# Patient Record
Sex: FEMALE | Race: WHITE | Employment: OTHER | URBAN - NONMETROPOLITAN AREA
[De-identification: names, ages, dates, MRNs, and addresses within clinical notes are randomized per-mention and may not be internally consistent; named-entity substitution may affect disease eponyms.]

---

## 2017-02-21 PROBLEM — Z95.0 S/P CARDIAC PACEMAKER PROCEDURE: Status: ACTIVE | Noted: 2017-02-21

## 2017-11-17 PROBLEM — I11.0 CONGESTIVE HEART FAILURE DUE TO HIGH BLOOD PRESSURE (HCC): Status: ACTIVE | Noted: 2017-11-17

## 2017-12-30 PROBLEM — J18.9 PNEUMONIA DUE TO ORGANISM: Status: ACTIVE | Noted: 2017-12-30

## 2017-12-30 PROBLEM — A41.9 SEPSIS (HCC): Status: ACTIVE | Noted: 2017-12-30

## 2017-12-31 PROBLEM — J96.01 ACUTE RESPIRATORY FAILURE WITH HYPOXIA (HCC): Status: ACTIVE | Noted: 2017-12-31

## 2018-01-30 PROBLEM — I50.33 ACUTE ON CHRONIC DIASTOLIC CHF (CONGESTIVE HEART FAILURE) (HCC): Status: ACTIVE | Noted: 2017-11-17

## 2018-01-30 PROBLEM — R77.8 ELEVATED TROPONIN: Status: ACTIVE | Noted: 2018-01-30

## 2018-01-30 PROBLEM — A41.9 SEPSIS (HCC): Status: RESOLVED | Noted: 2017-12-30 | Resolved: 2018-01-30

## 2018-01-30 PROBLEM — J96.01 ACUTE RESPIRATORY FAILURE WITH HYPOXIA (HCC): Status: RESOLVED | Noted: 2017-12-31 | Resolved: 2018-01-30

## 2018-01-30 PROBLEM — J18.9 PNEUMONIA DUE TO ORGANISM: Status: RESOLVED | Noted: 2017-12-30 | Resolved: 2018-01-30

## 2018-04-11 PROBLEM — R77.8 ELEVATED TROPONIN: Status: RESOLVED | Noted: 2018-01-30 | Resolved: 2018-04-11

## 2019-05-22 ENCOUNTER — OFFICE VISIT (OUTPATIENT)
Dept: PODIATRY | Age: 84
End: 2019-05-22
Payer: MEDICARE

## 2019-05-22 VITALS
TEMPERATURE: 97.4 F | SYSTOLIC BLOOD PRESSURE: 139 MMHG | DIASTOLIC BLOOD PRESSURE: 81 MMHG | BODY MASS INDEX: 25.69 KG/M2 | HEIGHT: 63 IN | WEIGHT: 145 LBS

## 2019-05-22 DIAGNOSIS — B35.1 TINEA UNGUIUM: Primary | ICD-10-CM

## 2019-05-22 DIAGNOSIS — R26.2 DIFFICULTY WALKING: ICD-10-CM

## 2019-05-22 DIAGNOSIS — L84 CORN OR CALLUS: ICD-10-CM

## 2019-05-22 DIAGNOSIS — M79.674 PAIN IN TOE OF RIGHT FOOT: ICD-10-CM

## 2019-05-22 DIAGNOSIS — M79.675 PAIN IN LEFT TOE(S): ICD-10-CM

## 2019-05-22 DIAGNOSIS — L60.0 INGROWN NAIL: ICD-10-CM

## 2019-05-22 DIAGNOSIS — I73.9 PERIPHERAL VASCULAR DISEASE, UNSPECIFIED (HCC): ICD-10-CM

## 2019-05-22 PROCEDURE — 11055 PARING/CUTG B9 HYPRKER LES 1: CPT | Performed by: PODIATRIST

## 2019-05-22 PROCEDURE — 11721 DEBRIDE NAIL 6 OR MORE: CPT | Performed by: PODIATRIST

## 2019-05-22 PROCEDURE — 99203 OFFICE O/P NEW LOW 30 MIN: CPT | Performed by: PODIATRIST

## 2019-05-22 NOTE — PROGRESS NOTES
[facility] PODIATRY  [address] [place] [address]  Dept: [phone]  Dept Fax: [phone]    NEW PATIENT PROGRESS NOTE  Date of patient's visit: 5/22/2019  Patient's Name:  Leilani Sears YOB: 1924            Patient Care Team:  Stewart Cummings MD as PCP - General (Internal Medicine)  Judie Tomas MD as Consulting Physician (Cardiology)        Chief Complaint   Patient presents with    Toe Pain     nail care saw PCP Dr. Misa Lion on 3/28/19    Foot Pain    Numbness    Nail Problem    Ingrown Toenail    Foot Swelling         HPI:   Leilani Sears is a 80 y.o. female who presents to the office today complaining of patient is seen with her  regarding a painful nails callus right foot very painful. Currently denies F/C/N/V. Pt's primary care physician is Stewart Cummings MD      Allergies   Allergen Reactions    Penicillins Hives    Sulfa Antibiotics Hives       Past Medical History:   Diagnosis Date    Acute on chronic congestive heart failure ([facility]     Acute respiratory failure with hypoxia ([facility] [facility] 12/31/2017    Arthritis     Atrial fibrillation (HCC)     Back pain 4-5 years    CAD (coronary artery disease)     Difficulty walking 4-5 years    Hypertension     Hypothyroidism     Osteoarthritis     Pneumonia due to organism 12/30/2017    Spinal stenosis     Urinary tract infection, acute        Prior to Admission medications    Medication Sig Start Date End Date Taking?  Authorizing Provider   furosemide (LASIX) 20 MG tablet Take 2 tablets by mouth 2 times daily 2/1/18  Yes Isrrael Humphreys, DO   spironolactone (ALDACTONE) 25 MG tablet Take 0.5 tablets by mouth daily 1/9/18  Yes Isrrael Humphreys DO   cloNIDine (CATAPRES) 0.1 MG tablet Take 1 tablet by mouth 3 times daily 11/21/17  Yes Isrrael Humphreys, DO   isosorbide mononitrate (IMDUR) 30 MG extended release tablet Take 1 tablet by mouth daily 11/20/17  Yes Dwight Gutierrez and metatarsals   Hammertoes: fourth toe and third toe  Hallux valgus: yes  Hallux limitus: yes  Skin Exam: skin changes and abnormal color; Neurovascular  Dorsalis pedis: absent  Posterior tibial: absent          Ortho Exam    General: AAO x 3 in NAD. Dermatologic Exam:  Skin lesion/ulceration Absent . Skin callus. Musculoskeletal:   The plantar aspect of the right foot there is a 2 x 2 painful callus plantar aspect of the 2nd and 3rd metatarsal    Vascular: DP and PT pulses palpable 0, Bilateral.  CFT <4 seconds, Bilateral.  Hair growth absent to the level of the digits, Bilateral.  Edema present, Bilateral.  Varicosities present, Bilateral. Erythema present, Bilateral    Toenail Description  Sites of Onychomycosis Involvement (Check affected area)  [x] [x] [x] [x] [x] [x] [x] [x] [x] [x]  5 4 3 2 1 1 2 3 4 5                          Right                                        Left    Thickness  [x] [x] [x] [x] [x] [x] [x] [x] [x] [x]  5 4 3 2 1 1 2 3 4 5                         Right                                        Left    Dystrophic Changes   [x] [x] [x] [x] [x] [x] [x] [x] [x] [x]  5 4 3 2 1 1 2 3 4 5                         Right                                        Left    discolored   [x] [x] [x] [x] [x] [x] [x] [x] [x] [x]  5 4 3 2 1 1 2 3 4 5                          Right                                        Left    Incurvation/Ingrowin   [] [x] [x] [x] [x] [x] [x] [x] [x] []  5 4 3 2 1 1 2 3 4 5                         Right                                        Left    Inflammation/Pain   [x] [x] [x] [x] [x] [x] [x] [x] [x] [x]  5 4 3 2 1 1 2 3 4 5                         Right                                        Left    Radiographs:      Asessment: Patient is a 80 y.o. female with:    Diagnosis Orders   1. Tinea unguium     2. Ingrown nail     3. Peripheral vascular disease, unspecified ([address]     4. Pain in toe of right foot     5. Pain in left toe(s)     6.  Difficulty walking

## 2019-06-21 ENCOUNTER — HOSPITAL ENCOUNTER (EMERGENCY)
Age: 84
Discharge: HOME | End: 2019-06-21
Attending: EMERGENCY MEDICINE
Payer: MEDICARE

## 2019-06-21 ENCOUNTER — APPOINTMENT (OUTPATIENT)
Dept: GENERAL RADIOLOGY | Age: 84
End: 2019-06-21
Payer: MEDICARE

## 2019-06-21 ENCOUNTER — APPOINTMENT (OUTPATIENT)
Dept: CT IMAGING | Age: 84
End: 2019-06-21
Payer: MEDICARE

## 2019-06-21 VITALS
WEIGHT: 150 LBS | TEMPERATURE: 97.9 F | HEART RATE: 60 BPM | HEIGHT: 63 IN | RESPIRATION RATE: 18 BRPM | DIASTOLIC BLOOD PRESSURE: 69 MMHG | OXYGEN SATURATION: 95 % | SYSTOLIC BLOOD PRESSURE: 166 MMHG | BODY MASS INDEX: 26.58 KG/M2

## 2019-06-21 DIAGNOSIS — I15.9 SECONDARY HYPERTENSION: ICD-10-CM

## 2019-06-21 DIAGNOSIS — M25.412 EFFUSION OF JOINT OF LEFT SHOULDER: ICD-10-CM

## 2019-06-21 DIAGNOSIS — M25.512 LEFT SHOULDER PAIN, UNSPECIFIED CHRONICITY: Primary | ICD-10-CM

## 2019-06-21 DIAGNOSIS — I10 HYPERTENSION, UNSPECIFIED TYPE: ICD-10-CM

## 2019-06-21 DIAGNOSIS — M19.012 OSTEOARTHRITIS OF GLENOHUMERAL JOINT, LEFT: ICD-10-CM

## 2019-06-21 LAB
ALBUMIN SERPL-MCNC: 3.8 G/DL (ref 3.5–5.2)
ALP BLD-CCNC: 79 U/L (ref 35–104)
ALT SERPL-CCNC: 13 U/L (ref 0–32)
ANION GAP SERPL CALCULATED.3IONS-SCNC: 12 MMOL/L (ref 7–16)
AST SERPL-CCNC: 20 U/L (ref 0–31)
BASOPHILS ABSOLUTE: 0.06 E9/L (ref 0–0.2)
BASOPHILS RELATIVE PERCENT: 1.1 % (ref 0–2)
BILIRUB SERPL-MCNC: 0.6 MG/DL (ref 0–1.2)
BUN BLDV-MCNC: 18 MG/DL (ref 8–23)
CALCIUM SERPL-MCNC: 9.4 MG/DL (ref 8.6–10.2)
CHLORIDE BLD-SCNC: 100 MMOL/L (ref 98–107)
CO2: 25 MMOL/L (ref 22–29)
CREAT SERPL-MCNC: 0.8 MG/DL (ref 0.5–1)
EOSINOPHILS ABSOLUTE: 0.06 E9/L (ref 0.05–0.5)
EOSINOPHILS RELATIVE PERCENT: 1.1 % (ref 0–6)
GFR AFRICAN AMERICAN: >60
GFR NON-AFRICAN AMERICAN: >60 ML/MIN/1.73
GLUCOSE BLD-MCNC: 140 MG/DL (ref 74–99)
HCT VFR BLD CALC: 42.2 % (ref 34–48)
HEMOGLOBIN: 13.5 G/DL (ref 11.5–15.5)
IMMATURE GRANULOCYTES #: 0.01 E9/L
IMMATURE GRANULOCYTES %: 0.2 % (ref 0–5)
INR BLD: 1.1
LYMPHOCYTES ABSOLUTE: 1.17 E9/L (ref 1.5–4)
LYMPHOCYTES RELATIVE PERCENT: 22.3 % (ref 20–42)
MCH RBC QN AUTO: 30.1 PG (ref 26–35)
MCHC RBC AUTO-ENTMCNC: 32 % (ref 32–34.5)
MCV RBC AUTO: 94.2 FL (ref 80–99.9)
MONOCYTES ABSOLUTE: 0.65 E9/L (ref 0.1–0.95)
MONOCYTES RELATIVE PERCENT: 12.4 % (ref 2–12)
NEUTROPHILS ABSOLUTE: 3.29 E9/L (ref 1.8–7.3)
NEUTROPHILS RELATIVE PERCENT: 62.9 % (ref 43–80)
PDW BLD-RTO: 13.2 FL (ref 11.5–15)
PLATELET # BLD: 163 E9/L (ref 130–450)
PMV BLD AUTO: 11.9 FL (ref 7–12)
POTASSIUM REFLEX MAGNESIUM: 4.6 MMOL/L (ref 3.5–5)
PRO-BNP: 2647 PG/ML (ref 0–450)
PROTHROMBIN TIME: 12.4 SEC (ref 9.3–12.4)
RBC # BLD: 4.48 E12/L (ref 3.5–5.5)
SODIUM BLD-SCNC: 137 MMOL/L (ref 132–146)
TOTAL PROTEIN: 6.5 G/DL (ref 6.4–8.3)
WBC # BLD: 5.2 E9/L (ref 4.5–11.5)

## 2019-06-21 PROCEDURE — 99284 EMERGENCY DEPT VISIT MOD MDM: CPT

## 2019-06-21 PROCEDURE — 80053 COMPREHEN METABOLIC PANEL: CPT

## 2019-06-21 PROCEDURE — 83880 ASSAY OF NATRIURETIC PEPTIDE: CPT

## 2019-06-21 PROCEDURE — 87205 SMEAR GRAM STAIN: CPT

## 2019-06-21 PROCEDURE — 71046 X-RAY EXAM CHEST 2 VIEWS: CPT

## 2019-06-21 PROCEDURE — 87070 CULTURE OTHR SPECIMN AEROBIC: CPT

## 2019-06-21 PROCEDURE — 73030 X-RAY EXAM OF SHOULDER: CPT

## 2019-06-21 PROCEDURE — 96374 THER/PROPH/DIAG INJ IV PUSH: CPT

## 2019-06-21 PROCEDURE — 73060 X-RAY EXAM OF HUMERUS: CPT

## 2019-06-21 PROCEDURE — 85025 COMPLETE CBC W/AUTO DIFF WBC: CPT

## 2019-06-21 PROCEDURE — 89060 EXAM SYNOVIAL FLUID CRYSTALS: CPT

## 2019-06-21 PROCEDURE — 6370000000 HC RX 637 (ALT 250 FOR IP): Performed by: EMERGENCY MEDICINE

## 2019-06-21 PROCEDURE — 85610 PROTHROMBIN TIME: CPT

## 2019-06-21 PROCEDURE — 6360000002 HC RX W HCPCS: Performed by: EMERGENCY MEDICINE

## 2019-06-21 PROCEDURE — 2500000003 HC RX 250 WO HCPCS: Performed by: STUDENT IN AN ORGANIZED HEALTH CARE EDUCATION/TRAINING PROGRAM

## 2019-06-21 PROCEDURE — 93005 ELECTROCARDIOGRAM TRACING: CPT | Performed by: STUDENT IN AN ORGANIZED HEALTH CARE EDUCATION/TRAINING PROGRAM

## 2019-06-21 PROCEDURE — 73200 CT UPPER EXTREMITY W/O DYE: CPT

## 2019-06-21 PROCEDURE — 6370000000 HC RX 637 (ALT 250 FOR IP): Performed by: STUDENT IN AN ORGANIZED HEALTH CARE EDUCATION/TRAINING PROGRAM

## 2019-06-21 PROCEDURE — 20610 DRAIN/INJ JOINT/BURSA W/O US: CPT

## 2019-06-21 RX ORDER — ACETAMINOPHEN 325 MG/1
650 TABLET ORAL ONCE
Status: COMPLETED | OUTPATIENT
Start: 2019-06-21 | End: 2019-06-21

## 2019-06-21 RX ORDER — 0.9 % SODIUM CHLORIDE 0.9 %
500 INTRAVENOUS SOLUTION INTRAVENOUS ONCE
Status: DISCONTINUED | OUTPATIENT
Start: 2019-06-21 | End: 2019-06-21

## 2019-06-21 RX ORDER — CLONIDINE HYDROCHLORIDE 0.1 MG/1
TABLET ORAL
Status: DISCONTINUED
Start: 2019-06-21 | End: 2019-06-21 | Stop reason: HOSPADM

## 2019-06-21 RX ORDER — LIDOCAINE HYDROCHLORIDE AND EPINEPHRINE 10; 10 MG/ML; UG/ML
20 INJECTION, SOLUTION INFILTRATION; PERINEURAL ONCE
Status: COMPLETED | OUTPATIENT
Start: 2019-06-21 | End: 2019-06-21

## 2019-06-21 RX ORDER — CLONIDINE HYDROCHLORIDE 0.1 MG/1
0.1 TABLET ORAL ONCE
Status: COMPLETED | OUTPATIENT
Start: 2019-06-21 | End: 2019-06-21

## 2019-06-21 RX ORDER — MORPHINE SULFATE 2 MG/ML
2 INJECTION, SOLUTION INTRAMUSCULAR; INTRAVENOUS ONCE
Status: COMPLETED | OUTPATIENT
Start: 2019-06-21 | End: 2019-06-21

## 2019-06-21 RX ADMIN — CLONIDINE HYDROCHLORIDE 0.1 MG: 0.1 TABLET ORAL at 17:32

## 2019-06-21 RX ADMIN — MORPHINE SULFATE 2 MG: 2 INJECTION, SOLUTION INTRAMUSCULAR; INTRAVENOUS at 16:13

## 2019-06-21 RX ADMIN — ACETAMINOPHEN 650 MG: 325 TABLET ORAL at 12:14

## 2019-06-21 RX ADMIN — LIDOCAINE HYDROCHLORIDE,EPINEPHRINE BITARTRATE 20 ML: 10; .01 INJECTION, SOLUTION INFILTRATION; PERINEURAL at 17:34

## 2019-06-21 ASSESSMENT — PAIN DESCRIPTION - PAIN TYPE
TYPE: ACUTE PAIN
TYPE: ACUTE PAIN

## 2019-06-21 ASSESSMENT — PAIN DESCRIPTION - DESCRIPTORS: DESCRIPTORS: ACHING

## 2019-06-21 ASSESSMENT — ENCOUNTER SYMPTOMS
EYE PAIN: 0
VOMITING: 0
EYE REDNESS: 0
SINUS PAIN: 0
ABDOMINAL PAIN: 0
DIARRHEA: 0
COUGH: 0
CONSTIPATION: 0
NAUSEA: 0
SORE THROAT: 0
SHORTNESS OF BREATH: 0

## 2019-06-21 ASSESSMENT — PAIN - FUNCTIONAL ASSESSMENT: PAIN_FUNCTIONAL_ASSESSMENT: PREVENTS OR INTERFERES SOME ACTIVE ACTIVITIES AND ADLS

## 2019-06-21 ASSESSMENT — PAIN DESCRIPTION - FREQUENCY: FREQUENCY: CONTINUOUS

## 2019-06-21 ASSESSMENT — PAIN SCALES - GENERAL
PAINLEVEL_OUTOF10: 0
PAINLEVEL_OUTOF10: 7
PAINLEVEL_OUTOF10: 6
PAINLEVEL_OUTOF10: 5

## 2019-06-21 ASSESSMENT — PAIN DESCRIPTION - ORIENTATION
ORIENTATION: LEFT
ORIENTATION: LEFT

## 2019-06-21 ASSESSMENT — PAIN DESCRIPTION - LOCATION
LOCATION: ARM
LOCATION: SHOULDER

## 2019-06-21 ASSESSMENT — PAIN DESCRIPTION - ONSET: ONSET: SUDDEN

## 2019-06-21 NOTE — ED NOTES
Bed:  [place] [address]  Expected date:   Expected time:   Means of arrival:   Comments:  Allison Knowles RN  06/21/19 1125

## 2019-06-21 NOTE — ED PROVIDER NOTES
The patient is a 63-year-old female presenting with arm pain. Patient a history of osteoarthritis and rheumatoid arthritis. Patient states for the past month her pain has increased in her left shoulder. The pain starts in the time of her left shoulder and progresses down her bicep to her elbow. Patient states that they have tried CBD oil further pain with minor improvement. The pain is located the superior and lateral aspect of the shoulder. Pain is made worse when she moves her arm. Pain is made better with CBD oil application and rest.  Patient denied any previous injuries to the shoulder. Patient is present at bedside with her  who states that the patient over the past few days has been waking up at night in excruciating pain in her left shoulder. She admits to a history of gout. Patient has pain in the left shoulder and decreased range of motion. Neurovascularly intact no discoloration or swelling noted. Left shoulder is tender to palpation. He denies fever, chills, shortness of breath or nausea, vomiting, chest pain, numbness, tingling. Patient is on Coumadin for atrial fibrillation. Review of Systems   Constitutional: Negative for chills and fever. HENT: Negative for congestion, sinus pain and sore throat. Eyes: Negative for pain and redness. Respiratory: Negative for cough and shortness of breath. Cardiovascular: Negative for chest pain and palpitations. Gastrointestinal: Negative for abdominal pain, constipation, diarrhea, nausea and vomiting. Endocrine: Negative for polyuria. Genitourinary: Negative for difficulty urinating, dysuria, frequency and hematuria. Musculoskeletal: Positive for arthralgias (Left shoulder), joint swelling (Left shoulder) and myalgias (Left bicep). Negative for neck pain. Skin: Negative. Neurological: Negative for dizziness, weakness, light-headedness and headaches. Hematological: Negative.     Psychiatric/Behavioral: Negative for agitation and confusion. Physical Exam   Constitutional: She is oriented to person, place, and time. She appears well-developed and well-nourished. HENT:   Head: Normocephalic and atraumatic. Eyes: Pupils are equal, round, and reactive to light. Conjunctivae and EOM are normal.   Neck: Normal range of motion. No JVD present. Cardiovascular: Normal rate, regular rhythm, normal heart sounds and intact distal pulses. Pulmonary/Chest: Effort normal and breath sounds normal. No respiratory distress. She has no wheezes. Abdominal: Soft. Bowel sounds are normal. She exhibits no distension and no mass. There is no tenderness. There is no guarding. Musculoskeletal: She exhibits no edema. Neurological: She is oriented to person, place, and time. Skin: Skin is warm and dry. Psychiatric: She has a normal mood and affect. Nursing note and vitals reviewed. Procedures  Arthrocentesis Procedure Note    Indication: joint swelling    Consent: The patient was counseled regarding the procedure, it's indications, risks, potential complications and alternatives and any questions were answered. Consent was obtained. Procedure: The left shoulder was positioned appropriately and the landmarks were identified. Local anesthesia was obtained by infiltration using 1% Lidocaine without epinephrine. The area was then prepped and draped in the usual sterile fashion. A needle was then introduced into the joint space at which point 2 cc of yellow and clear. A joint injection was not performed. The aspirated fluid was sent to the lab for appropriate testing. A sterile dressing was then applied to the site. The patient tolerated the procedure well. Complications: None      MDM    ED Course as of Jun 21 2005 Fri Jun 21, 2019   1447 Very large fluid collection in the subacromial subdeltoid  bursa which is likely a secondary manifestation of a rotator cuff  tear. Joint effusion.  Severe osteoarthritis at the glenohumeral joint  and somewhat milder at the [facility] joint with an [facility] joint effusion as well. [WL]   1770 She reevaluated at bedside, she has some swelling of her left shoulder that is tender to palpation.    [WL]   1600 Chest x-ray shows mild CHF without edema    [WL]   1601 X-ray of the left shoulder demonstrates moderate to severe osteoarthritis of the glenohumeral joint.    [WL]   7522 Patient's blood pressure was 230/93 but is in acute pain. We will give her morphine and then reevaluate her blood pressure.    [WL]   1727 Patient BP elevated. Clonidine ordered (home med). [CS]      ED Course User Index  [CS] Angelito Ayala DO  [WL] Russ Vo DO   4:00 PM  I have signed this patient out to the oncoming physician, Dr. Stefany Mae. I have discussed the patient's initial exam, treatment and plan of care with the on coming physician. I have notified the patient / family of the change in treating physician and answered their questions to this point. Arthrocentesis Procedure Note    Indication: Joint pain and joint swelling    Consent: The patient was counseled regarding the procedure, it's indications, risks, potential complications and alternatives and any questions were answered. Consent was obtained. Procedure: The left shoulder was positioned appropriately and the landmarks were identified. Local anesthesia was obtained by infiltration using 1% Lidocaine without epinephrine. The area was then prepped and draped in the usual sterile fashion. A needle was then introduced into the joint space at which point 20 cc of straw colored fluid was aspirated. A joint injection was not performed. The aspirated fluid was sent to the lab for appropriate testing. A sterile dressing was then applied to the site. The patient tolerated the procedure well.     Complications: None      Patient presents to the ED for   Chief Complaint   Patient presents with    Arm Pain     left x3 weeks, pt states she injured arm over 5 years ago   . Patient continues to be non-toxic on re-evaluation. Findings were discussed with the patient and reasons to immediately return to the ED were articulated to them. They will follow-up with their PMD.    ED Course as of Jun 21 2005 Fri Jun 21, 2019   1447 Very large fluid collection in the subacromial subdeltoid  bursa which is likely a secondary manifestation of a rotator cuff  tear. Joint effusion. Severe osteoarthritis at the glenohumeral joint  and somewhat milder at the [facility] joint with an [facility] joint effusion as well. [WL]   7890 She reevaluated at bedside, she has some swelling of her left shoulder that is tender to palpation.    [WL]   1600 Chest x-ray shows mild CHF without edema    [WL]   1601 X-ray of the left shoulder demonstrates moderate to severe osteoarthritis of the glenohumeral joint.    [WL]   2594 Patient's blood pressure was 230/93 but is in acute pain. We will give her morphine and then reevaluate her blood pressure.    [WL]   1727 Patient BP elevated. Clonidine ordered (home med). [CS]      ED Course User Index  [CS] Marc Shone, DO  [WL] Kathy Brewster DO       --------------------------------------------- PAST HISTORY ---------------------------------------------  Past Medical History:  has a past medical history of Acute on chronic congestive heart failure ([address] Acute respiratory failure with hypoxia ([address] Arthritis, Atrial fibrillation ([address] Back pain, CAD (coronary artery disease), Difficulty walking, Hypertension, Hypothyroidism, Osteoarthritis, Pneumonia due to organism, Spinal stenosis, and Urinary tract infection, acute. Past Surgical History:  has a past surgical history that includes hernia repair (2008); Hysterectomy (1968); joint replacement (1992); joint replacement (2000); Cataract removal (2007); Femur Surgery (5/5/12); Echo Complete (5/7/2012); fracture surgery; Tonsillectomy;  Appendectomy; Colonoscopy; eye surgery; pacemaker placement (2009); and pacemaker placement (02/21/2017). Social History:  reports that she has never smoked. She has never used smokeless tobacco. She reports that she does not drink alcohol or use drugs. Family History: family history includes Cancer in her brother, daughter, and sister; Stroke in her father. The patients home medications have been reviewed.     Allergies: Penicillins and Sulfa antibiotics    -------------------------------------------------- RESULTS -------------------------------------------------  Labs:  Results for orders placed or performed during the hospital encounter of 06/21/19   CBC auto differential   Result Value Ref Range    WBC 5.2 4.5 - 11.5 E9/L    RBC 4.48 3.50 - 5.50 E12/L    Hemoglobin 13.5 11.5 - 15.5 g/dL    Hematocrit 42.2 34.0 - 48.0 %    MCV 94.2 80.0 - 99.9 fL    MCH 30.1 26.0 - 35.0 pg    MCHC 32.0 32.0 - 34.5 %    RDW 13.2 11.5 - 15.0 fL    Platelets 297 488 - 936 E9/L    MPV 11.9 7.0 - 12.0 fL    Neutrophils % 62.9 43.0 - 80.0 %    Immature Granulocytes % 0.2 0.0 - 5.0 %    Lymphocytes % 22.3 20.0 - 42.0 %    Monocytes % 12.4 (H) 2.0 - 12.0 %    Eosinophils % 1.1 0.0 - 6.0 %    Basophils % 1.1 0.0 - 2.0 %    Neutrophils # 3.29 1.80 - 7.30 E9/L    Immature Granulocytes # 0.01 E9/L    Lymphocytes # 1.17 (L) 1.50 - 4.00 E9/L    Monocytes # 0.65 0.10 - 0.95 E9/L    Eosinophils # 0.06 0.05 - 0.50 E9/L    Basophils # 0.06 0.00 - 0.20 E9/L   Comprehensive Metabolic Panel w/ Reflex to MG   Result Value Ref Range    Sodium 137 132 - 146 mmol/L    Potassium reflex Magnesium 4.6 3.5 - 5.0 mmol/L    Chloride 100 98 - 107 mmol/L    CO2 25 22 - 29 mmol/L    Anion Gap 12 7 - 16 mmol/L    Glucose 140 (H) 74 - 99 mg/dL    BUN 18 8 - 23 mg/dL    CREATININE 0.8 0.5 - 1.0 mg/dL    GFR Non-African American >60 >=60 mL/min/1.73    GFR African American >60     Calcium 9.4 8.6 - 10.2 mg/dL    Total Protein 6.5 6.4 - 8.3 g/dL    Alb 3.8 3.5 - 5.2 g/dL    Total Bilirubin 0.6 0.0 - 1.2 mg/dL    Alkaline Phosphatase 79 35 - 104 U/L    ALT 13 0 - 32 U/L    AST 20 0 - 31 U/L   Brain Natriuretic Peptide   Result Value Ref Range    Pro-BNP 2,647 (H) 0 - 450 pg/mL   Protime-INR   Result Value Ref Range    Protime 12.4 9.3 - 12.4 sec    INR 1.1    EKG 12 Lead   Result Value Ref Range    Ventricular Rate 61 BPM    Atrial Rate 357 BPM    QRS Duration 156 ms    Q-T Interval 476 ms    QTc Calculation (Bazett) 479 ms    R Axis -75 degrees    T Axis 93 degrees       Radiology:  CT SHOULDER LEFT WO CONTRAST   Final Result   Very large fluid collection in the subacromial subdeltoid   bursa which is likely a secondary manifestation of a rotator cuff   tear. Joint effusion. Severe osteoarthritis at the glenohumeral joint   and somewhat milder at the [facility] joint with an [facility] joint effusion as well. XR SHOULDER LEFT (MIN 2 VIEWS)   Final Result   Moderate to severe osteoarthritis of the left glenohumeral joint. XR CHEST STANDARD (2 VW)   Final Result   Mild CHF without edema. XR HUMERUS LEFT (MIN 2 VIEWS)   Final Result   Moderate to severe osteoarthritis of the left glenohumeral joint.          ------------------------- NURSING NOTES AND VITALS REVIEWED ---------------------------  Date / Time Roomed:  6/21/2019 11:26 AM  ED Bed Assignment:  26/26    The nursing notes within the ED encounter and vital signs as below have been reviewed. BP (!) 166/69   Pulse 60   Temp 97.9 °F (36.6 °C) (Oral)   Resp 18   Ht 5' 3\" (1.6 m)   Wt 150 lb (68 kg)   SpO2 95%   BMI 26.57 kg/m²           --------------------------------- ADDITIONAL PROVIDER NOTES ---------------------------------  At this time the patient is without objective evidence of an acute process requiring hospitalization or inpatient management. They have remained hemodynamically stable throughout their entire ED visit and are stable for discharge with outpatient follow-up.      The plan has been discussed in detail and they are aware of the specific conditions for emergent return, as well as the importance of follow-up. Discharge Medication List as of 6/21/2019  6:26 PM          Diagnosis:  1. Left shoulder pain, unspecified chronicity    2. Effusion of joint of left shoulder    3. Osteoarthritis of glenohumeral joint, left    4. Secondary hypertension    5. Hypertension, unspecified type        Disposition:  Patient's disposition: Discharge  Patient's condition is stable.          Dm Salcedo DO  Resident  06/21/19 2005

## 2019-06-21 NOTE — ED NOTES
Bed: 26  Expected date:   Expected time:   Means of arrival:   Comments:     Rubén Wells RN  06/21/19 7175

## 2019-06-21 NOTE — ED NOTES
Per Nereyda RN patient okay to discharge home and dr aware of vitals     Omega Galvez, RN  06/21/19 3084

## 2019-06-22 LAB
EKG ATRIAL RATE: 357 BPM
EKG Q-T INTERVAL: 476 MS
EKG QRS DURATION: 156 MS
EKG QTC CALCULATION (BAZETT): 479 MS
EKG R AXIS: -75 DEGREES
EKG T AXIS: 93 DEGREES
EKG VENTRICULAR RATE: 61 BPM
GRAM STAIN ORDERABLE: NORMAL

## 2019-06-22 PROCEDURE — 93010 ELECTROCARDIOGRAM REPORT: CPT | Performed by: INTERNAL MEDICINE

## 2019-06-24 LAB — CRYSTALS, FLUID: NORMAL

## 2019-06-27 LAB
BODY FLUID CULTURE, STERILE: NORMAL
GRAM STAIN RESULT: NORMAL

## 2019-07-29 ENCOUNTER — PROCEDURE VISIT (OUTPATIENT)
Dept: PODIATRY | Age: 84
End: 2019-07-29
Payer: MEDICARE

## 2019-07-29 VITALS
TEMPERATURE: 97.8 F | DIASTOLIC BLOOD PRESSURE: 82 MMHG | SYSTOLIC BLOOD PRESSURE: 131 MMHG | BODY MASS INDEX: 27.51 KG/M2 | WEIGHT: 148 LBS

## 2019-07-29 DIAGNOSIS — M79.675 PAIN IN LEFT TOE(S): ICD-10-CM

## 2019-07-29 DIAGNOSIS — B35.1 TINEA UNGUIUM: ICD-10-CM

## 2019-07-29 DIAGNOSIS — M79.674 PAIN IN TOE OF RIGHT FOOT: ICD-10-CM

## 2019-07-29 DIAGNOSIS — L89.890 PRESSURE INJURY OF LEFT FOOT, UNSTAGEABLE (HCC): Primary | ICD-10-CM

## 2019-07-29 DIAGNOSIS — R26.2 DIFFICULTY WALKING: ICD-10-CM

## 2019-07-29 DIAGNOSIS — I73.9 PERIPHERAL VASCULAR DISEASE, UNSPECIFIED (HCC): ICD-10-CM

## 2019-07-29 PROCEDURE — 11721 DEBRIDE NAIL 6 OR MORE: CPT | Performed by: PODIATRIST

## 2019-07-29 PROCEDURE — 99212 OFFICE O/P EST SF 10 MIN: CPT | Performed by: PODIATRIST

## 2019-07-29 NOTE — PROGRESS NOTES
both shoe gear. Palpation nails greater then 3 mm thick painful       Dermatologic Exam:hair loss noted  lower extremity    Skin lesion/ulceration   Skin   Callus   Musculoskeletal:     1st MPJ ROM normal, Bilateral.  Muscle strength 5/5, Bilateral.  Pain present upon palpation of toenails 1-5  Bilateral., Bilateral.  Ankle ROM normal,Bilateral.    Dorsally contracted digits 2-5, Bilateral.  The ulcer that is located on the third toe left foot again is nondraining mildly painful no erythematous no exposed bone or tendon    Vascular:  Pulses   bilateral DP absent    PT absent    Neurological:  Sensation present to light touch to level of digits, Bilateral.    Foot Exam    General  General Appearance: appears stated age and healthy   Orientation: alert and oriented to person, place, and time   Assistance: cane use       Left Foot/Ankle      Inspection and Palpation  Hammertoes: second toe and third toe  Skin Exam: skin changes and ulcer (ulcer noted   3rd toe non measureable   c); Ortho Exam  Q7   []Yes    []No                Q8   [x]Yes    []No                     Q9   []Yes    []No  Assessment:  80 y.o. female with:   1. Pain in left toe(s)    2. Pressure injury of left foot, unstageable ([address]    3. Peripheral vascular disease, unspecified ([address]    4. Pain in toe of right foot    5. Tinea unguium    6. Difficulty walking       Orders Placed This Encounter   Procedures    Diabetic Shoe     Pt is not a dm needs orthopedic shoes    [employer] M[employer] SHOES DPTH I         Plan:   Pt was evaluated and examined. Patient was given personalized discharge instructions. Nails 1-10 were debrided in length and thickness sharply with a nail nipper and  without incident. Pt will follow up in 9 weeks or sooner if any problems arise. Diagnosis was discussed with the pt and all of their questions were answered in detail. Proper foot hygiene and care was discussed with the pt.  Patient to check feet daily and contact the office with any questions/problems/concerns. Other comorbidity noted and will be managed by PCP. Pain waiver discussed with patient and confirmed.    Today I examined the ulcer the patient is to apply bacitracin ointment with a gel tube daily if this becomes any worse the patient is to call me in the next week  Custom shoes for michelle      Electronically signed by Susy Swanson DPM on 7/29/2019 at 11:00 AM  7/29/2019

## 2019-10-28 ENCOUNTER — PROCEDURE VISIT (OUTPATIENT)
Dept: PODIATRY | Age: 84
End: 2019-10-28
Payer: MEDICARE

## 2019-10-28 VITALS
WEIGHT: 151 LBS | TEMPERATURE: 97.3 F | DIASTOLIC BLOOD PRESSURE: 82 MMHG | HEIGHT: 61 IN | BODY MASS INDEX: 28.51 KG/M2 | SYSTOLIC BLOOD PRESSURE: 132 MMHG

## 2019-10-28 DIAGNOSIS — L84 CORN OR CALLUS: Primary | ICD-10-CM

## 2019-10-28 DIAGNOSIS — M79.675 PAIN IN LEFT TOE(S): ICD-10-CM

## 2019-10-28 DIAGNOSIS — M79.674 PAIN IN TOE OF RIGHT FOOT: ICD-10-CM

## 2019-10-28 DIAGNOSIS — I73.9 PERIPHERAL VASCULAR DISEASE, UNSPECIFIED (HCC): ICD-10-CM

## 2019-10-28 DIAGNOSIS — R26.2 DIFFICULTY WALKING: ICD-10-CM

## 2019-10-28 DIAGNOSIS — B35.1 TINEA UNGUIUM: ICD-10-CM

## 2019-10-28 PROCEDURE — 11055 PARING/CUTG B9 HYPRKER LES 1: CPT | Performed by: PODIATRIST

## 2019-10-28 PROCEDURE — 11721 DEBRIDE NAIL 6 OR MORE: CPT | Performed by: PODIATRIST

## 2019-11-18 ENCOUNTER — ANESTHESIA (OUTPATIENT)
Dept: ENDOSCOPY | Age: 84
DRG: 378 | End: 2019-11-18
Payer: MEDICARE

## 2019-11-18 ENCOUNTER — ANESTHESIA EVENT (OUTPATIENT)
Dept: ENDOSCOPY | Age: 84
DRG: 378 | End: 2019-11-18
Payer: MEDICARE

## 2019-11-18 ENCOUNTER — HOSPITAL ENCOUNTER (INPATIENT)
Age: 84
LOS: 1 days | Discharge: HOME HEALTH | DRG: 378 | End: 2019-11-19
Attending: EMERGENCY MEDICINE | Admitting: INTERNAL MEDICINE
Payer: MEDICARE

## 2019-11-18 VITALS — DIASTOLIC BLOOD PRESSURE: 76 MMHG | SYSTOLIC BLOOD PRESSURE: 112 MMHG | OXYGEN SATURATION: 95 %

## 2019-11-18 DIAGNOSIS — K92.2 GASTROINTESTINAL HEMORRHAGE, UNSPECIFIED GASTROINTESTINAL HEMORRHAGE TYPE: Primary | ICD-10-CM

## 2019-11-18 PROBLEM — D62 ACUTE BLOOD LOSS ANEMIA: Status: ACTIVE | Noted: 2019-11-18

## 2019-11-18 PROBLEM — I50.32 CHRONIC DIASTOLIC CHF (CONGESTIVE HEART FAILURE) (HCC): Status: ACTIVE | Noted: 2017-11-17

## 2019-11-18 LAB
ABO/RH: NORMAL
ALBUMIN SERPL-MCNC: 3.2 G/DL (ref 3.5–5.2)
ALBUMIN SERPL-MCNC: 3.4 G/DL (ref 3.5–5.2)
ALP BLD-CCNC: 67 U/L (ref 35–104)
ALP BLD-CCNC: 68 U/L (ref 35–104)
ALT SERPL-CCNC: 11 U/L (ref 0–32)
ALT SERPL-CCNC: 15 U/L (ref 0–32)
ANION GAP SERPL CALCULATED.3IONS-SCNC: 9 MMOL/L (ref 7–16)
ANION GAP SERPL CALCULATED.3IONS-SCNC: 9 MMOL/L (ref 7–16)
ANTIBODY SCREEN: NORMAL
APTT: <20 SEC (ref 24.5–35.1)
AST SERPL-CCNC: 13 U/L (ref 0–31)
AST SERPL-CCNC: 26 U/L (ref 0–31)
BASOPHILS ABSOLUTE: 0.02 E9/L (ref 0–0.2)
BASOPHILS ABSOLUTE: 0.03 E9/L (ref 0–0.2)
BASOPHILS RELATIVE PERCENT: 0.3 % (ref 0–2)
BASOPHILS RELATIVE PERCENT: 0.4 % (ref 0–2)
BILIRUB SERPL-MCNC: 0.4 MG/DL (ref 0–1.2)
BILIRUB SERPL-MCNC: <0.2 MG/DL (ref 0–1.2)
BUN BLDV-MCNC: 51 MG/DL (ref 8–23)
BUN BLDV-MCNC: 60 MG/DL (ref 8–23)
CALCIUM SERPL-MCNC: 9 MG/DL (ref 8.6–10.2)
CALCIUM SERPL-MCNC: 9 MG/DL (ref 8.6–10.2)
CHLORIDE BLD-SCNC: 103 MMOL/L (ref 98–107)
CHLORIDE BLD-SCNC: 104 MMOL/L (ref 98–107)
CO2: 26 MMOL/L (ref 22–29)
CO2: 26 MMOL/L (ref 22–29)
CREAT SERPL-MCNC: 0.7 MG/DL (ref 0.5–1)
CREAT SERPL-MCNC: 0.8 MG/DL (ref 0.5–1)
EOSINOPHILS ABSOLUTE: 0.03 E9/L (ref 0.05–0.5)
EOSINOPHILS ABSOLUTE: 0.06 E9/L (ref 0.05–0.5)
EOSINOPHILS RELATIVE PERCENT: 0.4 % (ref 0–6)
EOSINOPHILS RELATIVE PERCENT: 0.7 % (ref 0–6)
GFR AFRICAN AMERICAN: >60
GFR AFRICAN AMERICAN: >60
GFR NON-AFRICAN AMERICAN: >60 ML/MIN/1.73
GFR NON-AFRICAN AMERICAN: >60 ML/MIN/1.73
GLUCOSE BLD-MCNC: 118 MG/DL (ref 74–99)
GLUCOSE BLD-MCNC: 170 MG/DL (ref 74–99)
HCT VFR BLD CALC: 25.2 % (ref 34–48)
HCT VFR BLD CALC: 30.3 % (ref 34–48)
HEMOGLOBIN: 8.1 G/DL (ref 11.5–15.5)
HEMOGLOBIN: 9.6 G/DL (ref 11.5–15.5)
IMMATURE GRANULOCYTES #: 0.02 E9/L
IMMATURE GRANULOCYTES #: 0.03 E9/L
IMMATURE GRANULOCYTES %: 0.3 % (ref 0–5)
IMMATURE GRANULOCYTES %: 0.4 % (ref 0–5)
INR BLD: 1.3
LACTIC ACID, SEPSIS: 2.2 MMOL/L (ref 0.5–1.9)
LYMPHOCYTES ABSOLUTE: 1.01 E9/L (ref 1.5–4)
LYMPHOCYTES ABSOLUTE: 1.44 E9/L (ref 1.5–4)
LYMPHOCYTES RELATIVE PERCENT: 12.5 % (ref 20–42)
LYMPHOCYTES RELATIVE PERCENT: 19.1 % (ref 20–42)
MCH RBC QN AUTO: 31.2 PG (ref 26–35)
MCH RBC QN AUTO: 31.5 PG (ref 26–35)
MCHC RBC AUTO-ENTMCNC: 31.7 % (ref 32–34.5)
MCHC RBC AUTO-ENTMCNC: 32.1 % (ref 32–34.5)
MCV RBC AUTO: 98.1 FL (ref 80–99.9)
MCV RBC AUTO: 98.4 FL (ref 80–99.9)
MONOCYTES ABSOLUTE: 0.43 E9/L (ref 0.1–0.95)
MONOCYTES ABSOLUTE: 0.95 E9/L (ref 0.1–0.95)
MONOCYTES RELATIVE PERCENT: 12.6 % (ref 2–12)
MONOCYTES RELATIVE PERCENT: 5.3 % (ref 2–12)
NEUTROPHILS ABSOLUTE: 5.06 E9/L (ref 1.8–7.3)
NEUTROPHILS ABSOLUTE: 6.52 E9/L (ref 1.8–7.3)
NEUTROPHILS RELATIVE PERCENT: 67.3 % (ref 43–80)
NEUTROPHILS RELATIVE PERCENT: 80.7 % (ref 43–80)
PDW BLD-RTO: 12.8 FL (ref 11.5–15)
PDW BLD-RTO: 12.8 FL (ref 11.5–15)
PLATELET # BLD: 120 E9/L (ref 130–450)
PLATELET # BLD: 149 E9/L (ref 130–450)
PMV BLD AUTO: 12.7 FL (ref 7–12)
PMV BLD AUTO: 13 FL (ref 7–12)
POTASSIUM REFLEX MAGNESIUM: 4.6 MMOL/L (ref 3.5–5)
POTASSIUM SERPL-SCNC: 5.3 MMOL/L (ref 3.5–5)
PROTHROMBIN TIME: 14.5 SEC (ref 9.3–12.4)
RBC # BLD: 2.57 E12/L (ref 3.5–5.5)
RBC # BLD: 3.08 E12/L (ref 3.5–5.5)
SODIUM BLD-SCNC: 138 MMOL/L (ref 132–146)
SODIUM BLD-SCNC: 139 MMOL/L (ref 132–146)
TOTAL PROTEIN: 4.9 G/DL (ref 6.4–8.3)
TOTAL PROTEIN: 5.5 G/DL (ref 6.4–8.3)
WBC # BLD: 7.5 E9/L (ref 4.5–11.5)
WBC # BLD: 8.1 E9/L (ref 4.5–11.5)

## 2019-11-18 PROCEDURE — 88305 TISSUE EXAM BY PATHOLOGIST: CPT

## 2019-11-18 PROCEDURE — 94761 N-INVAS EAR/PLS OXIMETRY MLT: CPT

## 2019-11-18 PROCEDURE — 85610 PROTHROMBIN TIME: CPT

## 2019-11-18 PROCEDURE — 3609012400 HC EGD TRANSORAL BIOPSY SINGLE/MULTIPLE: Performed by: SURGERY

## 2019-11-18 PROCEDURE — 6360000002 HC RX W HCPCS: Performed by: NURSE ANESTHETIST, CERTIFIED REGISTERED

## 2019-11-18 PROCEDURE — 6370000000 HC RX 637 (ALT 250 FOR IP): Performed by: INTERNAL MEDICINE

## 2019-11-18 PROCEDURE — 97165 OT EVAL LOW COMPLEX 30 MIN: CPT

## 2019-11-18 PROCEDURE — 6370000000 HC RX 637 (ALT 250 FOR IP): Performed by: SURGERY

## 2019-11-18 PROCEDURE — 2580000003 HC RX 258: Performed by: EMERGENCY MEDICINE

## 2019-11-18 PROCEDURE — 88342 IMHCHEM/IMCYTCHM 1ST ANTB: CPT

## 2019-11-18 PROCEDURE — 3700000000 HC ANESTHESIA ATTENDED CARE: Performed by: SURGERY

## 2019-11-18 PROCEDURE — 6360000002 HC RX W HCPCS: Performed by: EMERGENCY MEDICINE

## 2019-11-18 PROCEDURE — 86900 BLOOD TYPING SEROLOGIC ABO: CPT

## 2019-11-18 PROCEDURE — 6360000002 HC RX W HCPCS: Performed by: STUDENT IN AN ORGANIZED HEALTH CARE EDUCATION/TRAINING PROGRAM

## 2019-11-18 PROCEDURE — C9113 INJ PANTOPRAZOLE SODIUM, VIA: HCPCS | Performed by: EMERGENCY MEDICINE

## 2019-11-18 PROCEDURE — 7100000010 HC PHASE II RECOVERY - FIRST 15 MIN: Performed by: SURGERY

## 2019-11-18 PROCEDURE — 80053 COMPREHEN METABOLIC PANEL: CPT

## 2019-11-18 PROCEDURE — 97161 PT EVAL LOW COMPLEX 20 MIN: CPT

## 2019-11-18 PROCEDURE — 7100000011 HC PHASE II RECOVERY - ADDTL 15 MIN: Performed by: SURGERY

## 2019-11-18 PROCEDURE — 86850 RBC ANTIBODY SCREEN: CPT

## 2019-11-18 PROCEDURE — 86901 BLOOD TYPING SEROLOGIC RH(D): CPT

## 2019-11-18 PROCEDURE — 83605 ASSAY OF LACTIC ACID: CPT

## 2019-11-18 PROCEDURE — 85730 THROMBOPLASTIN TIME PARTIAL: CPT

## 2019-11-18 PROCEDURE — 2580000003 HC RX 258: Performed by: INTERNAL MEDICINE

## 2019-11-18 PROCEDURE — 36415 COLL VENOUS BLD VENIPUNCTURE: CPT

## 2019-11-18 PROCEDURE — 0DB78ZX EXCISION OF STOMACH, PYLORUS, VIA NATURAL OR ARTIFICIAL OPENING ENDOSCOPIC, DIAGNOSTIC: ICD-10-PCS | Performed by: SURGERY

## 2019-11-18 PROCEDURE — 85025 COMPLETE CBC W/AUTO DIFF WBC: CPT

## 2019-11-18 PROCEDURE — 2709999900 HC NON-CHARGEABLE SUPPLY: Performed by: SURGERY

## 2019-11-18 PROCEDURE — 2060000000 HC ICU INTERMEDIATE R&B

## 2019-11-18 PROCEDURE — 99285 EMERGENCY DEPT VISIT HI MDM: CPT

## 2019-11-18 PROCEDURE — 2580000003 HC RX 258: Performed by: STUDENT IN AN ORGANIZED HEALTH CARE EDUCATION/TRAINING PROGRAM

## 2019-11-18 PROCEDURE — C9113 INJ PANTOPRAZOLE SODIUM, VIA: HCPCS | Performed by: STUDENT IN AN ORGANIZED HEALTH CARE EDUCATION/TRAINING PROGRAM

## 2019-11-18 PROCEDURE — 3700000001 HC ADD 15 MINUTES (ANESTHESIA): Performed by: SURGERY

## 2019-11-18 PROCEDURE — 96374 THER/PROPH/DIAG INJ IV PUSH: CPT

## 2019-11-18 RX ORDER — SODIUM CHLORIDE 9 MG/ML
10 INJECTION INTRAVENOUS 2 TIMES DAILY
Status: DISCONTINUED | OUTPATIENT
Start: 2019-11-18 | End: 2019-11-19 | Stop reason: HOSPADM

## 2019-11-18 RX ORDER — ONDANSETRON 2 MG/ML
4 INJECTION INTRAMUSCULAR; INTRAVENOUS EVERY 6 HOURS PRN
Status: DISCONTINUED | OUTPATIENT
Start: 2019-11-18 | End: 2019-11-19 | Stop reason: HOSPADM

## 2019-11-18 RX ORDER — SUCRALFATE 1 G/1
1 TABLET ORAL EVERY 6 HOURS SCHEDULED
Status: DISCONTINUED | OUTPATIENT
Start: 2019-11-18 | End: 2019-11-19 | Stop reason: HOSPADM

## 2019-11-18 RX ORDER — PANTOPRAZOLE SODIUM 40 MG/1
40 TABLET, DELAYED RELEASE ORAL
Status: DISCONTINUED | OUTPATIENT
Start: 2019-11-19 | End: 2019-11-19

## 2019-11-18 RX ORDER — PROMETHAZINE HYDROCHLORIDE 25 MG/ML
6.25 INJECTION, SOLUTION INTRAMUSCULAR; INTRAVENOUS EVERY 10 MIN PRN
Status: DISCONTINUED | OUTPATIENT
Start: 2019-11-18 | End: 2019-11-18 | Stop reason: HOSPADM

## 2019-11-18 RX ORDER — PROPOFOL 10 MG/ML
INJECTION, EMULSION INTRAVENOUS PRN
Status: DISCONTINUED | OUTPATIENT
Start: 2019-11-18 | End: 2019-11-18 | Stop reason: SDUPTHER

## 2019-11-18 RX ORDER — 0.9 % SODIUM CHLORIDE 0.9 %
500 INTRAVENOUS SOLUTION INTRAVENOUS ONCE
Status: COMPLETED | OUTPATIENT
Start: 2019-11-18 | End: 2019-11-18

## 2019-11-18 RX ORDER — LEVOTHYROXINE SODIUM 0.05 MG/1
50 TABLET ORAL NIGHTLY
Status: DISCONTINUED | OUTPATIENT
Start: 2019-11-18 | End: 2019-11-19 | Stop reason: HOSPADM

## 2019-11-18 RX ORDER — CLONIDINE HYDROCHLORIDE 0.1 MG/1
0.1 TABLET ORAL 2 TIMES DAILY
Status: DISCONTINUED | OUTPATIENT
Start: 2019-11-18 | End: 2019-11-19 | Stop reason: HOSPADM

## 2019-11-18 RX ORDER — MORPHINE SULFATE 2 MG/ML
2 INJECTION, SOLUTION INTRAMUSCULAR; INTRAVENOUS EVERY 5 MIN PRN
Status: DISCONTINUED | OUTPATIENT
Start: 2019-11-18 | End: 2019-11-18 | Stop reason: HOSPADM

## 2019-11-18 RX ORDER — ISOSORBIDE MONONITRATE 30 MG/1
30 TABLET, EXTENDED RELEASE ORAL DAILY
Status: DISCONTINUED | OUTPATIENT
Start: 2019-11-18 | End: 2019-11-19 | Stop reason: HOSPADM

## 2019-11-18 RX ORDER — PANTOPRAZOLE SODIUM 40 MG/10ML
40 INJECTION, POWDER, LYOPHILIZED, FOR SOLUTION INTRAVENOUS 2 TIMES DAILY
Status: DISCONTINUED | OUTPATIENT
Start: 2019-11-18 | End: 2019-11-18

## 2019-11-18 RX ORDER — SODIUM CHLORIDE 0.9 % (FLUSH) 0.9 %
10 SYRINGE (ML) INJECTION EVERY 12 HOURS SCHEDULED
Status: DISCONTINUED | OUTPATIENT
Start: 2019-11-18 | End: 2019-11-19 | Stop reason: HOSPADM

## 2019-11-18 RX ORDER — DONEPEZIL HYDROCHLORIDE 5 MG/1
5 TABLET, FILM COATED ORAL DAILY
Status: DISCONTINUED | OUTPATIENT
Start: 2019-11-18 | End: 2019-11-19 | Stop reason: HOSPADM

## 2019-11-18 RX ORDER — SODIUM CHLORIDE 0.9 % (FLUSH) 0.9 %
10 SYRINGE (ML) INJECTION PRN
Status: DISCONTINUED | OUTPATIENT
Start: 2019-11-18 | End: 2019-11-19 | Stop reason: HOSPADM

## 2019-11-18 RX ORDER — ASPIRIN 325 MG
325 TABLET ORAL DAILY
Status: ON HOLD | COMMUNITY
End: 2019-11-19 | Stop reason: HOSPADM

## 2019-11-18 RX ORDER — METOPROLOL TARTRATE 50 MG/1
100 TABLET, FILM COATED ORAL 2 TIMES DAILY
Status: DISCONTINUED | OUTPATIENT
Start: 2019-11-18 | End: 2019-11-19 | Stop reason: HOSPADM

## 2019-11-18 RX ORDER — SODIUM CHLORIDE 9 MG/ML
INJECTION, SOLUTION INTRAVENOUS CONTINUOUS
Status: DISCONTINUED | OUTPATIENT
Start: 2019-11-18 | End: 2019-11-18

## 2019-11-18 RX ORDER — ACETAMINOPHEN 325 MG/1
650 TABLET ORAL EVERY 4 HOURS PRN
Status: DISCONTINUED | OUTPATIENT
Start: 2019-11-18 | End: 2019-11-19 | Stop reason: HOSPADM

## 2019-11-18 RX ORDER — HYDROCODONE BITARTRATE AND ACETAMINOPHEN 5; 325 MG/1; MG/1
1 TABLET ORAL PRN
Status: DISCONTINUED | OUTPATIENT
Start: 2019-11-18 | End: 2019-11-18 | Stop reason: HOSPADM

## 2019-11-18 RX ORDER — 0.9 % SODIUM CHLORIDE 0.9 %
500 INTRAVENOUS SOLUTION INTRAVENOUS ONCE
Status: DISCONTINUED | OUTPATIENT
Start: 2019-11-18 | End: 2019-11-18

## 2019-11-18 RX ORDER — MORPHINE SULFATE 2 MG/ML
1 INJECTION, SOLUTION INTRAMUSCULAR; INTRAVENOUS EVERY 5 MIN PRN
Status: DISCONTINUED | OUTPATIENT
Start: 2019-11-18 | End: 2019-11-18 | Stop reason: HOSPADM

## 2019-11-18 RX ORDER — LOSARTAN POTASSIUM 50 MG/1
100 TABLET ORAL EVERY MORNING
Status: DISCONTINUED | OUTPATIENT
Start: 2019-11-18 | End: 2019-11-19 | Stop reason: HOSPADM

## 2019-11-18 RX ORDER — HYDROCODONE BITARTRATE AND ACETAMINOPHEN 5; 325 MG/1; MG/1
2 TABLET ORAL PRN
Status: DISCONTINUED | OUTPATIENT
Start: 2019-11-18 | End: 2019-11-18 | Stop reason: HOSPADM

## 2019-11-18 RX ORDER — PANTOPRAZOLE SODIUM 40 MG/10ML
40 INJECTION, POWDER, LYOPHILIZED, FOR SOLUTION INTRAVENOUS ONCE
Status: COMPLETED | OUTPATIENT
Start: 2019-11-18 | End: 2019-11-18

## 2019-11-18 RX ORDER — MEPERIDINE HYDROCHLORIDE 25 MG/ML
12.5 INJECTION INTRAMUSCULAR; INTRAVENOUS; SUBCUTANEOUS EVERY 5 MIN PRN
Status: DISCONTINUED | OUTPATIENT
Start: 2019-11-18 | End: 2019-11-18 | Stop reason: HOSPADM

## 2019-11-18 RX ADMIN — LEVOTHYROXINE SODIUM 50 MCG: 50 TABLET ORAL at 20:26

## 2019-11-18 RX ADMIN — CLONIDINE HYDROCHLORIDE 0.1 MG: 0.1 TABLET ORAL at 20:26

## 2019-11-18 RX ADMIN — PANTOPRAZOLE SODIUM 40 MG: 40 INJECTION, POWDER, FOR SOLUTION INTRAVENOUS at 01:15

## 2019-11-18 RX ADMIN — METOPROLOL TARTRATE 100 MG: 50 TABLET, FILM COATED ORAL at 09:15

## 2019-11-18 RX ADMIN — SODIUM CHLORIDE 500 ML: 9 INJECTION, SOLUTION INTRAVENOUS at 01:15

## 2019-11-18 RX ADMIN — METOPROLOL TARTRATE 100 MG: 50 TABLET, FILM COATED ORAL at 20:28

## 2019-11-18 RX ADMIN — SUCRALFATE 1 G: 1 TABLET ORAL at 23:40

## 2019-11-18 RX ADMIN — SUCRALFATE 1 G: 1 TABLET ORAL at 20:25

## 2019-11-18 RX ADMIN — PANTOPRAZOLE SODIUM 40 MG: 40 INJECTION, POWDER, FOR SOLUTION INTRAVENOUS at 09:15

## 2019-11-18 RX ADMIN — Medication 10 ML: at 09:15

## 2019-11-18 RX ADMIN — PROPOFOL 60 MG: 10 INJECTION, EMULSION INTRAVENOUS at 17:17

## 2019-11-18 RX ADMIN — SODIUM CHLORIDE: 9 INJECTION, SOLUTION INTRAVENOUS at 17:13

## 2019-11-18 RX ADMIN — SODIUM CHLORIDE: 9 INJECTION, SOLUTION INTRAVENOUS at 02:45

## 2019-11-18 ASSESSMENT — ENCOUNTER SYMPTOMS
SHORTNESS OF BREATH: 0
WHEEZING: 0
EYE REDNESS: 0
SINUS PRESSURE: 0
SORE THROAT: 0
DIARRHEA: 0
EYE DISCHARGE: 0
NAUSEA: 0
ABDOMINAL DISTENTION: 0
EYE PAIN: 0
BACK PAIN: 0
COUGH: 0
VOMITING: 0

## 2019-11-18 ASSESSMENT — PAIN SCALES - GENERAL
PAINLEVEL_OUTOF10: 0
PAINLEVEL_OUTOF10: 0

## 2019-11-19 VITALS
SYSTOLIC BLOOD PRESSURE: 120 MMHG | DIASTOLIC BLOOD PRESSURE: 55 MMHG | BODY MASS INDEX: 28.51 KG/M2 | TEMPERATURE: 97.8 F | HEIGHT: 61 IN | OXYGEN SATURATION: 97 % | HEART RATE: 60 BPM | RESPIRATION RATE: 14 BRPM | WEIGHT: 151 LBS

## 2019-11-19 PROBLEM — K25.0 ACUTE GASTRIC ULCER WITH HEMORRHAGE: Status: ACTIVE | Noted: 2019-11-18

## 2019-11-19 LAB
ALBUMIN SERPL-MCNC: 3.5 G/DL (ref 3.5–5.2)
ALP BLD-CCNC: 59 U/L (ref 35–104)
ALT SERPL-CCNC: 12 U/L (ref 0–32)
ANION GAP SERPL CALCULATED.3IONS-SCNC: 11 MMOL/L (ref 7–16)
AST SERPL-CCNC: 14 U/L (ref 0–31)
BASOPHILS ABSOLUTE: 0.03 E9/L (ref 0–0.2)
BASOPHILS RELATIVE PERCENT: 0.5 % (ref 0–2)
BILIRUB SERPL-MCNC: 0.3 MG/DL (ref 0–1.2)
BUN BLDV-MCNC: 47 MG/DL (ref 8–23)
CALCIUM SERPL-MCNC: 9 MG/DL (ref 8.6–10.2)
CHLORIDE BLD-SCNC: 107 MMOL/L (ref 98–107)
CO2: 25 MMOL/L (ref 22–29)
CREAT SERPL-MCNC: 0.8 MG/DL (ref 0.5–1)
EOSINOPHILS ABSOLUTE: 0.13 E9/L (ref 0.05–0.5)
EOSINOPHILS RELATIVE PERCENT: 2 % (ref 0–6)
GFR AFRICAN AMERICAN: >60
GFR NON-AFRICAN AMERICAN: >60 ML/MIN/1.73
GLUCOSE BLD-MCNC: 146 MG/DL (ref 74–99)
HCT VFR BLD CALC: 26.7 % (ref 34–48)
HEMOGLOBIN: 8.3 G/DL (ref 11.5–15.5)
IMMATURE GRANULOCYTES #: 0.02 E9/L
IMMATURE GRANULOCYTES %: 0.3 % (ref 0–5)
LYMPHOCYTES ABSOLUTE: 1.52 E9/L (ref 1.5–4)
LYMPHOCYTES RELATIVE PERCENT: 23.4 % (ref 20–42)
MCH RBC QN AUTO: 31.1 PG (ref 26–35)
MCHC RBC AUTO-ENTMCNC: 31.1 % (ref 32–34.5)
MCV RBC AUTO: 100 FL (ref 80–99.9)
MONOCYTES ABSOLUTE: 0.68 E9/L (ref 0.1–0.95)
MONOCYTES RELATIVE PERCENT: 10.5 % (ref 2–12)
NEUTROPHILS ABSOLUTE: 4.12 E9/L (ref 1.8–7.3)
NEUTROPHILS RELATIVE PERCENT: 63.3 % (ref 43–80)
PDW BLD-RTO: 13.3 FL (ref 11.5–15)
PLATELET # BLD: 136 E9/L (ref 130–450)
PMV BLD AUTO: 12.4 FL (ref 7–12)
POTASSIUM REFLEX MAGNESIUM: 4 MMOL/L (ref 3.5–5)
RBC # BLD: 2.67 E12/L (ref 3.5–5.5)
SODIUM BLD-SCNC: 143 MMOL/L (ref 132–146)
TOTAL PROTEIN: 5.4 G/DL (ref 6.4–8.3)
WBC # BLD: 6.5 E9/L (ref 4.5–11.5)

## 2019-11-19 PROCEDURE — 6370000000 HC RX 637 (ALT 250 FOR IP): Performed by: INTERNAL MEDICINE

## 2019-11-19 PROCEDURE — 80053 COMPREHEN METABOLIC PANEL: CPT

## 2019-11-19 PROCEDURE — 36415 COLL VENOUS BLD VENIPUNCTURE: CPT

## 2019-11-19 PROCEDURE — 6370000000 HC RX 637 (ALT 250 FOR IP): Performed by: STUDENT IN AN ORGANIZED HEALTH CARE EDUCATION/TRAINING PROGRAM

## 2019-11-19 PROCEDURE — 2580000003 HC RX 258: Performed by: INTERNAL MEDICINE

## 2019-11-19 PROCEDURE — 85025 COMPLETE CBC W/AUTO DIFF WBC: CPT

## 2019-11-19 PROCEDURE — 6370000000 HC RX 637 (ALT 250 FOR IP): Performed by: SURGERY

## 2019-11-19 RX ORDER — PANTOPRAZOLE SODIUM 40 MG/1
40 TABLET, DELAYED RELEASE ORAL
Status: DISCONTINUED | OUTPATIENT
Start: 2019-11-19 | End: 2019-11-19 | Stop reason: HOSPADM

## 2019-11-19 RX ORDER — PANTOPRAZOLE SODIUM 40 MG/1
40 TABLET, DELAYED RELEASE ORAL
Qty: 60 TABLET | Refills: 0 | Status: SHIPPED | OUTPATIENT
Start: 2019-11-19

## 2019-11-19 RX ORDER — SUCRALFATE 1 G/1
1 TABLET ORAL 4 TIMES DAILY
Qty: 120 TABLET | Refills: 0 | Status: SHIPPED | OUTPATIENT
Start: 2019-11-19 | End: 2020-01-01 | Stop reason: ALTCHOICE

## 2019-11-19 RX ADMIN — Medication 10 ML: at 09:12

## 2019-11-19 RX ADMIN — SUCRALFATE 1 G: 1 TABLET ORAL at 11:55

## 2019-11-19 RX ADMIN — CLONIDINE HYDROCHLORIDE 0.1 MG: 0.1 TABLET ORAL at 09:13

## 2019-11-19 RX ADMIN — PANTOPRAZOLE SODIUM 40 MG: 40 TABLET, DELAYED RELEASE ORAL at 07:38

## 2019-11-19 RX ADMIN — ISOSORBIDE MONONITRATE 30 MG: 30 TABLET, EXTENDED RELEASE ORAL at 09:13

## 2019-11-19 RX ADMIN — METOPROLOL TARTRATE 100 MG: 50 TABLET, FILM COATED ORAL at 09:13

## 2019-11-19 RX ADMIN — DONEPEZIL HYDROCHLORIDE 5 MG: 5 TABLET, FILM COATED ORAL at 09:13

## 2019-11-19 RX ADMIN — LOSARTAN POTASSIUM 100 MG: 50 TABLET, FILM COATED ORAL at 09:13

## 2019-11-19 RX ADMIN — SUCRALFATE 1 G: 1 TABLET ORAL at 07:39

## 2019-11-19 ASSESSMENT — PAIN SCALES - GENERAL: PAINLEVEL_OUTOF10: 0

## 2019-12-03 ENCOUNTER — HOSPITAL ENCOUNTER (OUTPATIENT)
Age: 84
Discharge: HOME | End: 2019-12-05
Payer: MEDICARE

## 2019-12-03 LAB
BASOPHILS ABSOLUTE: 0.04 E9/L (ref 0–0.2)
BASOPHILS RELATIVE PERCENT: 0.7 % (ref 0–2)
EOSINOPHILS ABSOLUTE: 0.06 E9/L (ref 0.05–0.5)
EOSINOPHILS RELATIVE PERCENT: 1 % (ref 0–6)
HCT VFR BLD CALC: 31.3 % (ref 34–48)
HEMOGLOBIN: 9.3 G/DL (ref 11.5–15.5)
IMMATURE GRANULOCYTES #: 0.06 E9/L
IMMATURE GRANULOCYTES %: 1 % (ref 0–5)
LYMPHOCYTES ABSOLUTE: 1.34 E9/L (ref 1.5–4)
LYMPHOCYTES RELATIVE PERCENT: 22.4 % (ref 20–42)
MCH RBC QN AUTO: 29.9 PG (ref 26–35)
MCHC RBC AUTO-ENTMCNC: 29.7 % (ref 32–34.5)
MCV RBC AUTO: 100.6 FL (ref 80–99.9)
MONOCYTES ABSOLUTE: 0.62 E9/L (ref 0.1–0.95)
MONOCYTES RELATIVE PERCENT: 10.4 % (ref 2–12)
NEUTROPHILS ABSOLUTE: 3.85 E9/L (ref 1.8–7.3)
NEUTROPHILS RELATIVE PERCENT: 64.5 % (ref 43–80)
PDW BLD-RTO: 14.5 FL (ref 11.5–15)
PLATELET # BLD: 205 E9/L (ref 130–450)
PMV BLD AUTO: 12.8 FL (ref 7–12)
RBC # BLD: 3.11 E12/L (ref 3.5–5.5)
WBC # BLD: 6 E9/L (ref 4.5–11.5)

## 2019-12-03 PROCEDURE — 85025 COMPLETE CBC W/AUTO DIFF WBC: CPT

## 2019-12-12 ENCOUNTER — HOSPITAL ENCOUNTER (OUTPATIENT)
Age: 84
Discharge: HOME | End: 2019-12-14
Payer: MEDICARE

## 2019-12-12 LAB
HCT VFR BLD CALC: 36.5 % (ref 34–48)
HEMOGLOBIN: 11.3 G/DL (ref 11.5–15.5)
MCH RBC QN AUTO: 30.2 PG (ref 26–35)
MCHC RBC AUTO-ENTMCNC: 31 % (ref 32–34.5)
MCV RBC AUTO: 97.6 FL (ref 80–99.9)
PDW BLD-RTO: 13.7 FL (ref 11.5–15)
PLATELET # BLD: 204 E9/L (ref 130–450)
PMV BLD AUTO: 13.1 FL (ref 7–12)
RBC # BLD: 3.74 E12/L (ref 3.5–5.5)
WBC # BLD: 6.2 E9/L (ref 4.5–11.5)

## 2019-12-12 PROCEDURE — 85027 COMPLETE CBC AUTOMATED: CPT

## 2020-01-01 ENCOUNTER — PROCEDURE VISIT (OUTPATIENT)
Dept: PODIATRY | Age: 85
End: 2020-01-01
Payer: MEDICARE

## 2020-01-01 VITALS
SYSTOLIC BLOOD PRESSURE: 132 MMHG | BODY MASS INDEX: 28.12 KG/M2 | TEMPERATURE: 97.1 F | WEIGHT: 144 LBS | DIASTOLIC BLOOD PRESSURE: 78 MMHG

## 2020-01-01 VITALS — SYSTOLIC BLOOD PRESSURE: 128 MMHG | TEMPERATURE: 97.1 F | DIASTOLIC BLOOD PRESSURE: 78 MMHG

## 2020-01-01 PROCEDURE — 11055 PARING/CUTG B9 HYPRKER LES 1: CPT | Performed by: PODIATRIST

## 2020-01-01 PROCEDURE — 99999 PR OFFICE/OUTPT VISIT,PROCEDURE ONLY: CPT | Performed by: PODIATRIST

## 2020-01-01 PROCEDURE — 11721 DEBRIDE NAIL 6 OR MORE: CPT | Performed by: PODIATRIST

## 2020-01-13 ENCOUNTER — PROCEDURE VISIT (OUTPATIENT)
Dept: PODIATRY | Age: 85
End: 2020-01-13
Payer: MEDICARE

## 2020-01-13 VITALS
SYSTOLIC BLOOD PRESSURE: 123 MMHG | DIASTOLIC BLOOD PRESSURE: 78 MMHG | BODY MASS INDEX: 29.45 KG/M2 | WEIGHT: 150 LBS | HEIGHT: 60 IN | TEMPERATURE: 97.8 F

## 2020-01-13 PROCEDURE — 11721 DEBRIDE NAIL 6 OR MORE: CPT | Performed by: PODIATRIST

## 2020-01-13 PROCEDURE — 11055 PARING/CUTG B9 HYPRKER LES 1: CPT | Performed by: PODIATRIST

## 2020-01-13 NOTE — PROGRESS NOTES
MG tablet Take 2 tablets by mouth 2 times daily (Patient taking differently: Take 100 mg by mouth 2 times daily ) 60 tablet 0    calcium carbonate 600 MG TABS tablet Take 2 tablets by mouth daily      losartan (COZAAR) 100 MG tablet Take 100 mg by mouth every morning      Acetaminophen (TYLENOL 8 HOUR PO) Take  by mouth.  potassium chloride SA (K-DUR;KLOR-CON M) 10 MEQ tablet Take 1 tablet by mouth daily (with breakfast). 60 tablet 3    levothyroxine (SYNTHROID) 150 MCG tablet Take 50 mcg by mouth nightly 50mcg is correct dose      Glucosamine Sulfate 750 MG TABS Take 2 tablets by mouth daily. No current facility-administered medications on file prior to visit. Review of Systems  Objective:  General: AAO x 3 in NAD. Derm  Toenail Description  Sites of Onychomycosis Involvement (Check affected area)  [x] [x] [x] [x] [x] [x] [x] [x] [x] [x]  5 4 3 2 1 1 2 3 4 5                          Right                                        Left    Thickness  [x] [x] [x] [x] [x] [x] [x] [x] [x] [x]  5 4 3 2 1 1 2 3 4 5                         Right                                        Left    Dystrophic Changes   [x] [x] [x] [x] [x] [x] [x] [x] [x] [x]  5 4 3 2 1 1 2 3 4 5                         Right                                        Left    Color  [x] [x] [x] [x] [x] [x] [x] [x] [x] [x]  5 4 3 2 1 1 2 3 4 5                          Right                                        Left    Incurvation/Ingrowin   [x] [x] [x] [x] [x] [x] [x] [x] [x] [x]  5 4 3 2 1 1 2 3 4 5                         Right                                        Left    Inflammation/Pain   [x] [x] [x] [x] [x] [x] [x] [x] [x] [x]  5 4 3  2 1 1 2 3 4 5                         Right                                        Left      Nails that are described above are all elongated thickened pitting mycotic yellowish incurvated causing pain with both shoe gear.  Palpation nails greater then 3 mm thick painful       Dermatologic Exam:hair loss noted  lower extremity    Skin lesion/ulceration   Skin   Callus   Musculoskeletal:     1st MPJ ROM normal, Bilateral.  Muscle strength 5/5, Bilateral.  Pain present upon palpation of toenails 1-5  Bilateral., Bilateral.  Ankle ROM normal,Bilateral.    Dorsally contracted digits 2-5, Bilateral.  The ulcer that is located on the third toe left foot again is nondraining mildly painful no erythematous no exposed bone or tendon    Vascular:  Pulses   bilateral DP absent    PT absent    Neurological:  Sensation present to light touch to level of digits, Bilateral.    Foot Exam    General  General Appearance: appears stated age and healthy   Orientation: alert and oriented to person, place, and time   Assistance: cane use       Right Foot/Ankle     Inspection and Palpation  Hammertoes: fourth toe, fifth toe, second toe and third toe  Claw Toes: second toe, fifth toe, fourth toe and third toe  Hallux valgus: yes  Skin Exam: callus (callus  sub 2  right foot 3cm painful plantar to   2nd mpj  ), skin changes and abnormal color; Neurovascular  Dorsalis pedis: absent  Posterior tibial: absent      Left Foot/Ankle      Inspection and Palpation  Hammertoes: second toe and third toe  Skin Exam: skin changes and abnormal color; no ulcer (c)     Neurovascular  Dorsalis pedis: absent  Posterior tibial: absent             Ortho Exam  Q7   []Yes    []No                Q8   [x]Yes    []No                     Q9   []Yes    []No  Assessment:  80 y.o. female with:   1. Corn or callus    2. Tinea unguium    3. Pain in left toe(s)    4. Peripheral vascular disease, unspecified ([address]    5. Pain in toe of right foot    6. Difficulty walking       No orders of the defined types were placed in this encounter. Plan: CALLUS DEBRIDEMENT  X 1    Verbal informed content was obtained from the patient. The callus lesion on the right foot was debrided with a sterile 15 blade to sub q.  It was then debrided,  padded, and an antibiotic ointment was applied to the treated area. Patient tolerated the procedure well with no complications. Pt was evaluated and examined. Patient was given personalized discharge instructions. Nails 1-10 were debrided in length and thickness sharply with a nail nipper and  without incident. Pt will follow up in 9 weeks or sooner if any problems arise. Diagnosis was discussed with the pt and all of their questions were answered in detail. Proper foot hygiene and care was discussed with the pt. Patient to check feet daily and contact the office with any questions/problems/concerns. Other comorbidity noted and will be managed by PCP. Pain waiver discussed with patient and confirmed.    Today I examined the ulcer the patient is to apply bacitracin ointment with a gel tube daily if this becomes any worse the patient is to call me in the next week  Custom shoes for hammertoes      Electronically signed by Dottie Ortiz DPM on 1/13/2020 at 10:55 AM  1/13/2020

## 2020-06-01 ENCOUNTER — PROCEDURE VISIT (OUTPATIENT)
Dept: PODIATRY | Age: 85
End: 2020-06-01
Payer: MEDICARE

## 2020-06-01 VITALS
HEIGHT: 60 IN | SYSTOLIC BLOOD PRESSURE: 130 MMHG | DIASTOLIC BLOOD PRESSURE: 85 MMHG | BODY MASS INDEX: 28.27 KG/M2 | WEIGHT: 144 LBS | TEMPERATURE: 97.2 F

## 2020-06-01 PROCEDURE — 11721 DEBRIDE NAIL 6 OR MORE: CPT | Performed by: PODIATRIST

## 2020-06-01 PROCEDURE — 11055 PARING/CUTG B9 HYPRKER LES 1: CPT | Performed by: PODIATRIST

## 2020-06-01 NOTE — PROGRESS NOTES
Sarah Keller  Return Patient    Chief Complaint   Patient presents with    Toe Pain     saw PCP Dr. Xiomara Rashid 5/20/2020       Subjective: This Edwin Clap comes to clinic for foot and nail care. Pt currently has complaint of thickened, painful, elongated nails that he/she cannot manage by themselves. Pt. Relates pain to nails with shoe gear. Pt's primary care physician is Steven Gonzalez MD.  Past Medical History:   Diagnosis Date    Acute on chronic congestive [facility] failure ([facility] [address]     Acute respiratory failure with hypoxia ([facility] [address] 12/31/2017    Arthritis     Atrial fibrillation (HCC)     Back pain 4-5 years    CAD (coronary artery disease)     Chronic diastolic CHF (congestive heart failure) ([facility] [address] 11/17/2017    Difficulty walking 4-5 years    Hypertension     Hypothyroidism     Osteoarthritis     Pneumonia due to organism 12/30/2017    Rheumatoid arthritis ([facility] [address] 5/4/2012    Spinal stenosis     Urinary tract infection, acute        Allergies   Allergen Reactions    Penicillins Hives    Sulfa Antibiotics Hives     Current Outpatient Medications on File Prior to Visit   Medication Sig Dispense Refill    pantoprazole (PROTONIX) 40 MG tablet Take 1 tablet by mouth 2 times daily (before meals) 60 tablet 0    sucralfate (CARAFATE) 1 GM tablet Take 1 tablet by mouth 4 times daily 120 tablet 0    donepezil (ARICEPT) 5 MG tablet Take 5 mg by mouth daily  0    gabapentin (NEURONTIN) 100 MG capsule Take 100 mg by mouth nightly.       furosemide (LASIX) 20 MG tablet Take 2 tablets by mouth 2 times daily 60 tablet 0    spironolactone (ALDACTONE) 25 MG tablet Take 0.5 tablets by mouth daily 30 tablet 0    cloNIDine (CATAPRES) 0.1 MG tablet Take 1 tablet by mouth 3 times daily (Patient taking differently: Take 0.1 mg by mouth 2 times daily ) 90 tablet 0    isosorbide mononitrate (IMDUR) 30 MG extended release tablet Take 1 tablet by mouth daily 30 tablet 0    metoprolol tartrate (LOPRESSOR) 25

## 2020-08-24 NOTE — PROGRESS NOTES
600 MG TABS tablet Take 2 tablets by mouth daily      losartan (COZAAR) 100 MG tablet Take 100 mg by mouth every morning      Acetaminophen (TYLENOL 8 HOUR PO) Take  by mouth.  potassium chloride SA (K-DUR;KLOR-CON M) 10 MEQ tablet Take 1 tablet by mouth daily (with breakfast). 60 tablet 3    levothyroxine (SYNTHROID) 150 MCG tablet Take 50 mcg by mouth nightly 50mcg is correct dose      Glucosamine Sulfate 750 MG TABS Take 2 tablets by mouth daily.  sucralfate (CARAFATE) 1 GM tablet Take 1 tablet by mouth 4 times daily (Patient not taking: Reported on 8/24/2020) 120 tablet 0    gabapentin (NEURONTIN) 100 MG capsule Take 100 mg by mouth nightly. No current facility-administered medications on file prior to visit. Review of Systems   Cardiovascular: Negative. Genitourinary: Negative. Objective:  General: AAO x 3 in NAD.     Derm  Toenail Description  Sites of Onychomycosis Involvement (Check affected area)  [x] [x] [x] [x] [x] [x] [x] [x] [x] [x]  5 4 3 2 1 1 2 3 4 5                          Right                                        Left    Thickness  [x] [x] [x] [x] [x] [x] [x] [x] [x] [x]  5 4 3 2 1 1 2 3 4 5                         Right                                        Left    Dystrophic Changes   [x] [x] [x] [x] [x] [x] [x] [x] [x] [x]  5 4 3 2 1 1 2 3 4 5                         Right                                        Left    Color  [x] [x] [x] [x] [x] [x] [x] [x] [x] [x]  5 4 3 2 1 1 2 3 4 5                          Right                                        Left    Incurvation/Ingrowin   [x] [x] [x] [x] [x] [x] [x] [x] [x] [x]  5 4 3 2 1 1 2 3 4 5                         Right                                        Left    Inflammation/Pain   [x] [x] [x] [x] [x] [x] [x] [x] [x] [x]  5 4 3  2 1 1 2 3 4 5                         Right                                        Left      Nails that are described above are all elongated thickened pitting mycotic yellowish incurvated causing pain with both shoe gear. Palpation nails greater then 3 mm thick painful       Dermatologic Exam:hair loss noted  lower extremity    Skin lesion/ulceration   Skin   Callus   Musculoskeletal:     1st MPJ ROM normal, Bilateral.  Muscle strength 5/5, Bilateral.  Pain present upon palpation of toenails 1-5  Bilateral., Bilateral.  Ankle ROM normal,Bilateral.    Dorsally contracted digits 2-5, Bilateral.  The ulcer that is located on the third toe left foot again is nondraining mildly painful no erythematous no exposed bone or tendon    Vascular:  Pulses   bilateral DP absent    PT absent    Neurological:  Sensation present to light touch to level of digits, Bilateral.    Foot Exam    General  General Appearance: appears stated age and healthy   Orientation: alert and oriented to person, place, and time   Assistance: cane use       Right Foot/Ankle     Inspection and Palpation  Hammertoes: fourth toe, fifth toe, second toe and third toe  Claw Toes: second toe, fifth toe, fourth toe and third toe  Hallux valgus: yes  Skin Exam: callus (callus  sub 2  right foot 3cm painful plantar to   2nd mpj  ), skin changes and abnormal color; Neurovascular  Dorsalis pedis: absent  Posterior tibial: absent      Left Foot/Ankle      Inspection and Palpation  Hammertoes: second toe and third toe  Skin Exam: skin changes and abnormal color; no ulcer (c)     Neurovascular  Dorsalis pedis: absent  Posterior tibial: absent             Ortho Exam  Q7   []Yes    []No                Q8   [x]Yes    []No                     Q9   []Yes    []No  Assessment:  80 y.o. female with:   1. Tinea unguium    2. Pain in left toe(s)    3. Peripheral vascular disease, unspecified ([address]    4. Pain in toe of right foot    5. Difficulty walking       No orders of the defined types were placed in this encounter. Plan:  Pt will follow up in 9 weeks or sooner if any problems arise.  Diagnosis was discussed with the pt and all of their questions were answered in detail. Proper foot hygiene and care was discussed with the pt. Patient to check feet daily and contact the office with any questions/problems/concerns. Other comorbidity noted and will be managed by PCP. Pain waiver discussed with patient and confirmed. Debridement of all painful nails was performed with both manual and electrical debridement to prevent infection and/or ulceration. Patient tolerated the debridement well, without any complaints.   Patient was asymptomatic after debridement    1-5 bilateral    Electronically signed by Spring Coffman DPM on 8/24/2020 at 10:37 AM  8/24/2020

## 2020-11-03 PROBLEM — I25.10 CAD (CORONARY ARTERY DISEASE): Status: RESOLVED | Noted: 2020-01-01 | Resolved: 2020-01-01

## 2020-11-16 NOTE — PROGRESS NOTES
Gallo Arrow  Return Patient    Chief Complaint   Patient presents with    Toe Pain     saw pcp Dr. Lilian Booker on 10/7/2020       Subjective: This Dorcus Hence comes to clinic for foot and nail care. Pt currently has complaint of thickened, painful, elongated nails that he/she cannot manage by themselves. Pt. Relates pain to nails with shoe gear. Pt's primary care physician is Amita Trinidad MD.  Past Medical History:   Diagnosis Date    Acute on chronic congestive heart failure ([facility] [address]     Acute respiratory failure with hypoxia ([facility] [address] 12/31/2017    Arthritis     Atrial fibrillation (HCC)     Back pain 4-5 years    CAD (coronary artery disease)     Chronic diastolic CHF (congestive heart failure) ([facility] [address] 11/17/2017    Difficulty walking 4-5 years    Hypertension     Hypothyroidism     Osteoarthritis     Pneumonia due to organism 12/30/2017    Rheumatoid arthritis ([facility] [address] 5/4/2012    Spinal stenosis     Urinary tract infection, acute        Allergies   Allergen Reactions    Penicillins Hives    Sulfa Antibiotics Hives     Current Outpatient Medications on File Prior to Visit   Medication Sig Dispense Refill    pantoprazole (PROTONIX) 40 MG tablet Take 1 tablet by mouth 2 times daily (before meals) 60 tablet 0    donepezil (ARICEPT) 5 MG tablet Take 5 mg by mouth daily  0    gabapentin (NEURONTIN) 100 MG capsule Take 100 mg by mouth nightly.       furosemide (LASIX) 20 MG tablet Take 2 tablets by mouth 2 times daily 60 tablet 0    spironolactone (ALDACTONE) 25 MG tablet Take 0.5 tablets by mouth daily 30 tablet 0    cloNIDine (CATAPRES) 0.1 MG tablet Take 1 tablet by mouth 3 times daily (Patient taking differently: Take 0.1 mg by mouth 2 times daily ) 90 tablet 0    isosorbide mononitrate (IMDUR) 30 MG extended release tablet Take 1 tablet by mouth daily 30 tablet 0    metoprolol tartrate (LOPRESSOR) 25 MG tablet Take 2 tablets by mouth 2 times daily (Patient taking differently: Take 100 mg Musculoskeletal:     1st MPJ ROM normal, Bilateral.  Muscle strength 5/5, Bilateral.  Pain present upon palpation of toenails 1-5  Bilateral., Bilateral.  Ankle ROM normal,Bilateral.    Dorsally contracted digits 2-5, Bilateral.  The ulcer that is located on the third toe left foot again is nondraining mildly painful no erythematous no exposed bone or tendon    Vascular:  Pulses   bilateral DP absent    PT absent    Neurological:  Sensation present to light touch to level of digits, Bilateral.    Foot Exam    General  General Appearance: appears stated age and healthy   Orientation: alert and oriented to person, place, and time   Assistance: cane use       Right Foot/Ankle     Inspection and Palpation  Hammertoes: fourth toe, fifth toe, second toe and third toe  Claw Toes: second toe, fifth toe, fourth toe and third toe  Hallux valgus: yes  Skin Exam: callus (callus  sub 2  right foot 3cm painful plantar to   2nd mpj  ), skin changes and abnormal color; Neurovascular  Dorsalis pedis: absent  Posterior tibial: absent      Left Foot/Ankle      Inspection and Palpation  Hammertoes: second toe and third toe  Skin Exam: skin changes and abnormal color; no ulcer (c)     Neurovascular  Dorsalis pedis: absent  Posterior tibial: absent             Ortho Exam  Q7   []Yes    []No                Q8   [x]Yes    []No                     Q9   []Yes    []No  Assessment:  80 y.o. female with:   1. Tinea unguium    2. Pain in left toe(s)    3. Peripheral vascular disease, unspecified ([address]    4. Pain in toe of right foot    5. Difficulty walking    6. Corn or callus       No orders of the defined types were placed in this encounter. Plan:CALLUS DEBRIDEMENT x1  Verbal informed content was obtained from the patient. The callus lesion on the right foot was debrided with a sterile 15 blade to sub q. It was then debrided,  padded, and an antibiotic ointment was applied to the treated area.   Patient tolerated the procedure well with no complications. Pt will follow up in 9 weeks or sooner if any problems arise. Diagnosis was discussed with the pt and all of their questions were answered in detail. Proper foot hygiene and care was discussed with the pt. Patient to check feet daily and contact the office with any questions/problems/concerns. Other comorbidity noted and will be managed by PCP. Pain waiver discussed with patient and confirmed. Debridement of all painful nails was performed with both manual and electrical debridement to prevent infection and/or ulceration. Patient tolerated the debridement well, without any complaints.   Patient was asymptomatic after debridement    1-5 bilateral    Electronically signed by Nohemi Rothman DPM on 11/16/2020 at 12:03 PM  11/16/2020

## 2021-01-01 ENCOUNTER — OUTSIDE SERVICES (OUTPATIENT)
Dept: FAMILY MEDICINE CLINIC | Age: 86
End: 2021-01-01

## 2021-01-01 ENCOUNTER — IMMUNIZATION (OUTPATIENT)
Dept: PRIMARY CARE CLINIC | Age: 86
End: 2021-01-01
Payer: MEDICARE

## 2021-01-01 ENCOUNTER — APPOINTMENT (OUTPATIENT)
Dept: ULTRASOUND IMAGING | Age: 86
DRG: 871 | End: 2021-01-01
Payer: MEDICARE

## 2021-01-01 ENCOUNTER — HOSPITAL ENCOUNTER (INPATIENT)
Age: 86
LOS: 6 days | Discharge: HOSPICE | DRG: 871 | End: 2021-07-30
Attending: EMERGENCY MEDICINE | Admitting: INTERNAL MEDICINE
Payer: MEDICARE

## 2021-01-01 ENCOUNTER — APPOINTMENT (OUTPATIENT)
Dept: CT IMAGING | Age: 86
DRG: 871 | End: 2021-01-01
Payer: MEDICARE

## 2021-01-01 ENCOUNTER — OFFICE VISIT (OUTPATIENT)
Dept: PODIATRY | Age: 86
End: 2021-01-01
Payer: MEDICARE

## 2021-01-01 ENCOUNTER — PROCEDURE VISIT (OUTPATIENT)
Dept: PODIATRY | Age: 86
End: 2021-01-01
Payer: MEDICARE

## 2021-01-01 ENCOUNTER — APPOINTMENT (OUTPATIENT)
Dept: GENERAL RADIOLOGY | Age: 86
DRG: 871 | End: 2021-01-01
Payer: MEDICARE

## 2021-01-01 VITALS
WEIGHT: 150 LBS | SYSTOLIC BLOOD PRESSURE: 126 MMHG | DIASTOLIC BLOOD PRESSURE: 78 MMHG | BODY MASS INDEX: 29.29 KG/M2 | TEMPERATURE: 97.3 F

## 2021-01-01 VITALS
RESPIRATION RATE: 19 BRPM | SYSTOLIC BLOOD PRESSURE: 112 MMHG | WEIGHT: 141.5 LBS | HEART RATE: 61 BPM | BODY MASS INDEX: 24.16 KG/M2 | OXYGEN SATURATION: 94 % | HEIGHT: 64 IN | DIASTOLIC BLOOD PRESSURE: 55 MMHG | TEMPERATURE: 97.7 F

## 2021-01-01 VITALS
WEIGHT: 150 LBS | TEMPERATURE: 98 F | BODY MASS INDEX: 29.29 KG/M2 | SYSTOLIC BLOOD PRESSURE: 122 MMHG | DIASTOLIC BLOOD PRESSURE: 70 MMHG

## 2021-01-01 VITALS — TEMPERATURE: 97.9 F

## 2021-01-01 DIAGNOSIS — I48.20 CHRONIC ATRIAL FIBRILLATION (HCC): Primary | ICD-10-CM

## 2021-01-01 DIAGNOSIS — E03.9 HYPOTHYROIDISM, UNSPECIFIED TYPE: ICD-10-CM

## 2021-01-01 DIAGNOSIS — F02.80 LATE ONSET ALZHEIMER'S DEMENTIA WITHOUT BEHAVIORAL DISTURBANCE (HCC): ICD-10-CM

## 2021-01-01 DIAGNOSIS — I48.0 PAROXYSMAL ATRIAL FIBRILLATION (HCC): Primary | ICD-10-CM

## 2021-01-01 DIAGNOSIS — S82.64XS CLOSED NONDISPLACED FRACTURE OF LATERAL MALLEOLUS OF RIGHT FIBULA, SEQUELA: ICD-10-CM

## 2021-01-01 DIAGNOSIS — M06.9 RHEUMATOID ARTHRITIS INVOLVING MULTIPLE SITES, UNSPECIFIED WHETHER RHEUMATOID FACTOR PRESENT (HCC): ICD-10-CM

## 2021-01-01 DIAGNOSIS — M79.674 PAIN IN TOE OF RIGHT FOOT: ICD-10-CM

## 2021-01-01 DIAGNOSIS — R26.2 DIFFICULTY WALKING: ICD-10-CM

## 2021-01-01 DIAGNOSIS — R77.8 ELEVATED TROPONIN: ICD-10-CM

## 2021-01-01 DIAGNOSIS — I73.9 PERIPHERAL VASCULAR DISEASE, UNSPECIFIED (HCC): ICD-10-CM

## 2021-01-01 DIAGNOSIS — I50.32 CHRONIC DIASTOLIC CHF (CONGESTIVE HEART FAILURE) (HCC): ICD-10-CM

## 2021-01-01 DIAGNOSIS — K59.00 CONSTIPATION, UNSPECIFIED CONSTIPATION TYPE: ICD-10-CM

## 2021-01-01 DIAGNOSIS — G30.1 LATE ONSET ALZHEIMER'S DEMENTIA WITHOUT BEHAVIORAL DISTURBANCE (HCC): ICD-10-CM

## 2021-01-01 DIAGNOSIS — S82.64XD CLOSED NONDISPLACED FRACTURE OF LATERAL MALLEOLUS OF RIGHT FIBULA WITH ROUTINE HEALING, SUBSEQUENT ENCOUNTER: Primary | ICD-10-CM

## 2021-01-01 DIAGNOSIS — S82.831A CLOSED FRACTURE OF DISTAL END OF RIGHT FIBULA, UNSPECIFIED FRACTURE MORPHOLOGY, INITIAL ENCOUNTER: ICD-10-CM

## 2021-01-01 DIAGNOSIS — R63.4 WEIGHT LOSS: Primary | ICD-10-CM

## 2021-01-01 DIAGNOSIS — J18.9 PNEUMONIA OF BOTH LOWER LOBES DUE TO INFECTIOUS ORGANISM: Primary | ICD-10-CM

## 2021-01-01 DIAGNOSIS — I48.20 CHRONIC ATRIAL FIBRILLATION (HCC): ICD-10-CM

## 2021-01-01 DIAGNOSIS — K59.00 CONSTIPATION, UNSPECIFIED CONSTIPATION TYPE: Primary | ICD-10-CM

## 2021-01-01 DIAGNOSIS — S90.01XA CONTUSION OF RIGHT ANKLE, INITIAL ENCOUNTER: Primary | ICD-10-CM

## 2021-01-01 DIAGNOSIS — S82.831A CLOSED FRACTURE OF DISTAL END OF RIGHT FIBULA, UNSPECIFIED FRACTURE MORPHOLOGY, INITIAL ENCOUNTER: Primary | ICD-10-CM

## 2021-01-01 DIAGNOSIS — Z23 NEED FOR VACCINATION: Primary | ICD-10-CM

## 2021-01-01 DIAGNOSIS — M79.675 PAIN IN LEFT TOE(S): ICD-10-CM

## 2021-01-01 DIAGNOSIS — R53.1 GENERALIZED WEAKNESS: ICD-10-CM

## 2021-01-01 DIAGNOSIS — I48.0 PAROXYSMAL ATRIAL FIBRILLATION (HCC): ICD-10-CM

## 2021-01-01 DIAGNOSIS — L89.890 PRESSURE INJURY OF LEFT FOOT, UNSTAGEABLE (HCC): ICD-10-CM

## 2021-01-01 DIAGNOSIS — F32.A DEPRESSION, UNSPECIFIED DEPRESSION TYPE: Primary | ICD-10-CM

## 2021-01-01 DIAGNOSIS — B35.1 TINEA UNGUIUM: Primary | ICD-10-CM

## 2021-01-01 DIAGNOSIS — S82.64XS CLOSED NONDISPLACED FRACTURE OF LATERAL MALLEOLUS OF RIGHT FIBULA, SEQUELA: Primary | ICD-10-CM

## 2021-01-01 DIAGNOSIS — S90.01XA CONTUSION OF RIGHT ANKLE, INITIAL ENCOUNTER: ICD-10-CM

## 2021-01-01 DIAGNOSIS — I27.20 PULMONARY HYPERTENSION (HCC): ICD-10-CM

## 2021-01-01 DIAGNOSIS — B35.1 TINEA UNGUIUM: ICD-10-CM

## 2021-01-01 LAB
ACANTHOCYTES: ABNORMAL
ALBUMIN SERPL-MCNC: 2.4 G/DL (ref 3.5–5.2)
ALBUMIN SERPL-MCNC: 2.6 G/DL (ref 3.5–5.2)
ALBUMIN SERPL-MCNC: 2.6 G/DL (ref 3.5–5.2)
ALBUMIN SERPL-MCNC: 2.7 G/DL (ref 3.5–5.2)
ALBUMIN SERPL-MCNC: 2.9 G/DL (ref 3.5–5.2)
ALBUMIN SERPL-MCNC: 3 G/DL (ref 3.5–5.2)
ALP BLD-CCNC: 101 U/L (ref 35–104)
ALP BLD-CCNC: 101 U/L (ref 35–104)
ALP BLD-CCNC: 116 U/L (ref 35–104)
ALP BLD-CCNC: 119 U/L (ref 35–104)
ALP BLD-CCNC: 136 U/L (ref 35–104)
ALP BLD-CCNC: 94 U/L (ref 35–104)
ALT SERPL-CCNC: 34 U/L (ref 0–32)
ALT SERPL-CCNC: 40 U/L (ref 0–32)
ALT SERPL-CCNC: 41 U/L (ref 0–32)
ALT SERPL-CCNC: 55 U/L (ref 0–32)
ALT SERPL-CCNC: 56 U/L (ref 0–32)
ALT SERPL-CCNC: 64 U/L (ref 0–32)
AMMONIA: 13.3 UMOL/L (ref 11–51)
ANION GAP SERPL CALCULATED.3IONS-SCNC: 13 MMOL/L (ref 7–16)
ANION GAP SERPL CALCULATED.3IONS-SCNC: 7 MMOL/L (ref 7–16)
ANION GAP SERPL CALCULATED.3IONS-SCNC: 8 MMOL/L (ref 7–16)
ANION GAP SERPL CALCULATED.3IONS-SCNC: 9 MMOL/L (ref 7–16)
ANISOCYTOSIS: ABNORMAL
ANISOCYTOSIS: ABNORMAL
AST SERPL-CCNC: 22 U/L (ref 0–31)
AST SERPL-CCNC: 44 U/L (ref 0–31)
AST SERPL-CCNC: 47 U/L (ref 0–31)
AST SERPL-CCNC: 53 U/L (ref 0–31)
AST SERPL-CCNC: 57 U/L (ref 0–31)
AST SERPL-CCNC: 59 U/L (ref 0–31)
B.E.: 1.3 MMOL/L (ref -3–3)
B.E.: 8.4 MMOL/L (ref -3–3)
BACTERIA: ABNORMAL /HPF
BASOPHILIC STIPPLING: ABNORMAL
BASOPHILS ABSOLUTE: 0 E9/L (ref 0–0.2)
BASOPHILS ABSOLUTE: 0.03 E9/L (ref 0–0.2)
BASOPHILS RELATIVE PERCENT: 0 % (ref 0–2)
BASOPHILS RELATIVE PERCENT: 0.5 % (ref 0–2)
BILIRUB SERPL-MCNC: 0.5 MG/DL (ref 0–1.2)
BILIRUB SERPL-MCNC: 0.5 MG/DL (ref 0–1.2)
BILIRUB SERPL-MCNC: 0.6 MG/DL (ref 0–1.2)
BILIRUB SERPL-MCNC: 0.6 MG/DL (ref 0–1.2)
BILIRUB SERPL-MCNC: 0.7 MG/DL (ref 0–1.2)
BILIRUB SERPL-MCNC: 0.8 MG/DL (ref 0–1.2)
BILIRUBIN URINE: ABNORMAL
BLOOD CULTURE, ROUTINE: NORMAL
BLOOD, URINE: ABNORMAL
BUN BLDV-MCNC: 53 MG/DL (ref 6–23)
BUN BLDV-MCNC: 58 MG/DL (ref 6–23)
BUN BLDV-MCNC: 68 MG/DL (ref 6–23)
BUN BLDV-MCNC: 73 MG/DL (ref 6–23)
BUN BLDV-MCNC: 76 MG/DL (ref 6–23)
BUN BLDV-MCNC: 78 MG/DL (ref 6–23)
BURR CELLS: ABNORMAL
CALCIUM SERPL-MCNC: 8.8 MG/DL (ref 8.6–10.2)
CALCIUM SERPL-MCNC: 8.9 MG/DL (ref 8.6–10.2)
CALCIUM SERPL-MCNC: 9 MG/DL (ref 8.6–10.2)
CALCIUM SERPL-MCNC: 9.1 MG/DL (ref 8.6–10.2)
CALCIUM SERPL-MCNC: 9.4 MG/DL (ref 8.6–10.2)
CALCIUM SERPL-MCNC: 9.5 MG/DL (ref 8.6–10.2)
CHLORIDE BLD-SCNC: 102 MMOL/L (ref 98–107)
CHLORIDE BLD-SCNC: 103 MMOL/L (ref 98–107)
CHLORIDE BLD-SCNC: 104 MMOL/L (ref 98–107)
CHLORIDE BLD-SCNC: 105 MMOL/L (ref 98–107)
CHLORIDE URINE RANDOM: <20 MMOL/L
CLARITY: CLEAR
CO2: 24 MMOL/L (ref 22–29)
CO2: 26 MMOL/L (ref 22–29)
CO2: 27 MMOL/L (ref 22–29)
CO2: 29 MMOL/L (ref 22–29)
CO2: 32 MMOL/L (ref 22–29)
CO2: 34 MMOL/L (ref 22–29)
COHB: 0.5 % (ref 0–1.5)
COHB: 0.6 % (ref 0–1.5)
COLOR: ABNORMAL
CREAT SERPL-MCNC: 1 MG/DL (ref 0.5–1)
CREAT SERPL-MCNC: 1.1 MG/DL (ref 0.5–1)
CREAT SERPL-MCNC: 1.2 MG/DL (ref 0.5–1)
CREAT SERPL-MCNC: 1.5 MG/DL (ref 0.5–1)
CREAT SERPL-MCNC: 2.2 MG/DL (ref 0.5–1)
CREAT SERPL-MCNC: 2.3 MG/DL (ref 0.5–1)
CREATININE URINE: 154 MG/DL (ref 29–226)
CRITICAL: ABNORMAL
CRITICAL: ABNORMAL
CULTURE, BLOOD 2: NORMAL
DATE ANALYZED: ABNORMAL
DATE ANALYZED: ABNORMAL
DATE OF COLLECTION: ABNORMAL
DATE OF COLLECTION: ABNORMAL
DOHLE BODIES: ABNORMAL
EKG ATRIAL RATE: 53 BPM
EKG Q-T INTERVAL: 446 MS
EKG QRS DURATION: 154 MS
EKG QTC CALCULATION (BAZETT): 452 MS
EKG R AXIS: -77 DEGREES
EKG T AXIS: 88 DEGREES
EKG VENTRICULAR RATE: 62 BPM
EOSINOPHILS ABSOLUTE: 0 E9/L (ref 0.05–0.5)
EOSINOPHILS ABSOLUTE: 0.02 E9/L (ref 0.05–0.5)
EOSINOPHILS ABSOLUTE: 0.06 E9/L (ref 0.05–0.5)
EOSINOPHILS RELATIVE PERCENT: 0 % (ref 0–6)
EOSINOPHILS RELATIVE PERCENT: 0.3 % (ref 0–6)
EOSINOPHILS RELATIVE PERCENT: 0.9 % (ref 0–6)
EPITHELIAL CELLS, UA: ABNORMAL /HPF
FERRITIN: 2269 NG/ML
FOLATE: 11.6 NG/ML (ref 4.8–24.2)
GFR AFRICAN AMERICAN: 24
GFR AFRICAN AMERICAN: 25
GFR AFRICAN AMERICAN: 39
GFR AFRICAN AMERICAN: 50
GFR AFRICAN AMERICAN: 56
GFR AFRICAN AMERICAN: >60
GFR NON-AFRICAN AMERICAN: 20 ML/MIN/1.73
GFR NON-AFRICAN AMERICAN: 21 ML/MIN/1.73
GFR NON-AFRICAN AMERICAN: 32 ML/MIN/1.73
GFR NON-AFRICAN AMERICAN: 42 ML/MIN/1.73
GFR NON-AFRICAN AMERICAN: 46 ML/MIN/1.73
GFR NON-AFRICAN AMERICAN: 51 ML/MIN/1.73
GLUCOSE BLD-MCNC: 119 MG/DL (ref 74–99)
GLUCOSE BLD-MCNC: 143 MG/DL (ref 74–99)
GLUCOSE BLD-MCNC: 184 MG/DL (ref 74–99)
GLUCOSE BLD-MCNC: 193 MG/DL (ref 74–99)
GLUCOSE BLD-MCNC: 85 MG/DL (ref 74–99)
GLUCOSE BLD-MCNC: 96 MG/DL (ref 74–99)
GLUCOSE URINE: NEGATIVE MG/DL
HCO3: 25.5 MMOL/L (ref 22–26)
HCO3: 34.1 MMOL/L (ref 22–26)
HCT VFR BLD CALC: 32.6 % (ref 34–48)
HCT VFR BLD CALC: 34.4 % (ref 34–48)
HCT VFR BLD CALC: 35.4 % (ref 34–48)
HCT VFR BLD CALC: 36.6 % (ref 34–48)
HCT VFR BLD CALC: 38.8 % (ref 34–48)
HCT VFR BLD CALC: 40 % (ref 34–48)
HEMOGLOBIN: 10.3 G/DL (ref 11.5–15.5)
HEMOGLOBIN: 10.9 G/DL (ref 11.5–15.5)
HEMOGLOBIN: 11.1 G/DL (ref 11.5–15.5)
HEMOGLOBIN: 11.6 G/DL (ref 11.5–15.5)
HEMOGLOBIN: 12.1 G/DL (ref 11.5–15.5)
HEMOGLOBIN: 12.4 G/DL (ref 11.5–15.5)
HHB: 15.7 % (ref 0–5)
HHB: 19.9 % (ref 0–5)
HYPOCHROMIA: ABNORMAL
IMMATURE GRANULOCYTES #: 0.11 E9/L
IMMATURE GRANULOCYTES %: 1.9 % (ref 0–5)
IRON SATURATION: 16 % (ref 15–50)
IRON: 17 MCG/DL (ref 37–145)
KETONES, URINE: NEGATIVE MG/DL
L. PNEUMOPHILA SEROGP 1 UR AG: ABNORMAL
L. PNEUMOPHILA SEROGP 1 UR AG: ABNORMAL
LAB: ABNORMAL
LAB: ABNORMAL
LACTIC ACID, SEPSIS: 2 MMOL/L (ref 0.5–1.9)
LACTIC ACID, SEPSIS: 2.3 MMOL/L (ref 0.5–1.9)
LACTIC ACID: 1.2 MMOL/L (ref 0.5–2.2)
LACTIC ACID: 1.6 MMOL/L (ref 0.5–2.2)
LACTIC ACID: 1.6 MMOL/L (ref 0.5–2.2)
LEUKOCYTE ESTERASE, URINE: ABNORMAL
LV EF: 65 %
LVEF MODALITY: NORMAL
LYMPHOCYTES ABSOLUTE: 0.4 E9/L (ref 1.5–4)
LYMPHOCYTES ABSOLUTE: 0.42 E9/L (ref 1.5–4)
LYMPHOCYTES ABSOLUTE: 0.59 E9/L (ref 1.5–4)
LYMPHOCYTES ABSOLUTE: 0.64 E9/L (ref 1.5–4)
LYMPHOCYTES ABSOLUTE: 0.76 E9/L (ref 1.5–4)
LYMPHOCYTES RELATIVE PERCENT: 10.3 % (ref 20–42)
LYMPHOCYTES RELATIVE PERCENT: 10.5 % (ref 20–42)
LYMPHOCYTES RELATIVE PERCENT: 3.5 % (ref 20–42)
LYMPHOCYTES RELATIVE PERCENT: 6.1 % (ref 20–42)
LYMPHOCYTES RELATIVE PERCENT: 7.8 % (ref 20–42)
Lab: ABNORMAL
Lab: ABNORMAL
MCH RBC QN AUTO: 31.1 PG (ref 26–35)
MCH RBC QN AUTO: 31.1 PG (ref 26–35)
MCH RBC QN AUTO: 31.4 PG (ref 26–35)
MCH RBC QN AUTO: 31.4 PG (ref 26–35)
MCH RBC QN AUTO: 31.6 PG (ref 26–35)
MCH RBC QN AUTO: 31.6 PG (ref 26–35)
MCHC RBC AUTO-ENTMCNC: 31 % (ref 32–34.5)
MCHC RBC AUTO-ENTMCNC: 31.2 % (ref 32–34.5)
MCHC RBC AUTO-ENTMCNC: 31.4 % (ref 32–34.5)
MCHC RBC AUTO-ENTMCNC: 31.6 % (ref 32–34.5)
MCHC RBC AUTO-ENTMCNC: 31.7 % (ref 32–34.5)
MCHC RBC AUTO-ENTMCNC: 31.7 % (ref 32–34.5)
MCV RBC AUTO: 100 FL (ref 80–99.9)
MCV RBC AUTO: 100 FL (ref 80–99.9)
MCV RBC AUTO: 101.8 FL (ref 80–99.9)
MCV RBC AUTO: 98.3 FL (ref 80–99.9)
MCV RBC AUTO: 98.9 FL (ref 80–99.9)
MCV RBC AUTO: 99.7 FL (ref 80–99.9)
METAMYELOCYTES RELATIVE PERCENT: 1.7 % (ref 0–1)
METAMYELOCYTES RELATIVE PERCENT: 1.7 % (ref 0–1)
METAMYELOCYTES RELATIVE PERCENT: 1.8 % (ref 0–1)
METHB: 0.1 % (ref 0–1.5)
METHB: 0.2 % (ref 0–1.5)
MODE: ABNORMAL
MODE: ABNORMAL
MONOCYTES ABSOLUTE: 0.24 E9/L (ref 0.1–0.95)
MONOCYTES ABSOLUTE: 0.26 E9/L (ref 0.1–0.95)
MONOCYTES ABSOLUTE: 0.31 E9/L (ref 0.1–0.95)
MONOCYTES ABSOLUTE: 0.33 E9/L (ref 0.1–0.95)
MONOCYTES ABSOLUTE: 0.41 E9/L (ref 0.1–0.95)
MONOCYTES RELATIVE PERCENT: 2.6 % (ref 2–12)
MONOCYTES RELATIVE PERCENT: 2.6 % (ref 2–12)
MONOCYTES RELATIVE PERCENT: 4.3 % (ref 2–12)
MONOCYTES RELATIVE PERCENT: 5.8 % (ref 2–12)
MONOCYTES RELATIVE PERCENT: 6.1 % (ref 2–12)
MRSA CULTURE ONLY: NORMAL
MYELOCYTE PERCENT: 0.9 % (ref 0–0)
NEUTROPHILS ABSOLUTE: 4.64 E9/L (ref 1.8–7.3)
NEUTROPHILS ABSOLUTE: 5.73 E9/L (ref 1.8–7.3)
NEUTROPHILS ABSOLUTE: 5.87 E9/L (ref 1.8–7.3)
NEUTROPHILS ABSOLUTE: 7.2 E9/L (ref 1.8–7.3)
NEUTROPHILS ABSOLUTE: 9.78 E9/L (ref 1.8–7.3)
NEUTROPHILS RELATIVE PERCENT: 81.2 % (ref 43–80)
NEUTROPHILS RELATIVE PERCENT: 81.6 % (ref 43–80)
NEUTROPHILS RELATIVE PERCENT: 86.1 % (ref 43–80)
NEUTROPHILS RELATIVE PERCENT: 89.6 % (ref 43–80)
NEUTROPHILS RELATIVE PERCENT: 92.2 % (ref 43–80)
NITRITE, URINE: NEGATIVE
NUCLEATED RED BLOOD CELLS: 0 /100 WBC
NUCLEATED RED BLOOD CELLS: 1.7 /100 WBC
O2 CONTENT: 13.5 ML/DL
O2 CONTENT: 14.2 ML/DL
O2 SATURATION: 80 % (ref 92–98.5)
O2 SATURATION: 84.2 % (ref 92–98.5)
O2HB: 79.5 % (ref 94–97)
O2HB: 83.5 % (ref 94–97)
OPERATOR ID: 3342
OPERATOR ID: 46
ORGANISM: ABNORMAL
OVALOCYTES: ABNORMAL
PATIENT TEMP: 37 C
PATIENT TEMP: 37 C
PCO2: 38.6 MMHG (ref 35–45)
PCO2: 52.5 MMHG (ref 35–45)
PDW BLD-RTO: 14.6 FL (ref 11.5–15)
PDW BLD-RTO: 14.6 FL (ref 11.5–15)
PDW BLD-RTO: 14.7 FL (ref 11.5–15)
PDW BLD-RTO: 14.9 FL (ref 11.5–15)
PH BLOOD GAS: 7.43 (ref 7.35–7.45)
PH BLOOD GAS: 7.44 (ref 7.35–7.45)
PH UA: 5 (ref 5–9)
PHOSPHORUS: 2.2 MG/DL (ref 2.5–4.5)
PLATELET # BLD: 164 E9/L (ref 130–450)
PLATELET # BLD: 187 E9/L (ref 130–450)
PLATELET # BLD: 197 E9/L (ref 130–450)
PLATELET # BLD: 202 E9/L (ref 130–450)
PLATELET # BLD: 215 E9/L (ref 130–450)
PLATELET # BLD: 220 E9/L (ref 130–450)
PMV BLD AUTO: 12.1 FL (ref 7–12)
PMV BLD AUTO: 12.6 FL (ref 7–12)
PMV BLD AUTO: 12.8 FL (ref 7–12)
PMV BLD AUTO: 13 FL (ref 7–12)
PMV BLD AUTO: 13.1 FL (ref 7–12)
PMV BLD AUTO: 13.3 FL (ref 7–12)
PO2: 43.4 MMHG (ref 75–100)
PO2: 47.1 MMHG (ref 75–100)
POIKILOCYTES: ABNORMAL
POLYCHROMASIA: ABNORMAL
POTASSIUM REFLEX MAGNESIUM: 4.2 MMOL/L (ref 3.5–5)
POTASSIUM REFLEX MAGNESIUM: 4.8 MMOL/L (ref 3.5–5)
POTASSIUM SERPL-SCNC: 3.7 MMOL/L (ref 3.5–5)
POTASSIUM SERPL-SCNC: 4.2 MMOL/L (ref 3.5–5)
POTASSIUM SERPL-SCNC: 4.4 MMOL/L (ref 3.5–5)
POTASSIUM SERPL-SCNC: 4.7 MMOL/L (ref 3.5–5)
POTASSIUM, UR: 79 MMOL/L
PRO-BNP: ABNORMAL PG/ML (ref 0–450)
PROCALCITONIN: 39.37 NG/ML (ref 0–0.08)
PROTEIN PROTEIN: 64 MG/DL (ref 0–12)
PROTEIN UA: 30 MG/DL
RBC # BLD: 3.26 E12/L (ref 3.5–5.5)
RBC # BLD: 3.5 E12/L (ref 3.5–5.5)
RBC # BLD: 3.54 E12/L (ref 3.5–5.5)
RBC # BLD: 3.7 E12/L (ref 3.5–5.5)
RBC # BLD: 3.89 E12/L (ref 3.5–5.5)
RBC # BLD: 3.93 E12/L (ref 3.5–5.5)
RBC UA: ABNORMAL /HPF (ref 0–2)
REASON FOR REJECTION: NORMAL
REJECTED TEST: NORMAL
SARS-COV-2, NAAT: NOT DETECTED
SCHISTOCYTES: ABNORMAL
SODIUM BLD-SCNC: 139 MMOL/L (ref 132–146)
SODIUM BLD-SCNC: 140 MMOL/L (ref 132–146)
SODIUM BLD-SCNC: 140 MMOL/L (ref 132–146)
SODIUM BLD-SCNC: 141 MMOL/L (ref 132–146)
SODIUM BLD-SCNC: 142 MMOL/L (ref 132–146)
SODIUM BLD-SCNC: 145 MMOL/L (ref 132–146)
SODIUM URINE: <20 MMOL/L
SOURCE, BLOOD GAS: ABNORMAL
SOURCE, BLOOD GAS: ABNORMAL
SPECIFIC GRAVITY UA: >=1.03 (ref 1–1.03)
STREP PNEUMONIAE ANTIGEN, URINE: NORMAL
TEAR DROP CELLS: ABNORMAL
THB: 12.1 G/DL (ref 11.5–16.5)
THB: 12.1 G/DL (ref 11.5–16.5)
TIME ANALYZED: 1317
TIME ANALYZED: 1659
TOTAL IRON BINDING CAPACITY: 107 MCG/DL (ref 250–450)
TOTAL PROTEIN: 5.8 G/DL (ref 6.4–8.3)
TOTAL PROTEIN: 5.9 G/DL (ref 6.4–8.3)
TOTAL PROTEIN: 6.1 G/DL (ref 6.4–8.3)
TOTAL PROTEIN: 6.4 G/DL (ref 6.4–8.3)
TOTAL PROTEIN: 6.7 G/DL (ref 6.4–8.3)
TOTAL PROTEIN: 6.8 G/DL (ref 6.4–8.3)
TOXIC GRANULATION: ABNORMAL
TOXIC GRANULATION: ABNORMAL
TROPONIN, HIGH SENSITIVITY: 143 NG/L (ref 0–9)
TROPONIN, HIGH SENSITIVITY: 175 NG/L (ref 0–9)
TROPONIN, HIGH SENSITIVITY: 259 NG/L (ref 0–9)
TROPONIN, HIGH SENSITIVITY: 265 NG/L (ref 0–9)
TSH SERPL DL<=0.05 MIU/L-ACNC: 0.5 UIU/ML (ref 0.27–4.2)
URINE CULTURE, ROUTINE: ABNORMAL
URINE CULTURE, ROUTINE: ABNORMAL
UROBILINOGEN, URINE: 1 E.U./DL
VACUOLATED NEUTROPHILS: ABNORMAL
VITAMIN B-12: 733 PG/ML (ref 211–946)
WBC # BLD: 10.4 E9/L (ref 4.5–11.5)
WBC # BLD: 5.7 E9/L (ref 4.5–11.5)
WBC # BLD: 6.6 E9/L (ref 4.5–11.5)
WBC # BLD: 6.9 E9/L (ref 4.5–11.5)
WBC # BLD: 8 E9/L (ref 4.5–11.5)
WBC # BLD: 9.3 E9/L (ref 4.5–11.5)
WBC UA: ABNORMAL /HPF (ref 0–5)

## 2021-01-01 PROCEDURE — 2580000003 HC RX 258: Performed by: NURSE PRACTITIONER

## 2021-01-01 PROCEDURE — 6360000002 HC RX W HCPCS: Performed by: INTERNAL MEDICINE

## 2021-01-01 PROCEDURE — 6360000002 HC RX W HCPCS: Performed by: NURSE PRACTITIONER

## 2021-01-01 PROCEDURE — 85025 COMPLETE CBC W/AUTO DIFF WBC: CPT

## 2021-01-01 PROCEDURE — 84443 ASSAY THYROID STIM HORMONE: CPT

## 2021-01-01 PROCEDURE — 2580000003 HC RX 258: Performed by: INTERNAL MEDICINE

## 2021-01-01 PROCEDURE — 99213 OFFICE O/P EST LOW 20 MIN: CPT | Performed by: PODIATRIST

## 2021-01-01 PROCEDURE — 6370000000 HC RX 637 (ALT 250 FOR IP): Performed by: NURSE PRACTITIONER

## 2021-01-01 PROCEDURE — 6360000002 HC RX W HCPCS: Performed by: STUDENT IN AN ORGANIZED HEALTH CARE EDUCATION/TRAINING PROGRAM

## 2021-01-01 PROCEDURE — 91300 COVID-19, PFIZER VACCINE 30MCG/0.3ML DOSE: CPT | Performed by: NURSE PRACTITIONER

## 2021-01-01 PROCEDURE — 94640 AIRWAY INHALATION TREATMENT: CPT

## 2021-01-01 PROCEDURE — 87088 URINE BACTERIA CULTURE: CPT

## 2021-01-01 PROCEDURE — 93306 TTE W/DOPPLER COMPLETE: CPT

## 2021-01-01 PROCEDURE — 80053 COMPREHEN METABOLIC PANEL: CPT

## 2021-01-01 PROCEDURE — 36415 COLL VENOUS BLD VENIPUNCTURE: CPT

## 2021-01-01 PROCEDURE — 74176 CT ABD & PELVIS W/O CONTRAST: CPT

## 2021-01-01 PROCEDURE — 6370000000 HC RX 637 (ALT 250 FOR IP): Performed by: INTERNAL MEDICINE

## 2021-01-01 PROCEDURE — 94660 CPAP INITIATION&MGMT: CPT

## 2021-01-01 PROCEDURE — 6370000000 HC RX 637 (ALT 250 FOR IP): Performed by: STUDENT IN AN ORGANIZED HEALTH CARE EDUCATION/TRAINING PROGRAM

## 2021-01-01 PROCEDURE — 84133 ASSAY OF URINE POTASSIUM: CPT

## 2021-01-01 PROCEDURE — 71045 X-RAY EXAM CHEST 1 VIEW: CPT

## 2021-01-01 PROCEDURE — 99232 SBSQ HOSP IP/OBS MODERATE 35: CPT | Performed by: NURSE PRACTITIONER

## 2021-01-01 PROCEDURE — 83540 ASSAY OF IRON: CPT

## 2021-01-01 PROCEDURE — 82570 ASSAY OF URINE CREATININE: CPT

## 2021-01-01 PROCEDURE — 83605 ASSAY OF LACTIC ACID: CPT

## 2021-01-01 PROCEDURE — 2700000000 HC OXYGEN THERAPY PER DAY

## 2021-01-01 PROCEDURE — 82805 BLOOD GASES W/O2 SATURATION: CPT

## 2021-01-01 PROCEDURE — 97165 OT EVAL LOW COMPLEX 30 MIN: CPT

## 2021-01-01 PROCEDURE — 84100 ASSAY OF PHOSPHORUS: CPT

## 2021-01-01 PROCEDURE — 0001A COVID-19, PFIZER VACCINE 30MCG/0.3ML DOSE: CPT | Performed by: PHYSICIAN ASSISTANT

## 2021-01-01 PROCEDURE — 85027 COMPLETE CBC AUTOMATED: CPT

## 2021-01-01 PROCEDURE — 83880 ASSAY OF NATRIURETIC PEPTIDE: CPT

## 2021-01-01 PROCEDURE — 2580000003 HC RX 258: Performed by: SPECIALIST

## 2021-01-01 PROCEDURE — 2060000000 HC ICU INTERMEDIATE R&B

## 2021-01-01 PROCEDURE — 2580000003 HC RX 258: Performed by: STUDENT IN AN ORGANIZED HEALTH CARE EDUCATION/TRAINING PROGRAM

## 2021-01-01 PROCEDURE — 1036F TOBACCO NON-USER: CPT | Performed by: PODIATRIST

## 2021-01-01 PROCEDURE — 0002A COVID-19, PFIZER VACCINE 30MCG/0.3ML DOSE: CPT | Performed by: NURSE PRACTITIONER

## 2021-01-01 PROCEDURE — G8417 CALC BMI ABV UP PARAM F/U: HCPCS | Performed by: PODIATRIST

## 2021-01-01 PROCEDURE — 2500000003 HC RX 250 WO HCPCS: Performed by: NURSE PRACTITIONER

## 2021-01-01 PROCEDURE — 6360000002 HC RX W HCPCS: Performed by: SPECIALIST

## 2021-01-01 PROCEDURE — 1090F PRES/ABSN URINE INCON ASSESS: CPT | Performed by: PODIATRIST

## 2021-01-01 PROCEDURE — 6370000000 HC RX 637 (ALT 250 FOR IP): Performed by: FAMILY MEDICINE

## 2021-01-01 PROCEDURE — 83550 IRON BINDING TEST: CPT

## 2021-01-01 PROCEDURE — 84484 ASSAY OF TROPONIN QUANT: CPT

## 2021-01-01 PROCEDURE — 81001 URINALYSIS AUTO W/SCOPE: CPT

## 2021-01-01 PROCEDURE — APPSS60 APP SPLIT SHARED TIME 46-60 MINUTES: Performed by: CLINICAL NURSE SPECIALIST

## 2021-01-01 PROCEDURE — 94664 DEMO&/EVAL PT USE INHALER: CPT

## 2021-01-01 PROCEDURE — G8427 DOCREV CUR MEDS BY ELIG CLIN: HCPCS | Performed by: PODIATRIST

## 2021-01-01 PROCEDURE — 84300 ASSAY OF URINE SODIUM: CPT

## 2021-01-01 PROCEDURE — 87040 BLOOD CULTURE FOR BACTERIA: CPT

## 2021-01-01 PROCEDURE — 71250 CT THORAX DX C-: CPT

## 2021-01-01 PROCEDURE — 99233 SBSQ HOSP IP/OBS HIGH 50: CPT | Performed by: INTERNAL MEDICINE

## 2021-01-01 PROCEDURE — 87186 SC STD MICRODIL/AGAR DIL: CPT

## 2021-01-01 PROCEDURE — 93010 ELECTROCARDIOGRAM REPORT: CPT | Performed by: INTERNAL MEDICINE

## 2021-01-01 PROCEDURE — 36600 WITHDRAWAL OF ARTERIAL BLOOD: CPT

## 2021-01-01 PROCEDURE — 82746 ASSAY OF FOLIC ACID SERUM: CPT

## 2021-01-01 PROCEDURE — 4040F PNEUMOC VAC/ADMIN/RCVD: CPT | Performed by: PODIATRIST

## 2021-01-01 PROCEDURE — 92526 ORAL FUNCTION THERAPY: CPT | Performed by: SPEECH-LANGUAGE PATHOLOGIST

## 2021-01-01 PROCEDURE — 76770 US EXAM ABDO BACK WALL COMP: CPT

## 2021-01-01 PROCEDURE — 87081 CULTURE SCREEN ONLY: CPT

## 2021-01-01 PROCEDURE — 82728 ASSAY OF FERRITIN: CPT

## 2021-01-01 PROCEDURE — 99223 1ST HOSP IP/OBS HIGH 75: CPT | Performed by: INTERNAL MEDICINE

## 2021-01-01 PROCEDURE — 11721 DEBRIDE NAIL 6 OR MORE: CPT | Performed by: PODIATRIST

## 2021-01-01 PROCEDURE — 99222 1ST HOSP IP/OBS MODERATE 55: CPT | Performed by: INTERNAL MEDICINE

## 2021-01-01 PROCEDURE — 2500000003 HC RX 250 WO HCPCS: Performed by: STUDENT IN AN ORGANIZED HEALTH CARE EDUCATION/TRAINING PROGRAM

## 2021-01-01 PROCEDURE — 1123F ACP DISCUSS/DSCN MKR DOCD: CPT | Performed by: PODIATRIST

## 2021-01-01 PROCEDURE — 84156 ASSAY OF PROTEIN URINE: CPT

## 2021-01-01 PROCEDURE — 91300 COVID-19, PFIZER VACCINE 30MCG/0.3ML DOSE: CPT | Performed by: PHYSICIAN ASSISTANT

## 2021-01-01 PROCEDURE — 96375 TX/PRO/DX INJ NEW DRUG ADDON: CPT

## 2021-01-01 PROCEDURE — 99285 EMERGENCY DEPT VISIT HI MDM: CPT

## 2021-01-01 PROCEDURE — 92610 EVALUATE SWALLOWING FUNCTION: CPT | Performed by: SPEECH-LANGUAGE PATHOLOGIST

## 2021-01-01 PROCEDURE — 87449 NOS EACH ORGANISM AG IA: CPT

## 2021-01-01 PROCEDURE — 82607 VITAMIN B-12: CPT

## 2021-01-01 PROCEDURE — 87077 CULTURE AEROBIC IDENTIFY: CPT

## 2021-01-01 PROCEDURE — 84145 PROCALCITONIN (PCT): CPT

## 2021-01-01 PROCEDURE — 99223 1ST HOSP IP/OBS HIGH 75: CPT | Performed by: NURSE PRACTITIONER

## 2021-01-01 PROCEDURE — 87635 SARS-COV-2 COVID-19 AMP PRB: CPT

## 2021-01-01 PROCEDURE — 97161 PT EVAL LOW COMPLEX 20 MIN: CPT

## 2021-01-01 PROCEDURE — 97530 THERAPEUTIC ACTIVITIES: CPT

## 2021-01-01 PROCEDURE — 82436 ASSAY OF URINE CHLORIDE: CPT

## 2021-01-01 PROCEDURE — 51702 INSERT TEMP BLADDER CATH: CPT

## 2021-01-01 PROCEDURE — 93005 ELECTROCARDIOGRAM TRACING: CPT | Performed by: EMERGENCY MEDICINE

## 2021-01-01 PROCEDURE — 82140 ASSAY OF AMMONIA: CPT

## 2021-01-01 PROCEDURE — 96374 THER/PROPH/DIAG INJ IV PUSH: CPT

## 2021-01-01 RX ORDER — DOCUSATE SODIUM 100 MG/1
100 CAPSULE, LIQUID FILLED ORAL 2 TIMES DAILY
COMMUNITY

## 2021-01-01 RX ORDER — SODIUM CHLORIDE 9 MG/ML
INJECTION, SOLUTION INTRAVENOUS EVERY 12 HOURS
Status: DISCONTINUED | OUTPATIENT
Start: 2021-01-01 | End: 2021-01-01 | Stop reason: ALTCHOICE

## 2021-01-01 RX ORDER — ALBUTEROL SULFATE 2.5 MG/3ML
2.5 SOLUTION RESPIRATORY (INHALATION) EVERY 6 HOURS
Status: DISCONTINUED | OUTPATIENT
Start: 2021-01-01 | End: 2021-01-01 | Stop reason: HOSPADM

## 2021-01-01 RX ORDER — SODIUM CHLORIDE 9 MG/ML
25 INJECTION, SOLUTION INTRAVENOUS PRN
Status: DISCONTINUED | OUTPATIENT
Start: 2021-01-01 | End: 2021-01-01 | Stop reason: HOSPADM

## 2021-01-01 RX ORDER — TRAMADOL HYDROCHLORIDE 50 MG/1
50 TABLET ORAL NIGHTLY
COMMUNITY

## 2021-01-01 RX ORDER — LORAZEPAM 2 MG/ML
1 INJECTION INTRAMUSCULAR EVERY 6 HOURS PRN
Status: DISCONTINUED | OUTPATIENT
Start: 2021-01-01 | End: 2021-01-01 | Stop reason: HOSPADM

## 2021-01-01 RX ORDER — HEPARIN SODIUM 10000 [USP'U]/ML
5000 INJECTION, SOLUTION INTRAVENOUS; SUBCUTANEOUS EVERY 8 HOURS SCHEDULED
Status: DISCONTINUED | OUTPATIENT
Start: 2021-01-01 | End: 2021-01-01 | Stop reason: HOSPADM

## 2021-01-01 RX ORDER — ASPIRIN 81 MG/1
81 TABLET ORAL DAILY
Status: DISCONTINUED | OUTPATIENT
Start: 2021-01-01 | End: 2021-01-01 | Stop reason: HOSPADM

## 2021-01-01 RX ORDER — MORPHINE SULFATE 2 MG/ML
2 INJECTION, SOLUTION INTRAMUSCULAR; INTRAVENOUS
Status: DISCONTINUED | OUTPATIENT
Start: 2021-01-01 | End: 2021-01-01

## 2021-01-01 RX ORDER — DONEPEZIL HYDROCHLORIDE 5 MG/1
5 TABLET, FILM COATED ORAL DAILY
Status: DISCONTINUED | OUTPATIENT
Start: 2021-01-01 | End: 2021-01-01 | Stop reason: HOSPADM

## 2021-01-01 RX ORDER — PANTOPRAZOLE SODIUM 40 MG/1
40 TABLET, DELAYED RELEASE ORAL
Status: DISCONTINUED | OUTPATIENT
Start: 2021-01-01 | End: 2021-01-01 | Stop reason: HOSPADM

## 2021-01-01 RX ORDER — ISOSORBIDE MONONITRATE 30 MG/1
30 TABLET, EXTENDED RELEASE ORAL DAILY
Status: DISCONTINUED | OUTPATIENT
Start: 2021-01-01 | End: 2021-01-01 | Stop reason: HOSPADM

## 2021-01-01 RX ORDER — METOPROLOL TARTRATE 50 MG/1
100 TABLET, FILM COATED ORAL 2 TIMES DAILY
Status: DISCONTINUED | OUTPATIENT
Start: 2021-01-01 | End: 2021-01-01 | Stop reason: HOSPADM

## 2021-01-01 RX ORDER — MORPHINE SULFATE 2 MG/ML
2 INJECTION, SOLUTION INTRAMUSCULAR; INTRAVENOUS EVERY 30 MIN PRN
Status: DISCONTINUED | OUTPATIENT
Start: 2021-01-01 | End: 2021-01-01 | Stop reason: HOSPADM

## 2021-01-01 RX ORDER — SODIUM CHLORIDE 0.9 % (FLUSH) 0.9 %
5-40 SYRINGE (ML) INJECTION EVERY 12 HOURS SCHEDULED
Status: DISCONTINUED | OUTPATIENT
Start: 2021-01-01 | End: 2021-01-01 | Stop reason: HOSPADM

## 2021-01-01 RX ORDER — METHYLPREDNISOLONE SODIUM SUCCINATE 125 MG/2ML
125 INJECTION, POWDER, LYOPHILIZED, FOR SOLUTION INTRAMUSCULAR; INTRAVENOUS ONCE
Status: COMPLETED | OUTPATIENT
Start: 2021-01-01 | End: 2021-01-01

## 2021-01-01 RX ORDER — LEVOFLOXACIN 5 MG/ML
500 INJECTION, SOLUTION INTRAVENOUS ONCE
Status: DISCONTINUED | OUTPATIENT
Start: 2021-01-01 | End: 2021-01-01

## 2021-01-01 RX ORDER — IPRATROPIUM BROMIDE AND ALBUTEROL SULFATE 2.5; .5 MG/3ML; MG/3ML
3 SOLUTION RESPIRATORY (INHALATION) ONCE
Status: COMPLETED | OUTPATIENT
Start: 2021-01-01 | End: 2021-01-01

## 2021-01-01 RX ORDER — MIRTAZAPINE 15 MG/1
15 TABLET, FILM COATED ORAL NIGHTLY
COMMUNITY

## 2021-01-01 RX ORDER — FUROSEMIDE 10 MG/ML
20 INJECTION INTRAMUSCULAR; INTRAVENOUS ONCE
Status: COMPLETED | OUTPATIENT
Start: 2021-01-01 | End: 2021-01-01

## 2021-01-01 RX ORDER — METHYLPREDNISOLONE SODIUM SUCCINATE 40 MG/ML
40 INJECTION, POWDER, LYOPHILIZED, FOR SOLUTION INTRAMUSCULAR; INTRAVENOUS ONCE
Status: DISCONTINUED | OUTPATIENT
Start: 2021-01-01 | End: 2021-01-01

## 2021-01-01 RX ORDER — LEVOFLOXACIN 5 MG/ML
250 INJECTION, SOLUTION INTRAVENOUS
Status: DISCONTINUED | OUTPATIENT
Start: 2021-01-01 | End: 2021-01-01

## 2021-01-01 RX ORDER — SODIUM CHLORIDE 0.9 % (FLUSH) 0.9 %
5-40 SYRINGE (ML) INJECTION PRN
Status: DISCONTINUED | OUTPATIENT
Start: 2021-01-01 | End: 2021-01-01 | Stop reason: HOSPADM

## 2021-01-01 RX ORDER — POLYETHYLENE GLYCOL 3350 17 G/17G
17 POWDER, FOR SOLUTION ORAL DAILY PRN
Status: DISCONTINUED | OUTPATIENT
Start: 2021-01-01 | End: 2021-01-01 | Stop reason: HOSPADM

## 2021-01-01 RX ORDER — LEVOFLOXACIN 5 MG/ML
750 INJECTION, SOLUTION INTRAVENOUS
Status: DISCONTINUED | OUTPATIENT
Start: 2021-01-01 | End: 2021-01-01 | Stop reason: HOSPADM

## 2021-01-01 RX ORDER — CLONIDINE HYDROCHLORIDE 0.1 MG/1
0.1 TABLET ORAL 2 TIMES DAILY
Status: DISCONTINUED | OUTPATIENT
Start: 2021-01-01 | End: 2021-01-01

## 2021-01-01 RX ORDER — SODIUM CHLORIDE, SODIUM LACTATE, POTASSIUM CHLORIDE, CALCIUM CHLORIDE 600; 310; 30; 20 MG/100ML; MG/100ML; MG/100ML; MG/100ML
INJECTION, SOLUTION INTRAVENOUS CONTINUOUS
Status: DISCONTINUED | OUTPATIENT
Start: 2021-01-01 | End: 2021-01-01

## 2021-01-01 RX ORDER — DEXTROSE MONOHYDRATE 50 MG/ML
INJECTION, SOLUTION INTRAVENOUS CONTINUOUS
Status: DISCONTINUED | OUTPATIENT
Start: 2021-01-01 | End: 2021-01-01 | Stop reason: HOSPADM

## 2021-01-01 RX ORDER — ACETAMINOPHEN 325 MG/1
650 TABLET ORAL EVERY 6 HOURS PRN
Status: DISCONTINUED | OUTPATIENT
Start: 2021-01-01 | End: 2021-01-01 | Stop reason: HOSPADM

## 2021-01-01 RX ORDER — MEMANTINE HYDROCHLORIDE 10 MG/1
10 TABLET ORAL 2 TIMES DAILY
COMMUNITY

## 2021-01-01 RX ORDER — GABAPENTIN 100 MG/1
100 CAPSULE ORAL NIGHTLY
Status: DISCONTINUED | OUTPATIENT
Start: 2021-01-01 | End: 2021-01-01 | Stop reason: HOSPADM

## 2021-01-01 RX ORDER — LEVOTHYROXINE SODIUM 0.05 MG/1
50 TABLET ORAL NIGHTLY
Status: DISCONTINUED | OUTPATIENT
Start: 2021-01-01 | End: 2021-01-01 | Stop reason: HOSPADM

## 2021-01-01 RX ORDER — ALBUTEROL SULFATE 90 UG/1
2 AEROSOL, METERED RESPIRATORY (INHALATION) EVERY 6 HOURS
Status: DISCONTINUED | OUTPATIENT
Start: 2021-01-01 | End: 2021-01-01 | Stop reason: CLARIF

## 2021-01-01 RX ORDER — DIPHENHYDRAMINE HYDROCHLORIDE 50 MG/ML
25 INJECTION INTRAMUSCULAR; INTRAVENOUS EVERY 6 HOURS PRN
Status: DISCONTINUED | OUTPATIENT
Start: 2021-01-01 | End: 2021-01-01 | Stop reason: HOSPADM

## 2021-01-01 RX ORDER — BUMETANIDE 0.25 MG/ML
1 INJECTION, SOLUTION INTRAMUSCULAR; INTRAVENOUS 2 TIMES DAILY
Status: DISCONTINUED | OUTPATIENT
Start: 2021-01-01 | End: 2021-01-01 | Stop reason: HOSPADM

## 2021-01-01 RX ADMIN — BUMETANIDE 1 MG: 0.25 INJECTION, SOLUTION INTRAMUSCULAR; INTRAVENOUS at 09:10

## 2021-01-01 RX ADMIN — ALBUTEROL SULFATE 2 PUFF: 90 AEROSOL, METERED RESPIRATORY (INHALATION) at 08:59

## 2021-01-01 RX ADMIN — DOXYCYCLINE 100 MG: 100 INJECTION, POWDER, LYOPHILIZED, FOR SOLUTION INTRAVENOUS at 18:05

## 2021-01-01 RX ADMIN — LEVOTHYROXINE SODIUM 50 MCG: 0.05 TABLET ORAL at 20:06

## 2021-01-01 RX ADMIN — BUMETANIDE 1 MG: 0.25 INJECTION, SOLUTION INTRAMUSCULAR; INTRAVENOUS at 11:58

## 2021-01-01 RX ADMIN — HEPARIN SODIUM 5000 UNITS: 10000 INJECTION INTRAVENOUS; SUBCUTANEOUS at 06:30

## 2021-01-01 RX ADMIN — ISOSORBIDE MONONITRATE 30 MG: 30 TABLET, EXTENDED RELEASE ORAL at 09:45

## 2021-01-01 RX ADMIN — HEPARIN SODIUM 5000 UNITS: 10000 INJECTION INTRAVENOUS; SUBCUTANEOUS at 21:00

## 2021-01-01 RX ADMIN — DEXTROSE MONOHYDRATE: 50 INJECTION, SOLUTION INTRAVENOUS at 13:49

## 2021-01-01 RX ADMIN — HEPARIN SODIUM 5000 UNITS: 10000 INJECTION INTRAVENOUS; SUBCUTANEOUS at 14:11

## 2021-01-01 RX ADMIN — ALBUTEROL SULFATE 2.5 MG: 2.5 SOLUTION RESPIRATORY (INHALATION) at 13:14

## 2021-01-01 RX ADMIN — MEROPENEM 500 MG: 500 INJECTION INTRAVENOUS at 21:21

## 2021-01-01 RX ADMIN — DONEPEZIL HYDROCHLORIDE 5 MG: 5 TABLET, FILM COATED ORAL at 08:10

## 2021-01-01 RX ADMIN — LEVOTHYROXINE SODIUM 50 MCG: 0.05 TABLET ORAL at 21:21

## 2021-01-01 RX ADMIN — BUMETANIDE 1 MG: 0.25 INJECTION, SOLUTION INTRAMUSCULAR; INTRAVENOUS at 21:00

## 2021-01-01 RX ADMIN — MEROPENEM 500 MG: 500 INJECTION INTRAVENOUS at 17:09

## 2021-01-01 RX ADMIN — HEPARIN SODIUM 5000 UNITS: 10000 INJECTION INTRAVENOUS; SUBCUTANEOUS at 21:31

## 2021-01-01 RX ADMIN — PANTOPRAZOLE SODIUM 40 MG: 40 TABLET, DELAYED RELEASE ORAL at 15:07

## 2021-01-01 RX ADMIN — Medication 10 ML: at 10:22

## 2021-01-01 RX ADMIN — ASPIRIN 81 MG: 81 TABLET, COATED ORAL at 09:10

## 2021-01-01 RX ADMIN — HEPARIN SODIUM 5000 UNITS: 10000 INJECTION INTRAVENOUS; SUBCUTANEOUS at 14:34

## 2021-01-01 RX ADMIN — BUMETANIDE 1 MG: 0.25 INJECTION, SOLUTION INTRAMUSCULAR; INTRAVENOUS at 20:13

## 2021-01-01 RX ADMIN — ALBUTEROL SULFATE 2.5 MG: 2.5 SOLUTION RESPIRATORY (INHALATION) at 01:15

## 2021-01-01 RX ADMIN — GABAPENTIN 100 MG: 100 CAPSULE ORAL at 21:21

## 2021-01-01 RX ADMIN — LEVOTHYROXINE SODIUM 50 MCG: 0.05 TABLET ORAL at 20:13

## 2021-01-01 RX ADMIN — LEVOFLOXACIN 750 MG: 5 INJECTION, SOLUTION INTRAVENOUS at 20:12

## 2021-01-01 RX ADMIN — ALBUTEROL SULFATE 2.5 MG: 2.5 SOLUTION RESPIRATORY (INHALATION) at 08:06

## 2021-01-01 RX ADMIN — CEFEPIME HYDROCHLORIDE 2000 MG: 2 INJECTION, POWDER, FOR SOLUTION INTRAVENOUS at 17:33

## 2021-01-01 RX ADMIN — MEROPENEM 500 MG: 500 INJECTION INTRAVENOUS at 09:45

## 2021-01-01 RX ADMIN — METOPROLOL TARTRATE 100 MG: 50 TABLET, FILM COATED ORAL at 09:45

## 2021-01-01 RX ADMIN — Medication 10 ML: at 23:23

## 2021-01-01 RX ADMIN — ISOSORBIDE MONONITRATE 30 MG: 30 TABLET, EXTENDED RELEASE ORAL at 10:32

## 2021-01-01 RX ADMIN — ISOSORBIDE MONONITRATE 30 MG: 30 TABLET, EXTENDED RELEASE ORAL at 08:10

## 2021-01-01 RX ADMIN — BUMETANIDE 1 MG: 0.25 INJECTION, SOLUTION INTRAMUSCULAR; INTRAVENOUS at 20:17

## 2021-01-01 RX ADMIN — HEPARIN SODIUM 5000 UNITS: 10000 INJECTION INTRAVENOUS; SUBCUTANEOUS at 23:13

## 2021-01-01 RX ADMIN — HEPARIN SODIUM 5000 UNITS: 10000 INJECTION INTRAVENOUS; SUBCUTANEOUS at 21:46

## 2021-01-01 RX ADMIN — Medication 10 ML: at 09:45

## 2021-01-01 RX ADMIN — HEPARIN SODIUM 5000 UNITS: 10000 INJECTION INTRAVENOUS; SUBCUTANEOUS at 21:21

## 2021-01-01 RX ADMIN — DONEPEZIL HYDROCHLORIDE 5 MG: 5 TABLET, FILM COATED ORAL at 08:25

## 2021-01-01 RX ADMIN — AZITHROMYCIN 500 MG: 500 INJECTION, POWDER, LYOPHILIZED, FOR SOLUTION INTRAVENOUS at 17:11

## 2021-01-01 RX ADMIN — METHYLPREDNISOLONE SODIUM SUCCINATE 125 MG: 125 INJECTION, POWDER, FOR SOLUTION INTRAMUSCULAR; INTRAVENOUS at 13:59

## 2021-01-01 RX ADMIN — SODIUM CHLORIDE: 9 INJECTION, SOLUTION INTRAVENOUS at 20:05

## 2021-01-01 RX ADMIN — METOPROLOL TARTRATE 100 MG: 50 TABLET, FILM COATED ORAL at 09:10

## 2021-01-01 RX ADMIN — PANTOPRAZOLE SODIUM 40 MG: 40 TABLET, DELAYED RELEASE ORAL at 05:01

## 2021-01-01 RX ADMIN — MORPHINE SULFATE 2 MG: 2 INJECTION, SOLUTION INTRAMUSCULAR; INTRAVENOUS at 11:35

## 2021-01-01 RX ADMIN — DEXTROSE MONOHYDRATE: 50 INJECTION, SOLUTION INTRAVENOUS at 06:47

## 2021-01-01 RX ADMIN — AZITHROMYCIN 500 MG: 500 INJECTION, POWDER, LYOPHILIZED, FOR SOLUTION INTRAVENOUS at 17:57

## 2021-01-01 RX ADMIN — SODIUM CHLORIDE, POTASSIUM CHLORIDE, SODIUM LACTATE AND CALCIUM CHLORIDE: 600; 310; 30; 20 INJECTION, SOLUTION INTRAVENOUS at 15:22

## 2021-01-01 RX ADMIN — PANTOPRAZOLE SODIUM 40 MG: 40 TABLET, DELAYED RELEASE ORAL at 06:04

## 2021-01-01 RX ADMIN — MORPHINE SULFATE 2 MG: 2 INJECTION, SOLUTION INTRAMUSCULAR; INTRAVENOUS at 12:44

## 2021-01-01 RX ADMIN — LORAZEPAM 1 MG: 2 INJECTION INTRAMUSCULAR; INTRAVENOUS at 12:01

## 2021-01-01 RX ADMIN — GABAPENTIN 100 MG: 100 CAPSULE ORAL at 20:06

## 2021-01-01 RX ADMIN — ASPIRIN 81 MG: 81 TABLET, COATED ORAL at 09:45

## 2021-01-01 RX ADMIN — SODIUM CHLORIDE, POTASSIUM CHLORIDE, SODIUM LACTATE AND CALCIUM CHLORIDE: 600; 310; 30; 20 INJECTION, SOLUTION INTRAVENOUS at 23:13

## 2021-01-01 RX ADMIN — DONEPEZIL HYDROCHLORIDE 5 MG: 5 TABLET, FILM COATED ORAL at 09:45

## 2021-01-01 RX ADMIN — HEPARIN SODIUM 5000 UNITS: 10000 INJECTION INTRAVENOUS; SUBCUTANEOUS at 15:14

## 2021-01-01 RX ADMIN — Medication 10 ML: at 08:25

## 2021-01-01 RX ADMIN — HEPARIN SODIUM 5000 UNITS: 10000 INJECTION INTRAVENOUS; SUBCUTANEOUS at 15:26

## 2021-01-01 RX ADMIN — HEPARIN SODIUM 5000 UNITS: 10000 INJECTION INTRAVENOUS; SUBCUTANEOUS at 05:00

## 2021-01-01 RX ADMIN — DONEPEZIL HYDROCHLORIDE 5 MG: 5 TABLET, FILM COATED ORAL at 09:10

## 2021-01-01 RX ADMIN — Medication 10 ML: at 20:18

## 2021-01-01 RX ADMIN — HEPARIN SODIUM 5000 UNITS: 10000 INJECTION INTRAVENOUS; SUBCUTANEOUS at 15:07

## 2021-01-01 RX ADMIN — IPRATROPIUM BROMIDE AND ALBUTEROL SULFATE 3 AMPULE: .5; 3 SOLUTION RESPIRATORY (INHALATION) at 14:18

## 2021-01-01 RX ADMIN — ASPIRIN 81 MG: 81 TABLET, COATED ORAL at 08:24

## 2021-01-01 RX ADMIN — ALBUTEROL SULFATE 2 PUFF: 90 AEROSOL, METERED RESPIRATORY (INHALATION) at 13:21

## 2021-01-01 RX ADMIN — ALBUTEROL SULFATE 2.5 MG: 2.5 SOLUTION RESPIRATORY (INHALATION) at 13:39

## 2021-01-01 RX ADMIN — PANTOPRAZOLE SODIUM 40 MG: 40 TABLET, DELAYED RELEASE ORAL at 16:22

## 2021-01-01 RX ADMIN — ACETAMINOPHEN 650 MG: 325 TABLET ORAL at 17:31

## 2021-01-01 RX ADMIN — LEVOFLOXACIN 500 MG: 500 INJECTION, SOLUTION INTRAVENOUS at 15:08

## 2021-01-01 RX ADMIN — METOPROLOL TARTRATE 100 MG: 50 TABLET, FILM COATED ORAL at 08:25

## 2021-01-01 RX ADMIN — FUROSEMIDE 20 MG: 10 INJECTION, SOLUTION INTRAMUSCULAR; INTRAVENOUS at 17:30

## 2021-01-01 RX ADMIN — Medication 10 ML: at 20:06

## 2021-01-01 RX ADMIN — SODIUM CHLORIDE, PRESERVATIVE FREE 10 ML: 5 INJECTION INTRAVENOUS at 16:12

## 2021-01-01 RX ADMIN — AZITHROMYCIN 500 MG: 500 INJECTION, POWDER, LYOPHILIZED, FOR SOLUTION INTRAVENOUS at 16:22

## 2021-01-01 RX ADMIN — ALBUTEROL SULFATE 2.5 MG: 2.5 SOLUTION RESPIRATORY (INHALATION) at 08:54

## 2021-01-01 RX ADMIN — ALBUTEROL SULFATE 2.5 MG: 2.5 SOLUTION RESPIRATORY (INHALATION) at 20:08

## 2021-01-01 RX ADMIN — PANTOPRAZOLE SODIUM 40 MG: 40 TABLET, DELAYED RELEASE ORAL at 06:34

## 2021-01-01 RX ADMIN — GABAPENTIN 100 MG: 100 CAPSULE ORAL at 20:15

## 2021-01-01 RX ADMIN — ALBUTEROL SULFATE 2.5 MG: 2.5 SOLUTION RESPIRATORY (INHALATION) at 01:12

## 2021-01-01 RX ADMIN — ALBUTEROL SULFATE 2.5 MG: 2.5 SOLUTION RESPIRATORY (INHALATION) at 08:15

## 2021-01-01 RX ADMIN — ALBUTEROL SULFATE 2.5 MG: 2.5 SOLUTION RESPIRATORY (INHALATION) at 13:50

## 2021-01-01 RX ADMIN — GABAPENTIN 100 MG: 100 CAPSULE ORAL at 20:13

## 2021-01-01 RX ADMIN — BUMETANIDE 1 MG: 0.25 INJECTION, SOLUTION INTRAMUSCULAR; INTRAVENOUS at 08:25

## 2021-01-01 RX ADMIN — SODIUM CHLORIDE, POTASSIUM CHLORIDE, SODIUM LACTATE AND CALCIUM CHLORIDE: 600; 310; 30; 20 INJECTION, SOLUTION INTRAVENOUS at 11:47

## 2021-01-01 RX ADMIN — BUMETANIDE 1 MG: 0.25 INJECTION, SOLUTION INTRAMUSCULAR; INTRAVENOUS at 08:40

## 2021-01-01 RX ADMIN — ISOSORBIDE MONONITRATE 30 MG: 30 TABLET, EXTENDED RELEASE ORAL at 08:25

## 2021-01-01 RX ADMIN — Medication 10 ML: at 08:40

## 2021-01-01 RX ADMIN — Medication 10 ML: at 20:13

## 2021-01-01 RX ADMIN — ALBUTEROL SULFATE 2.5 MG: 2.5 SOLUTION RESPIRATORY (INHALATION) at 20:04

## 2021-01-01 RX ADMIN — ALBUTEROL SULFATE 2.5 MG: 2.5 SOLUTION RESPIRATORY (INHALATION) at 20:00

## 2021-01-01 RX ADMIN — PANTOPRAZOLE SODIUM 40 MG: 40 TABLET, DELAYED RELEASE ORAL at 05:00

## 2021-01-01 RX ADMIN — HEPARIN SODIUM 5000 UNITS: 10000 INJECTION INTRAVENOUS; SUBCUTANEOUS at 06:22

## 2021-01-01 RX ADMIN — SODIUM CHLORIDE: 9 INJECTION, SOLUTION INTRAVENOUS at 12:40

## 2021-01-01 RX ADMIN — HEPARIN SODIUM 5000 UNITS: 10000 INJECTION INTRAVENOUS; SUBCUTANEOUS at 20:06

## 2021-01-01 RX ADMIN — ISOSORBIDE MONONITRATE 30 MG: 30 TABLET, EXTENDED RELEASE ORAL at 09:10

## 2021-01-01 RX ADMIN — AZITHROMYCIN 500 MG: 500 INJECTION, POWDER, LYOPHILIZED, FOR SOLUTION INTRAVENOUS at 17:15

## 2021-01-01 RX ADMIN — ALBUTEROL SULFATE 2.5 MG: 2.5 SOLUTION RESPIRATORY (INHALATION) at 01:55

## 2021-01-01 RX ADMIN — ALBUTEROL SULFATE 2.5 MG: 2.5 SOLUTION RESPIRATORY (INHALATION) at 07:33

## 2021-01-01 RX ADMIN — Medication 10 ML: at 09:22

## 2021-01-01 RX ADMIN — MEROPENEM 500 MG: 500 INJECTION INTRAVENOUS at 10:14

## 2021-01-01 RX ADMIN — HEPARIN SODIUM 5000 UNITS: 10000 INJECTION INTRAVENOUS; SUBCUTANEOUS at 06:04

## 2021-01-01 RX ADMIN — DONEPEZIL HYDROCHLORIDE 5 MG: 5 TABLET, FILM COATED ORAL at 10:24

## 2021-01-01 RX ADMIN — LEVOTHYROXINE SODIUM 50 MCG: 0.05 TABLET ORAL at 20:16

## 2021-01-01 RX ADMIN — HEPARIN SODIUM 5000 UNITS: 10000 INJECTION INTRAVENOUS; SUBCUTANEOUS at 05:53

## 2021-01-01 RX ADMIN — VANCOMYCIN HYDROCHLORIDE 1000 MG: 1 INJECTION, POWDER, LYOPHILIZED, FOR SOLUTION INTRAVENOUS at 23:13

## 2021-01-01 RX ADMIN — METOPROLOL TARTRATE 100 MG: 50 TABLET, FILM COATED ORAL at 20:13

## 2021-01-01 RX ADMIN — PANTOPRAZOLE SODIUM 40 MG: 40 TABLET, DELAYED RELEASE ORAL at 17:15

## 2021-01-01 ASSESSMENT — PAIN SCALES - GENERAL
PAINLEVEL_OUTOF10: 0
PAINLEVEL_OUTOF10: 5
PAINLEVEL_OUTOF10: 0

## 2021-01-01 ASSESSMENT — ENCOUNTER SYMPTOMS
SORE THROAT: 0
DIARRHEA: 0
ABDOMINAL PAIN: 0
VOMITING: 0
NAUSEA: 0
BACK PAIN: 0
EYE PAIN: 0
SHORTNESS OF BREATH: 1

## 2021-02-08 NOTE — PROGRESS NOTES
Enrique Perkins  Return Patient    Chief Complaint   Patient presents with    Toe Pain     saw pcp Dr. Courtney Capps on 11/16/2020       Subjective: This Danae Naranjo comes to clinic for foot and nail care. Pt currently has complaint of thickened, painful, elongated nails that he/she cannot manage by themselves. Pt. Relates pain to nails with shoe gear. Pt's primary care physician is Bennie Kinney MD.  Past Medical History:   Diagnosis Date    Acute on chronic congestive heart failure ([facility] [address]     Acute respiratory failure with hypoxia ([facility] [address] 12/31/2017    Arthritis     Atrial fibrillation (HCC)     Back pain 4-5 years    CAD (coronary artery disease)     Chronic diastolic CHF (congestive heart failure) ([facility] [address] 11/17/2017    Difficulty walking 4-5 years    Hypertension     Hypothyroidism     Osteoarthritis     Pneumonia due to organism 12/30/2017    Rheumatoid arthritis ([facility] [address] 5/4/2012    Spinal stenosis     Urinary tract infection, acute        Allergies   Allergen Reactions    Penicillins Hives    Sulfa Antibiotics Hives     Current Outpatient Medications on File Prior to Visit   Medication Sig Dispense Refill    pantoprazole (PROTONIX) 40 MG tablet Take 1 tablet by mouth 2 times daily (before meals) 60 tablet 0    donepezil (ARICEPT) 5 MG tablet Take 5 mg by mouth daily  0    gabapentin (NEURONTIN) 100 MG capsule Take 100 mg by mouth nightly.       furosemide (LASIX) 20 MG tablet Take 2 tablets by mouth 2 times daily 60 tablet 0    spironolactone (ALDACTONE) 25 MG tablet Take 0.5 tablets by mouth daily 30 tablet 0    cloNIDine (CATAPRES) 0.1 MG tablet Take 1 tablet by mouth 3 times daily (Patient taking differently: Take 0.1 mg by mouth 2 times daily ) 90 tablet 0    isosorbide mononitrate (IMDUR) 30 MG extended release tablet Take 1 tablet by mouth daily 30 tablet 0    metoprolol tartrate (LOPRESSOR) 25 MG tablet Take 2 tablets by mouth 2 times daily (Patient taking differently: Take 100 mg by mouth 2 times daily ) 60 tablet 0    losartan (COZAAR) 100 MG tablet Take 100 mg by mouth every morning      Acetaminophen (TYLENOL 8 HOUR PO) Take by mouth as needed       potassium chloride SA (K-DUR;KLOR-CON M) 10 MEQ tablet Take 1 tablet by mouth daily (with breakfast). 60 tablet 3    levothyroxine (SYNTHROID) 150 MCG tablet Take 50 mcg by mouth nightly 50mcg is correct dose      Glucosamine Sulfate 750 MG TABS Take 2 tablets by mouth daily. No current facility-administered medications on file prior to visit. Review of Systems   Cardiovascular: Negative. Genitourinary: Negative. Objective:  General: AAO x 3 in NAD. Derm  Toenail Description  Sites of Onychomycosis Involvement (Check affected area)  [x] [x] [x] [x] [x] [x] [x] [x] [x] [x]  5 4 3 2 1 1 2 3 4 5                          Right                                        Left    Thickness  [x] [x] [x] [x] [x] [x] [x] [x] [x] [x]  5 4 3 2 1 1 2 3 4 5                         Right                                        Left    Dystrophic Changes   [x] [x] [x] [x] [x] [x] [x] [x] [x] [x]  5 4 3 2 1 1 2 3 4 5                         Right                                        Left    Color  [x] [x] [x] [x] [x] [x] [x] [x] [x] [x]  5 4 3 2 1 1 2 3 4 5                          Right                                        Left    Incurvation/Ingrowin   [x] [x] [x] [x] [x] [x] [x] [x] [x] [x]  5 4 3 2 1 1 2 3 4 5                         Right                                        Left    Inflammation/Pain   [x] [x] [x] [x] [x] [x] [x] [x] [x] [x]  5 4 3  2 1 1 2 3 4 5                         Right                                        Left      Nails that are described above are all elongated thickened pitting mycotic yellowish incurvated causing pain with both shoe gear.  Palpation nails greater then 3 mm thick painful       Dermatologic Exam:hair loss noted  lower extremity    Skin lesion/ulceration   Skin   Callus Musculoskeletal:     1st MPJ ROM normal, Bilateral.  Muscle strength 5/5, Bilateral.  Pain present upon palpation of toenails 1-5  Bilateral., Bilateral.  Ankle ROM normal,Bilateral.    Dorsally contracted digits 2-5, Bilateral.  The ulcer that is located on the third toe left foot again is nondraining mildly painful no erythematous no exposed bone or tendon    Vascular:  Pulses   bilateral DP absent    PT absent    Neurological:  Sensation present to light touch to level of digits, Bilateral.    Foot Exam    General  General Appearance: appears stated age and healthy   Orientation: alert and oriented to person, place, and time   Assistance: cane use       Right Foot/Ankle     Inspection and Palpation  Hammertoes: fourth toe, fifth toe, second toe and third toe  Claw Toes: second toe, fifth toe, fourth toe and third toe  Hallux valgus: yes  Skin Exam: callus (callus  sub 2  right foot 3cm painful plantar to   2nd mpj  ), skin changes and abnormal color; Neurovascular  Dorsalis pedis: absent  Posterior tibial: absent      Left Foot/Ankle      Inspection and Palpation  Hammertoes: second toe and third toe  Skin Exam: skin changes and abnormal color; no ulcer (c)     Neurovascular  Dorsalis pedis: absent  Posterior tibial: absent             Ortho Exam  Q7   []Yes    []No                Q8   [x]Yes    []No                     Q9   []Yes    []No  Assessment:  80 y.o. female with:   1. Tinea unguium    2. Pain in left toe(s)    3. Peripheral vascular disease, unspecified ([address]    4. Pain in toe of right foot    5. Difficulty walking       No orders of the defined types were placed in this encounter. Plan:   Diagnosis was discussed with the pt and all of their questions were answered in detail. Proper foot hygiene and care was discussed with the pt. Patient to check feet daily and contact the office with any questions/problems/concerns. Other comorbidity noted and will be managed by PCP.   Pain waiver discussed with patient and confirmed. Debridement of all painful nails was performed with both manual and electrical debridement to prevent infection and/or ulceration. Patient tolerated the debridement well, without any complaints.   Patient was asymptomatic after debridement    1-5 bilateral    Electronically signed by Sheila Guerra DPM on 2/8/2021 at 12:07 PM  2/8/2021

## 2021-05-10 NOTE — PROGRESS NOTES
Aydee Singh  Return Patient    Chief Complaint   Patient presents with    Toe Pain     saw pcp Dr. Naye Javier 3/16/2021       Subjective: This Devon Dias comes to clinic for foot and nail care. Pt currently has complaint of thickened, painful, elongated nails that he/she cannot manage by themselves. Pt. Relates pain to nails with shoe gear.   Pt's primary care physician is Becca Coronado MD.  Patient has a new issue today of patient relates twisting her ankle 4 days ago very painful swollen  Past Medical History:   Diagnosis Date    Acute on chronic congestive heart failure ([facility] [address]     Acute respiratory failure with hypoxia ([facility] [address] 12/31/2017    Arthritis     Atrial fibrillation (HCC)     Back pain 4-5 years    CAD (coronary artery disease)     Chronic diastolic CHF (congestive heart failure) ([facility] [address] 11/17/2017    Difficulty walking 4-5 years    Hypertension     Hypothyroidism     Osteoarthritis     Pneumonia due to organism 12/30/2017    Rheumatoid arthritis ([facility] [address] 5/4/2012    Spinal stenosis     Urinary tract infection, acute        Allergies   Allergen Reactions    Penicillins Hives    Sulfa Antibiotics Hives     Current Outpatient Medications on File Prior to Visit   Medication Sig Dispense Refill    pantoprazole (PROTONIX) 40 MG tablet Take 1 tablet by mouth 2 times daily (before meals) 60 tablet 0    donepezil (ARICEPT) 5 MG tablet Take 5 mg by mouth daily  0    furosemide (LASIX) 20 MG tablet Take 2 tablets by mouth 2 times daily 60 tablet 0    spironolactone (ALDACTONE) 25 MG tablet Take 0.5 tablets by mouth daily 30 tablet 0    cloNIDine (CATAPRES) 0.1 MG tablet Take 1 tablet by mouth 3 times daily (Patient taking differently: Take 0.1 mg by mouth 2 times daily ) 90 tablet 0    isosorbide mononitrate (IMDUR) 30 MG extended release tablet Take 1 tablet by mouth daily 30 tablet 0    metoprolol tartrate (LOPRESSOR) 25 MG tablet Take 2 tablets by mouth 2 times daily (Patient taking Dermatologic Exam:hair loss noted  lower extremity    Skin lesion/ulceration   Skin   Callus   Musculoskeletal:     1st MPJ ROM normal, Bilateral.  Muscle strength 5/5, Bilateral.  Pain present upon palpation of toenails 1-5  Bilateral., Bilateral.  Ankle ROM normal,Bilateral.    Dorsally contracted digits 2-5, Bilateral.  The ulcer that is located on the third toe left foot again is nondraining mildly painful no erythematous no exposed bone or tendon    Vascular:  Pulses   bilateral DP absent    PT absent    Neurological:  Sensation present to light touch to level of digits, Bilateral.    Foot Exam    General  General Appearance: appears stated age and healthy   Orientation: alert and oriented to person, place, and time   Assistance: cane use       Right Foot/Ankle     Inspection and Palpation  Swelling: dorsum (Massive swelling right foot ankle. Negative Homans' sign)  Hammertoes: fourth toe, fifth toe, second toe and third toe  Claw Toes: second toe, fifth toe, fourth toe and third toe  Hallux valgus: yes  Skin Exam: callus (callus  sub 2  right foot 3cm painful plantar to   2nd mpj  ), skin changes and abnormal color; Neurovascular  Dorsalis pedis: absent  Posterior tibial: absent    Tests  Jeison's Sign: negative      Left Foot/Ankle      Inspection and Palpation  Hammertoes: second toe and third toe  Skin Exam: skin changes and abnormal color; no ulcer (c)     Neurovascular  Dorsalis pedis: absent  Posterior tibial: absent             Right Ankle Exam     Tenderness   The patient is experiencing tenderness in the ATF and lateral malleolus. Swelling: severe    Range of Motion   Dorsiflexion: abnormal   Plantar flexion: abnormal   Eversion: abnormal   Inversion: abnormal     Tests   Anterior drawer: negative  Varus tilt: negative    Other   Erythema: absent  Sensation: normal         X-ray of the right foot showed no fracture.   X-ray of the right ankle showed a possible nondisplaced fracture of the distal fibula. Q7   []Yes    []No                Q8   [x]Yes    []No                     Q9   []Yes    []No  Assessment:  80 y.o. female with:   1. Contusion of right ankle, initial encounter    2. Closed fracture of distal end of right fibula, unspecified fracture morphology, initial encounter    3. Tinea unguium    4. Pain in left toe(s)    5. Peripheral vascular disease, unspecified ([facility] [address]    6. Pain in toe of right foot    7. Difficulty walking       Orders Placed This Encounter   Procedures    XR FOOT RIGHT (MIN 3 VIEWS)     Standing Status:   Future     Number of Occurrences:   1     Standing Expiration Date:   5/10/2022     Order Specific Question:   Reason for exam:     Answer:   pain    XR ANKLE RIGHT (MIN 3 VIEWS)     Standing Status:   Future     Number of Occurrences:   1     Standing Expiration Date:   5/10/2022     Order Specific Question:   Reason for exam:     Answer:   pain         Plan: Patient today I had the x-rays reviewed I did place her in an Unna boot for 3 days then a cam walker limited weightbearing for 3 weeks reappoint 3 weeks. Cam Walker  Signed informed consent was obtained from the patient. Patient applied the cam walker to the affected limb. This device fit well. Patient was given written and verbal instructions regarding this cam walker. This is medically necessary to help reduce pain and promote and expedite healing. An unna boot  compressive wrap was applied to the right  lower extremity. It's purpose is to  decrease  the amount of edema present, and to allow proper healing of the soft tissues. The patient was instructed to keep the unna boot clean, dry and intact remove after 3 daysThe patient was instructed to look for signs of redness, irritation, blistering and pain. If these or any other symptoms were to develop, they were advised to contact the office immediately for reevaluation. Diagnosis was discussed with the pt and all of their questions were answered in detail. Proper foot hygiene and care was discussed with the pt. Patient to check feet daily and contact the office with any questions/problems/concerns. Other comorbidity noted and will be managed by PCP. Pain waiver discussed with patient and confirmed. Debridement of all painful nails was performed with both manual and electrical debridement to prevent infection and/or ulceration. Patient tolerated the debridement well, without any complaints.   Patient was asymptomatic after debridement    1-5 bilateral    Electronically signed by Mike Lyon DPM on 5/10/2021 at 12:02 PM  5/10/2021

## 2021-06-01 NOTE — PROGRESS NOTES
6/1/2021    Cristopher Herman  7/4/1924    This resident is being seen today for skilled evaluation visit. She is a resident who has long-term medical conditions including primary osteoarthritis of the left shoulder, lateral malleolus fracture to the right ankle, gastroesophageal reflux disease without esophagitis, hypothyroidism, congestive heart failure, hypertension, Alzheimer's dementia of late onset, arthritis, cardiac angina, cardiac arrhythmia with history of atrial fibrillation with resident currently in normal sinus rhythm, dyspnea with exertion, edema to lower extremities, hypertension, peripheral vascular disease, torn rotator cuff to the left due to history of falls, urinary incontinence with a surgical history of arthroplasty to the knee in 2009, colonoscopy, hysterectomy. She is a 80 y.o. female resident who is being seen today for skilled services with this resident having the benefit of physical therapy and Occupational Therapy and is currently bed to chair confined with recommendations to transfer by way of assist x1. It is of note to mention that this resident did have a recent closed nondisplaced fracture to the lateral malleolus of the right fibula with routine healing but was noted to be nonambulatory, due to the recent fracture. She also did not have good upper body strength for transferring purposes and it was recommended that she receive skilled therapy in terms of PT and OT. They felt that her  was going to be unable to care for her in the home setting. She is a rather pleasant lady who offers no acute complaints in terms of pain, headaches or dizziness, sore throat, chest pain, coughing or shortness of breath, nausea or vomiting, constipation or diarrhea, dysuria or frequency, fever or chills, falls or syncopal events. .        Medications:  Tramadol 50 mg at bedtime  Donepezil 10 mg at bedtime  Metoprolol tartrate 50 mg twice daily  Rivastigmine patch 4.6 mg per 24 hours apply daily Memantine 10 mg twice daily  Losartan potassium 100 mg daily  Pantoprazole 40 mg twice daily  Isosorbide dinitrate 30 mg daily   Lasix 40 mg Daily   potassium chloride ER 10 mEq daily  Levothyroxine 75 mcg daily        Objective     Vital Signs: /68 pulse 76 respirations 18 temperature 98.2 O2 96% weight 139.4 pounds      Physical examination:Skin is essentially warm and dry. HEENT unremarkable. Neck is supple. Heart regular rate and rhythm. No rubs, gallops or murmurs noted. Lungs are clear to auscultation. No evidence of rhonchi, rales, or wheezing. Abdomen is soft, supple and non-tender. Bowel sounds are noted x4 quadrants. No rigidity, guarding or rebound tenderness. Negative Tee's, negative McBurney's, negative Tae's. Extremities; no true pitting edema. Pulses are adequate. No clubbing  or no cyanosis noted. Neurologically she  is alert and oriented x2-3. No evidence of paralysis or paresthesias noted. Diagnoses and all orders for this visit:    Closed nondisplaced fracture of lateral malleolus of right fibula, sequela  Comments:  Given the benefit of PT OT and pain management. Pressure injury of left foot, unstageable (HCC)  Comments:  Seen in conjunction with the department of podiatry services    Rheumatoid arthritis involving multiple sites, unspecified whether rheumatoid factor present ([address]  Comments:  Currently stable with pain management    Pulmonary hypertension ([address]  Comments:  Stable    Peripheral vascular disease, unspecified ([address]  Comments:  Stable with diuretic management with recommendations to offload    Chronic atrial fibrillation ([address]  Comments:  Currently in normal sinus rhythm but does maintain metoprolol        Plan: Plan of care was discussed with the healthcare team with meds and labs reviewed.   Resident has been stable and doing fairly well with respect to her therapy program.  She does follow with the department of podiatry services and was seen in conjunction with Dr. Mary Manning in this regard. Furthermore, she is followed in conjunction with our dietitian with risk of malnutrition and recommendations were given for liquid protein twice daily with fortified cereal and a house supplement. Resident does furthermore maintain the benefit of a regular diet and she will continue with the benefit of PT and OT. I will track her intakes, monitor her weights and behaviors, and see her routinely and as needed with further orders forthcoming. GABBI MARCUM, APRN - CNP      *Note was creating using voice recognition software.   The document was reviewed however grammatical errors may exist.

## 2021-06-01 NOTE — PROGRESS NOTES
21  Paul Munson : 1924 Sex: female  Age: 80 y.o. Patient was referred by Dianne Singletary MD    Chief Complaint   Patient presents with    Foot Injury     right ankle fx has been in cam walker and using wheelchair since 5/10 pt recently went into [facility] patient is seen today follow-up of her right ankle fracture patient has been in a cam walker for 3 weeks she is in rehab at a nursing home she is doing excellent. HPI  Review of Systems  Const: Denies constitutional symptoms  Musculo: Denies symptoms other than stated above  Skin: Denies symptoms other than stated above       Current Outpatient Medications:     pantoprazole (PROTONIX) 40 MG tablet, Take 1 tablet by mouth 2 times daily (before meals), Disp: 60 tablet, Rfl: 0    donepezil (ARICEPT) 5 MG tablet, Take 5 mg by mouth daily, Disp: , Rfl: 0    gabapentin (NEURONTIN) 100 MG capsule, Take 100 mg by mouth nightly., Disp: , Rfl:     furosemide (LASIX) 20 MG tablet, Take 2 tablets by mouth 2 times daily, Disp: 60 tablet, Rfl: 0    spironolactone (ALDACTONE) 25 MG tablet, Take 0.5 tablets by mouth daily, Disp: 30 tablet, Rfl: 0    cloNIDine (CATAPRES) 0.1 MG tablet, Take 1 tablet by mouth 3 times daily (Patient taking differently: Take 0.1 mg by mouth 2 times daily ), Disp: 90 tablet, Rfl: 0    isosorbide mononitrate (IMDUR) 30 MG extended release tablet, Take 1 tablet by mouth daily, Disp: 30 tablet, Rfl: 0    metoprolol tartrate (LOPRESSOR) 25 MG tablet, Take 2 tablets by mouth 2 times daily (Patient taking differently: Take 100 mg by mouth 2 times daily ), Disp: 60 tablet, Rfl: 0    losartan (COZAAR) 100 MG tablet, Take 100 mg by mouth every morning, Disp: , Rfl:     Acetaminophen (TYLENOL 8 HOUR PO), Take by mouth as needed , Disp: , Rfl:     potassium chloride SA (K-DUR;KLOR-CON M) 10 MEQ tablet, Take 1 tablet by mouth daily (with breakfast). , Disp: 60 tablet, Rfl: 3    levothyroxine (SYNTHROID) inversion eversion dorsiflexion plantarflexion showing no pain or reduced range of motion. NAIL   Web space   Derm:    New x-rays were taken showing a healing fibular fracture. Assessment and Plan: Today we are deseeding the cam walker placing her in a stirrup brace   . Ankle Stirrup Brace Dispensing  Signed informed consent was obtained from the patient. Brace was fitted and applied. Patient was counseled. Brace is medically necessary to promote and expedite healing ad reduce pain and swelling. Patient may resume ambulating with a walker. Lindsay Hunter was seen today for foot injury. Diagnoses and all orders for this visit:    Closed nondisplaced fracture of lateral malleolus of right fibula with routine healing, subsequent encounter  -     XR FOOT RIGHT (MIN 3 VIEWS); Future  -     XR ANKLE RIGHT (MIN 3 VIEWS); Future        No follow-ups on file. Seen By:  Kan Gleason DPM      Document was created using voice recognition software. Note was reviewed, however may contain grammatical errors.

## 2021-06-10 NOTE — PROGRESS NOTES
6/9/2021    Benjamin Hickmans  7/4/1924    This resident is being seen today for skilled evaluation visit. She is a resident who has long-term medical conditions including primary osteoarthritis of the left shoulder, lateral malleolus fracture to the right ankle, gastroesophageal reflux disease without esophagitis, hypothyroidism, congestive heart failure, hypertension, Alzheimer's dementia of late onset, arthritis, cardiac angina, cardiac arrhythmia with history of atrial fibrillation with resident currently in normal sinus rhythm, dyspnea with exertion, edema to lower extremities, hypertension, peripheral vascular disease, torn rotator cuff to the left due to history of falls, urinary incontinence with a surgical history of arthroplasty to the knee in 2009, colonoscopy, hysterectomy. She is a 80 y.o. female resident who is being seen today for a skilled evaluation with this resident remaining relatively stable. She is currently seen in conjunction with physical therapy and Occupational Therapy and remains bed to chair confined with recommendations to transfer by way of assist x1. Staff indicates that she has had no changes in behavior in terms of aggressive, combative, or disruptive behaviors. She currently denies any pain, headaches or dizziness, sore throat, chest pain, coughing or shortness of breath, nausea or vomiting, constipation or diarrhea, dysuria or frequency, fever or chills, falls or syncopal events. This is a resident who did recently suffer from a closed nondisplaced fracture to the lateral malleolus to the right fibula and was noted to be essentially nonambulatory in this regard and was given recommendations for therapy services.           Medications:  Tramadol 50 mg at bedtime  Donepezil 10 mg at bedtime  Metoprolol tartrate 50 mg twice daily  Rivastigmine patch 4.6 mg per 24 hours apply daily  Memantine 10 mg twice daily  Losartan potassium 100 mg daily  Pantoprazole 40 mg twice daily  Isosorbide dinitrate 30 mg daily   Lasix 40 mg Daily   potassium chloride ER 10 mEq daily  Levothyroxine 75 mcg daily        Objective     Vital Signs: /64 pulse 68 respirations 16 temperature 98.0 O2 96% weight 131.2 pounds      Physical examination:Skin is essentially warm and dry. HEENT unremarkable. Neck is supple. Heart regular rate and rhythm. No rubs, gallops or murmurs noted. Lungs are clear to auscultation. No evidence of rhonchi, rales, or wheezing. Abdomen is soft, supple and non-tender. Bowel sounds are noted x4 quadrants. No rigidity, guarding or rebound tenderness. Negative Tee's, negative McBurney's, negative Tae's. Extremities; no true pitting edema. Pulses are adequate. No clubbing  or no cyanosis noted. Neurologically she  is alert and oriented x2-3. No evidence of paralysis or paresthesias noted. Diagnoses and all orders for this visit:    Paroxysmal atrial fibrillation ([facility] [address]  Comments:  Currently stable with metoprolol    Closed nondisplaced fracture of lateral malleolus of right fibula, sequela  Comments:  Stable with tramadol for pain management and the benefit of PT/OT    Rheumatoid arthritis involving multiple sites, unspecified whether rheumatoid factor present ([address]  Comments:  Maintain tramadol    Hypothyroidism, unspecified type  Comments:  Stable with levothyroxine and close observation of TSH    Late onset Alzheimer's dementia without behavioral disturbance ([facility] [address]  Comments:  Stable with donepezil with memantine        Plan: Plan of care was discussed with the healthcare team with meds and labs reviewed. This resident does maintain the benefit of tramadol at bedtime for pain management and appears to be doing relatively well in this regard. She is making some degree of progress with respect to her therapy program.  Recommendations have been given for her to walk with therapy with the benefit of a brace.   She will maintain the benefit of her medical regimen with her regular

## 2021-06-15 NOTE — PROGRESS NOTES
6/15/2021    Jayde Gill  7/4/1924    This resident is being seen today for skilled evaluation visit. She is a resident who has long-term medical conditions including primary osteoarthritis of the left shoulder, lateral malleolus fracture to the right ankle, gastroesophageal reflux disease without esophagitis, hypothyroidism, congestive heart failure, hypertension, Alzheimer's dementia of late onset, arthritis, cardiac angina, cardiac arrhythmia with history of atrial fibrillation with resident currently in normal sinus rhythm, dyspnea with exertion, edema to lower extremities, hypertension, peripheral vascular disease, torn rotator cuff to the left due to history of falls, urinary incontinence with a surgical history of arthroplasty to the knee in 2009, colonoscopy, hysterectomy. She is a 80 y.o. female resident who is being seen today for skilled services with this resident remaining bed to chair confined with recommendations to transfer by way of assist x1. Staff indicates that she has had no changes in behavior in terms of aggressive, combative, or disruptive behaviors. She does maintain the benefit of physical therapy with occupational therapy services and appears to be tolerating this in a satisfactory manner. She does indicate that she has had what she describes as a vibration and feels that it is possibly consistent with some underlying atrial fibrillation. It is of note to mention however that she was recently seen in conjunction with the cardiologist Dr. Dilip De La Vega and states that she was given a good bill of health and is not to return until December 16, 2021. Furthermore, she does maintain the benefit of her metoprolol. This is a resident who did recently suffer from a closed nondisplaced fracture to the lateral malleolus and right fibula has recommendations to walk with a brace.   She currently denies any pain, headaches or dizziness, sore throat, chest pain, coughing or shortness of breath, nausea or vomiting, constipation or diarrhea, dysuria or frequency, fever or chills, falls or syncopal events. Medications:  Tramadol 50 mg at bedtime  Donepezil 10 mg at bedtime  Metoprolol tartrate 50 mg twice daily  Rivastigmine patch 4.6 mg per 24 hours apply daily  Memantine 10 mg twice daily  Losartan potassium 100 mg daily  Pantoprazole 40 mg twice daily  Isosorbide dinitrate 30 mg daily   Lasix 40 mg Daily   potassium chloride ER 10 mEq daily  Levothyroxine 75 mcg daily        Objective     Vital Signs: /68 pulse 74 respirations 16 temperature 98.3 O2 96% weight 130.4 pounds      Physical examination:Skin is essentially warm and dry. HEENT unremarkable. Neck is supple. Heart regular rate and rhythm. No rubs, gallops or murmurs noted. Lungs are clear to auscultation. No evidence of rhonchi, rales, or wheezing. Abdomen is soft, supple and non-tender. Bowel sounds are noted x4 quadrants. No rigidity, guarding or rebound tenderness. Negative Tee's, negative McBurney's, negative Tae's. Extremities; no true pitting edema. Pulses are adequate. No clubbing  or no cyanosis noted. Neurologically she  is alert and oriented x2-3. No evidence of paralysis or paresthesias noted.     Diagnoses and all orders for this visit:    Chronic atrial fibrillation ([address]  Comments:  Maintain low-dose metoprolol with Imdur and follow accordingly with department of cardiology    Closed nondisplaced fracture of lateral malleolus of right fibula, sequela  Comments:  Maintain physical therapy with occupational therapy and pain management    Peripheral vascular disease, unspecified ([address]  Comments:  Maintain diuretic management with offloading of lower extremities throughout the day    Rheumatoid arthritis involving multiple sites, unspecified whether rheumatoid factor present ([address]  Comments:  Currently stable with the benefit of pain management    Late onset Alzheimer's dementia without behavioral disturbance ([address] Comments:  Stable with donepezil with rivastigmine        Plan: Plan of care was discussed with the healthcare team with meds and labs reviewed. Resident does admit to being relatively unsteady in terms of ambulation and does maintain the benefit of her therapy program.  Regarding her concerns about question of atrial fibrillation, I did ask that she contact the nurse when she has the feeling so that she can be assessed properly. She does not appear to be in atrial fibrillation on today's evaluation and again was recently seen in conjunction with the department of cardiology. She has been a continue with her therapy program with her current medical regimen and I will track her intakes, monitor her weights and behaviors, and see her routinely and as needed with further orders forthcoming. GABBI MARCUM, APRN - CNP      *Note was creating using voice recognition software.   The document was reviewed however grammatical errors may exist.

## 2021-06-22 NOTE — PROGRESS NOTES
6/22/2021    Alayna Pritchett  7/4/1924    This resident is being seen today for skilled evaluation visit. She is a resident who has long-term medical conditions including primary osteoarthritis of the left shoulder, lateral malleolus fracture to the right ankle, gastroesophageal reflux disease without esophagitis, hypothyroidism, congestive heart failure, hypertension, Alzheimer's dementia of late onset, arthritis, cardiac angina, cardiac arrhythmia with history of atrial fibrillation with resident currently in normal sinus rhythm, dyspnea with exertion, edema to lower extremities, hypertension, peripheral vascular disease, torn rotator cuff to the left due to history of falls, urinary incontinence with a surgical history of arthroplasty to the knee in 2009, colonoscopy, hysterectomy. She is a 80 y.o. female resident who is being seen today for a skilled evaluation visit with this resident alert responsive to verbal and tactile stimuli with some evidence of baseline confusion. She was seen in conjunction with her  at bedside. She does remain bed to chair confined and has recommendations to transfer by way of assist x1 and staff indicates that she has had no changes in behavior in terms of aggressive, combative, or disruptive behaviors. There are pharmacy recommendations with respect to this resident regarding the fact that she was on Aricept and Exelon both and they felt that one of them should be discontinued with upward titration of the other. Resident currently denies any complaints in terms of pain, headaches or dizziness, coughing or shortness of breath, nausea or vomiting, diarrhea, fever or chills, recent falls or syncopal events. She does however admit to some degree of constipation despite Colace.               Medications:  Tramadol 50 mg at bedtime  Donepezil 15 mg at bedtime  Metoprolol tartrate 50 mg twice daily  Memantine 10 mg twice daily  Losartan potassium 100 mg daily  Pantoprazole 40 mg twice daily  Isosorbide dinitrate 30 mg daily   Lasix 40 mg Daily   potassium chloride ER 10 mEq daily  Levothyroxine 75 mcg daily  Colace 100 mg twice daily        Objective     Vital Signs: /66 pulse 78 respirations 16 temperature 98.0 O2 98% weight 132.8 pounds      Physical examination:Skin is essentially warm and dry. HEENT unremarkable. Neck is supple. Heart regular rate and rhythm. No rubs, gallops or murmurs noted. Lungs are clear to auscultation. No evidence of rhonchi, rales, or wheezing. Abdomen is soft, supple and non-tender. Bowel sounds are noted x4 quadrants. No rigidity, guarding or rebound tenderness. Negative Tee's, negative McBurney's, negative Tae's. Extremities; no true pitting edema. Pulses are adequate. No clubbing  or no cyanosis noted. Neurologically she  is alert and oriented x2-3. No evidence of paralysis or paresthesias noted. Diagnoses and all orders for this visit:    Constipation, unspecified constipation type  Comments:  Upward titrate Colace to 100 mg twice daily    Late onset Alzheimer's dementia without behavioral disturbance (HCC)  Comments:  Discontinue rivastigmine patch and upward titrate donepezil to 15 mg at bedtime. Maintain memantine as indicated    Chronic diastolic CHF (congestive heart failure) (HCC)  Comments:  Maintain diuretic management    Hypothyroidism, unspecified type  Comments:  Stable with levothyroxine and close observation of TSH    Rheumatoid arthritis involving multiple sites, unspecified whether rheumatoid factor present ([facility] [address]  Comments:  Stable with tramadol for pain management        Plan: Plan of care was discussed with the healthcare team with meds and labs reviewed. After further evaluation of this resident, I am going to upward titrate her Colace to twice daily dosing and then furthermore reevaluate her with respect to constipation.   And furthermore going to discontinue her Exelon and upward titrate her Aricept to 15 mg daily and maintain close observation in this regard. She will continue forth with the benefit of her therapy services with her regular diet and I will track her intakes, monitor her weights and behaviors, and see her routinely and as needed with further orders forthcoming. GABBI MARCUM, MARLEN - CNP      *Note was creating using voice recognition software.   The document was reviewed however grammatical errors may exist.

## 2021-07-06 NOTE — PROGRESS NOTES
7/6/2021    Versa Bryant  7/4/1924    This resident is being seen today for skilled evaluation visit. She is a resident who has long-term medical conditions including primary osteoarthritis of the left shoulder, lateral malleolus fracture to the right ankle, gastroesophageal reflux disease without esophagitis, hypothyroidism, congestive heart failure, hypertension, Alzheimer's dementia of late onset, arthritis, cardiac angina, cardiac arrhythmia with history of atrial fibrillation with resident currently in normal sinus rhythm, dyspnea with exertion, edema to lower extremities, hypertension, peripheral vascular disease, torn rotator cuff to the left due to history of falls, urinary incontinence with a surgical history of arthroplasty to the knee in 2009, colonoscopy, hysterectomy. She is a 80 y.o. female resident who is being seen today for skilled services with this resident bed to chair confined with recommendations to transfer by way of assist x1. Staff does indicate that she has been stable and they states that she has had no changes in behavior in terms of aggressive, combative, or disruptive behaviors. Resident does have some evidence of baseline confusion but offers no acute complaints. She denies any pain, headaches or dizziness, sore throat, chest pain, coughing or shortness of breath, nausea or vomiting, constipation or diarrhea, dysuria or frequency, fever or chills, falls or syncopal events.                 Medications:  Tramadol 50 mg at bedtime  Donepezil 15 mg at bedtime  Metoprolol tartrate 50 mg twice daily  Memantine 10 mg twice daily  Losartan potassium 100 mg daily  Pantoprazole 40 mg twice daily  Isosorbide dinitrate 30 mg daily   Lasix 40 mg Daily   potassium chloride ER 10 mEq daily  Levothyroxine 75 mcg daily  Colace 100 mg twice daily        Objective     Vital Signs: /68 pulse 74 respirations 16 temperature 98.0 O2 96% weight 131.2 pounds      Physical examination:Skin is essentially warm and dry. HEENT unremarkable. Neck is supple. Heart regular rate and rhythm. No rubs, gallops or murmurs noted. Lungs are clear to auscultation. No evidence of rhonchi, rales, or wheezing. Abdomen is soft, supple and non-tender. Bowel sounds are noted x4 quadrants. No rigidity, guarding or rebound tenderness. Negative Tee's, negative McBurney's, negative Tae's. Extremities; no true pitting edema. Pulses are adequate. No clubbing  or no cyanosis noted. Neurologically she  is alert and oriented x2-3. No evidence of paralysis or paresthesias noted. Diagnoses and all orders for this visit:    Chronic atrial fibrillation (HCC)  Comments:  Maintain metoprolol and follow accordingly with Dr. Ernestine Madden    Constipation, unspecified constipation type  Comments:  Maintain Colace    Late onset Alzheimer's dementia without behavioral disturbance ([facility] [address]  Comments:  Stable with donepezil and memantine    Peripheral vascular disease, unspecified ([address]  Comments:  Stable with diuretic management and offloading    Rheumatoid arthritis involving multiple sites, unspecified whether rheumatoid factor present ([facility] [address]  Comments:  Controlled with the benefit of tramadol at bedtime        Plan: Plan of care was discussed with the healthcare team with meds and labs reviewed. Resident appears to be stable at this time and denies any complaints of pain. She was recently under assessment for constipation, which appears to be improved. And therefore going to maintain her current plan of care with respect to her medical regimen and the benefit of her therapy program in terms of PT/OT. She will maintain the benefit of a regular diet with recent discontinuation of speech therapy services. She will follow up with the department of cardiology with Dr. Ernestine Madden he is 12/16/2021, as previously scheduled.   I will furthermore continue with her plan of care with the benefit of her therapy program and I will track her intakes, monitor her weights and behaviors, and see her routinely and as needed with further orders forthcoming. GABBI MARCUM, APRN - CNP      *Note was creating using voice recognition software.   The document was reviewed however grammatical errors may exist.

## 2021-07-13 NOTE — PROGRESS NOTES
7/13/2021    Cherene Smoke  7/4/1924    This resident is being seen today for skilled evaluation visit. She is a resident who has long-term medical conditions including primary osteoarthritis of the left shoulder, lateral malleolus fracture to the right ankle, gastroesophageal reflux disease without esophagitis, hypothyroidism, congestive heart failure, hypertension, Alzheimer's dementia of late onset, arthritis, cardiac angina, cardiac arrhythmia with history of atrial fibrillation with resident currently in normal sinus rhythm, dyspnea with exertion, edema to lower extremities, hypertension, peripheral vascular disease, torn rotator cuff to the left due to history of falls, urinary incontinence with a surgical history of arthroplasty to the knee in 2009, colonoscopy, hysterectomy. She is a 80 y.o. female resident who is being seen today for a skilled evaluation visit with this resident relatively stable at this time. She denied any current complaints in terms of pain, headaches or dizziness, sore throat, chest pain, coughing or shortness of breath, nausea or vomiting, constipation or diarrhea, dysuria or frequency, fever or chills, falls or syncopal events. She is a resident who remains bed to chair confined with recommendations to transfer by way of assist x1 and does maintain the benefit of physical therapy and occupational therapy services. In case that she has had no changes in behavior in terms of aggressive, combative, or disruptive behaviors.                     Medications:  Tramadol 50 mg at bedtime  Donepezil 15 mg at bedtime  Metoprolol tartrate 50 mg twice daily  Memantine 10 mg twice daily  Losartan potassium 100 mg daily  Pantoprazole 40 mg twice daily  Isosorbide dinitrate 30 mg daily   Lasix 40 mg Daily   potassium chloride ER 10 mEq daily  Levothyroxine 75 mcg daily  Colace 100 mg twice daily        Objective     Vital Signs: /60 pulse 63 respirations 20temperature 98.0 O2 92% weight 120 pounds      Physical examination:Skin is essentially warm and dry. HEENT unremarkable. Neck is supple. Heart regular rate and rhythm. No rubs, gallops or murmurs noted. Lungs are clear to auscultation. No evidence of rhonchi, rales, or wheezing. Abdomen is soft, supple and non-tender. Bowel sounds are noted x4 quadrants. No rigidity, guarding or rebound tenderness. Negative Tee's, negative McBurney's, negative Tae's. Extremities; no true pitting edema. Pulses are adequate. No clubbing  or no cyanosis noted. Neurologically she  is alert and oriented x2-3. No evidence of paralysis or paresthesias noted. Diagnoses and all orders for this visit:    Weight loss  Comments:  Initiate Remeron 7.5 mg daily and supplement 3 times daily    Paroxysmal atrial fibrillation (HCC)  Comments:  Currently stable with metoprolol tartrate    Rheumatoid arthritis involving multiple sites, unspecified whether rheumatoid factor present ([facility] [address]  Comments:  Currently stable without complaints of pain and maintaining tramadol at bedtime    Late onset Alzheimer's dementia without behavioral disturbance (HCC)  Comments:  Stable with donepezil and memantine    Chronic diastolic CHF (congestive heart failure) ([facility] [address]  Comments:  Controlled with low-dose diuretic managed        Plan: Plan of care was discussed with the healthcare team with meds and labs reviewed. Resident does appear to be stable outside of my concern for weight loss. Last known weight was dated 6/28/2021 and she was 131.2 pounds and as of 7/9/2021 she is 120 pounds. She is currently seen in conjunction with dietary management. She is currently on a regular diet, although I think she could benefit from supplement in addition to Remeron 7.5 mg at bedtime. I will then reevaluate her in the course the next 1 to 2 weeks for any improvement in terms of appetite.   She will furthermore maintain her current plan of care with the benefit of physical therapy and Occupational Therapy and I will see her routinely and as needed with further orders forthcoming. GABBI MARCUM, APRN - CNP      *Note was creating using voice recognition software.   The document was reviewed however grammatical errors may exist.

## 2021-07-20 NOTE — PROGRESS NOTES
7/20/2021    Farhat Dumont  7/4/1924    This resident is being seen today for skilled evaluation visit. She is a resident who has long-term medical conditions including primary osteoarthritis of the left shoulder, lateral malleolus fracture to the right ankle, gastroesophageal reflux disease without esophagitis, hypothyroidism, congestive heart failure, hypertension, Alzheimer's dementia of late onset, arthritis, cardiac angina, cardiac arrhythmia with history of atrial fibrillation with resident currently in normal sinus rhythm, dyspnea with exertion, edema to lower extremities, hypertension, peripheral vascular disease, torn rotator cuff to the left due to history of falls, urinary incontinence with a surgical history of arthroplasty to the knee in 2009, colonoscopy, hysterectomy. She is a 80 y.o. female resident who is being seen today for skilled services with this resident having the benefits of physical therapy and Occupational Therapy. She does currently have recommendations to remain bed to chair confined in transfer by way of assist x1. Staff indicates that she has had no changes in behavior in terms of aggressive, combative, or disruptive behaviors. I did have concerns regarding her depression with the resident specifically stating that she just \"wants to die. \"  Resident has no plan in terms of suicide and I do not feel that she is so depressed that she needs any supervision. She is just frustrated with her life and her living conditions and would rather be back in her home setting. She was agreeable to medications in this regard. She is otherwise stable and denies any complaints of pain,headaches or dizziness, sore throat, chest pain, coughing or shortness of breath, nausea or vomiting, constipation or diarrhea, dysuria or frequency, fever or chills, falls or syncopal events.                 Medications:  Tramadol 50 mg at bedtime  Donepezil 15 mg at bedtime  Metoprolol tartrate 50 mg twice daily  Memantine 10 mg twice daily  Losartan potassium 100 mg daily  Pantoprazole 40 mg twice daily  Isosorbide dinitrate 30 mg daily   Lasix 40 mg Daily   potassium chloride ER 10 mEq daily  Levothyroxine 75 mcg daily  Colace 100 mg twice daily  Remeron 15 mg at bedtime        Objective     Vital Signs: /68 pulse 72 respirations 16 temperature 98.0 O2 96% weight 134 pounds      Physical examination:Skin is essentially warm and dry. HEENT unremarkable. Neck is supple. Heart regular rate and rhythm. No rubs, gallops or murmurs noted. Lungs are clear to auscultation. No evidence of rhonchi, rales, or wheezing. Abdomen is soft, supple and non-tender. Bowel sounds are noted x4 quadrants. No rigidity, guarding or rebound tenderness. Negative Tee's, negative McBurney's, negative Tae's. Extremities; no true pitting edema. Pulses are adequate. No clubbing  or no cyanosis noted. Neurologically she  is alert and oriented x2-3. No evidence of paralysis or paresthesias noted. Diagnoses and all orders for this visit:    Depression, unspecified depression type  Comments:  Upward titrate Remeron to 15 mg at bedtime    Hypothyroidism, unspecified type  Comments:  Controlled with levothyroxine with close observation of TSH    Constipation, unspecified constipation type  Comments:  Controlled with Colace    Chronic atrial fibrillation ([address]  Comments:  Stable with metoprolol    Chronic diastolic CHF (congestive heart failure) ([address]  Comments:  Stable with diuretic management        Plan: Plan of care was discussed with the healthcare team with meds and labs reviewed. Given my concern regarding the depression, I am going to upward titrate her Remeron to 15 mg at bedtime and then furthermore reevaluate her in the course the next couple of weeks. Staff will continue to monitor her in this regard as well, especially given her statement.   She will furthermore continue with her medical regimen as clinically indicated and maintain the benefit of her regular diet with which I will continue to track her intakes, monitor her weights and behaviors, and see her routinely and as needed with further orders forthcoming. GABBI MARCUM, APRN - CNP      *Note was creating using voice recognition software.   The document was reviewed however grammatical errors may exist.

## 2021-07-24 PROBLEM — N17.9 AKI (ACUTE KIDNEY INJURY) (HCC): Status: ACTIVE | Noted: 2021-01-01

## 2021-07-24 PROBLEM — R79.89 ELEVATED BRAIN NATRIURETIC PEPTIDE (BNP) LEVEL: Status: ACTIVE | Noted: 2021-01-01

## 2021-07-24 PROBLEM — J96.01 ACUTE RESPIRATORY FAILURE WITH HYPOXIA (HCC): Status: ACTIVE | Noted: 2021-01-01

## 2021-07-24 PROBLEM — I25.10 CAD (CORONARY ARTERY DISEASE): Status: ACTIVE | Noted: 2020-01-01

## 2021-07-24 PROBLEM — N39.0 UTI (URINARY TRACT INFECTION): Status: ACTIVE | Noted: 2021-01-01

## 2021-07-24 PROBLEM — D64.9 CHRONIC ANEMIA: Status: ACTIVE | Noted: 2021-01-01

## 2021-07-24 PROBLEM — I50.9 CHRONIC CHF (CONGESTIVE HEART FAILURE) (HCC): Status: ACTIVE | Noted: 2021-01-01

## 2021-07-24 PROBLEM — J18.9 MULTIFOCAL PNEUMONIA: Status: ACTIVE | Noted: 2021-01-01

## 2021-07-24 PROBLEM — J90 PLEURAL EFFUSION: Status: ACTIVE | Noted: 2021-01-01

## 2021-07-24 PROBLEM — R74.8 ELEVATED LIVER ENZYMES: Status: ACTIVE | Noted: 2021-01-01

## 2021-07-24 NOTE — ED NOTES
Report faxed. Keke at ext [phone] confirmed receipt. Patient and family updated.  Patient prepared to be transported via cart and [address], RN  07/24/21 1402

## 2021-07-24 NOTE — ED PROVIDER NOTES
[address]      Pt Name: Morelia Urias  MRN: [id number]  [place] 7/4/1924  Date of evaluation: 7/24/2021      CHIEF COMPLAINT       Chief Complaint   Patient presents with    Fatigue     lethargic        HPI  Morelia Urias is a 80 y.o. female who presents to the ED with lethargy and shortness of breath. She presents from a skilled nursing facility where she has had increasing of lethargy for the past several days. She is not on oxygen at home. She has been treated with antibiotics for UTI. Patient has difficulty with speaking, however is oriented x3. She is arousable when spoken to and  denies fever, nausea, vomiting, abdominal pain. Except as noted above the remainder of the review of systems was reviewed and negative. Review of Systems   Constitutional: Positive for fatigue. Negative for chills and fever. HENT: Negative for ear pain and sore throat. Eyes: Negative for pain. Respiratory: Positive for shortness of breath. Cardiovascular: Negative for chest pain. Gastrointestinal: Negative for abdominal pain, diarrhea, nausea and vomiting. Genitourinary: Negative for flank pain. Musculoskeletal: Negative for back pain and neck pain. Skin: Negative for rash. Neurological: Positive for weakness. Negative for headaches. Psychiatric/Behavioral: Negative for behavioral problems. The patient is not nervous/anxious. Physical Exam  Constitutional:       Appearance: She is ill-appearing. HENT:      Head: Normocephalic and atraumatic. Right Ear: External ear normal.      Left Ear: External ear normal.      Nose: Nose normal.      Mouth/Throat:      Mouth: Mucous membranes are dry. Pharynx: No oropharyngeal exudate. Eyes:      Extraocular Movements: Extraocular movements intact. Conjunctiva/sclera: Conjunctivae normal.      Pupils: Pupils are equal, round, and reactive to light. Cardiovascular:      Rate and Rhythm: Normal rate. Pulses: Normal pulses. Pulmonary:      Effort: No respiratory distress. Abdominal:      Palpations: Abdomen is soft. Tenderness: There is no abdominal tenderness. There is no guarding or rebound. Musculoskeletal:         General: No deformity or signs of injury. Cervical back: Neck supple. No rigidity. Skin:     General: Skin is warm and dry. Neurological:      General: No focal deficit present. Mental Status: She is oriented to person, place, and time. Comments: Lethargic   Psychiatric:         Mood and Affect: Mood normal.          Procedures     MDM   Patient is a 66-year-old female who presents with increased lethargy and shortness of breath. She was hypoxic on room air. We ordered breathing treatments, chest x-ray, CTA pulmonary, and sepsis labs. Patient was found to have a BUN of 73 and creatinine of 2.3 so we changed the CT to non contrast. Initial troponin is 265 with a repeat of 259. BNP is 32,475 consistent with heart failure. Covid 19 was not detected. CT of the chest reveals bilateral consolidations consistent with multifocal pneumonia. CT abdomen pelvis showed diffuse diverticulosis and no obstructive uropathy. We started her on cefepime and doxycycline. Patient has a curb 65 score of 3 and will need to be admitted. The admitting Doctor Dr. Kathy Gtz was spoken to and agrees to admit her. CURB-65 CRITERIA FOR PNEUMONIA  1. Confusion no (0)   2. BUN > 19mg/dl yes (1)   3.    RR > 30 / min no (0)   4. SBP < 90 mm Hg or DBP < 60 mm Hg yes (1)   5.     Age > 72Years Old yes (1)    TOTAL 3     SCORE MORTALITY SUGGESTED MANAGEMENT   0 OR 1 1.5%  Likely suitable for home Tx     2   9.2%  Short stay inpatient   -vs-    supervised outpatient Tx   3-5 22% Inpatient, eval for ICU especially if score 4 or 5       ED Course as of Jul 24 1647   Sat Jul 24, 2021   1636 Patient reevaluated and was seen still lethargic, but responsive to questions. She says she feels okay. [TO]      ED Course User Index  [TO] Florentino Lewis DO     EKG: This EKG is signed and interpreted by me. Rate: 62  Rhythm: Ventricular paced rhythm  Interpretation: Left axis deviation, QTC of 452, no ST segment elevations  Comparison: stable as compared to patient's most recent EKG    --------------------------------------------- PAST HISTORY ---------------------------------------------  Past Medical History:  has a past medical history of Acute on chronic congestive heart failure (HCC), Acute respiratory failure with hypoxia (HCC), Arthritis, Atrial fibrillation ([facility] [address] Back pain, CAD (coronary artery disease), Chronic diastolic CHF (congestive heart failure) ([facility] [address] Difficulty walking, Hypertension, Hypothyroidism, Osteoarthritis, Pneumonia due to organism, Rheumatoid arthritis ([address] Spinal stenosis, and Urinary tract infection, acute. Past Surgical History:  has a past surgical history that includes hernia repair (2008); Hysterectomy (1968); joint replacement (1992); joint replacement (2000); Cataract removal (2007); Femur Surgery (5/5/12); Echo Complete (5/7/2012); fracture surgery; Tonsillectomy; Appendectomy; Colonoscopy; eye surgery; pacemaker placement (2009); pacemaker placement (02/21/2017); and Upper gastrointestinal endoscopy (N/A, 11/18/2019). Social History:  reports that she has never smoked. She has never used smokeless tobacco. She reports that she does not drink alcohol and does not use drugs. Family History: family history includes Cancer in her brother, daughter, and sister; Stroke in her father. The patients home medications have been reviewed.     Allergies: Penicillins and Sulfa antibiotics    -------------------------------------------------- RESULTS -------------------------------------------------    LABS:  Results for orders placed or performed during the hospital encounter of 07/24/21 Lactate, Sepsis   Result Value Ref Range    Lactic Acid, Sepsis 2.0 (H) 0.5 - 1.9 mmol/L   Lactate, Sepsis   Result Value Ref Range    Lactic Acid, Sepsis 2.3 (H) 0.5 - 1.9 mmol/L   CBC auto differential   Result Value Ref Range    WBC 10.4 4.5 - 11.5 E9/L    RBC 3.50 3.50 - 5.50 E12/L    Hemoglobin 10.9 (L) 11.5 - 15.5 g/dL    Hematocrit 34.4 34.0 - 48.0 %    MCV 98.3 80.0 - 99.9 fL    MCH 31.1 26.0 - 35.0 pg    MCHC 31.7 (L) 32.0 - 34.5 %    RDW 14.9 11.5 - 15.0 fL    Platelets 580 312 - 832 E9/L    MPV 13.1 (H) 7.0 - 12.0 fL    Neutrophils % 92.2 (H) 43.0 - 80.0 %    Lymphocytes % 3.5 (L) 20.0 - 42.0 %    Monocytes % 2.6 2.0 - 12.0 %    Eosinophils % 0.0 0.0 - 6.0 %    Basophils % 0.0 0.0 - 2.0 %    Neutrophils Absolute 9.78 (H) 1.80 - 7.30 E9/L    Lymphocytes Absolute 0.42 (L) 1.50 - 4.00 E9/L    Monocytes Absolute 0.31 0.10 - 0.95 E9/L    Eosinophils Absolute 0.00 (L) 0.05 - 0.50 E9/L    Basophils Absolute 0.00 0.00 - 0.20 E9/L    Metamyelocytes Relative 1.7 (H) 0.0 - 1.0 %    nRBC 0.0 /100 WBC    Toxic Granulation 1+     Dohle Bodies 1+     Vacuolated Neutrophils 2+     Polychromasia 1+     Poikilocytes 2+     Schistocytes 1+     Mary Cells 2+     Ovalocytes 1+     Basophilic Stippling 1+    Comprehensive Metabolic Panel w/ Reflex to MG   Result Value Ref Range    Sodium 141 132 - 146 mmol/L    Potassium reflex Magnesium 4.8 3.5 - 5.0 mmol/L    Chloride 104 98 - 107 mmol/L    CO2 24 22 - 29 mmol/L    Anion Gap 13 7 - 16 mmol/L    Glucose 143 (H) 74 - 99 mg/dL    BUN 73 (H) 6 - 23 mg/dL    CREATININE 2.3 (H) 0.5 - 1.0 mg/dL    GFR Non-African American 20 >=60 mL/min/1.73    GFR African American 24     Calcium 8.9 8.6 - 10.2 mg/dL    Total Protein 6.7 6.4 - 8.3 g/dL    Albumin 3.0 (L) 3.5 - 5.2 g/dL    Total Bilirubin 0.8 0.0 - 1.2 mg/dL    Alkaline Phosphatase 101 35 - 104 U/L    ALT 41 (H) 0 - 32 U/L    AST 47 (H) 0 - 31 U/L   Urinalysis   Result Value Ref Range    Color, UA DARK YELLOW (A) Straw/Yellow Clarity, UA Clear Clear    Glucose, Ur Negative Negative mg/dL    Bilirubin Urine SMALL (A) Negative    Ketones, Urine Negative Negative mg/dL    Specific Gravity, UA >=1.030 1.005 - 1.030    Blood, Urine TRACE-INTACT Negative    pH, UA 5.0 5.0 - 9.0    Protein, UA 30 (A) Negative mg/dL    Urobilinogen, Urine 1.0 <2.0 E.U./dL    Nitrite, Urine Negative Negative    Leukocyte Esterase, Urine TRACE (A) Negative   Troponin   Result Value Ref Range    Troponin, High Sensitivity 265 (H) 0 - 9 ng/L   Brain Natriuretic Peptide   Result Value Ref Range    Pro-BNP 34,475 (H) 0 - 450 pg/mL   Blood Gas, Arterial   Result Value Ref Range    Date Analyzed [id number]     Time Analyzed 1317     Source: Blood Arterial     pH, Blood Gas 7.437 7.350 - 7.450    PCO2 38.6 35.0 - 45.0 mmHg    PO2 43.4 (L) 75.0 - 100.0 mmHg    HCO3 25.5 22.0 - 26.0 mmol/L    B.E. 1.3 -3.0 - 3.0 mmol/L    O2 Sat 80.0 (L) 92.0 - 98.5 %    O2Hb 79.5 (L) 94.0 - 97.0 %    COHb 0.5 0.0 - 1.5 %    MetHb 0.1 0.0 - 1.5 %    O2 Content 13.5 mL/dL    HHb 19.9 (H) 0.0 - 5.0 %    tHb (est) 12.1 11.5 - 16.5 g/dL    Mode RA     Date Of Collection      Time Collected      Pt Temp 37.0 C     ID [id number]     Lab [id number]     Critical(s) Notified .  No Critical Values    Microscopic Urinalysis   Result Value Ref Range    WBC, UA 5-10 (A) 0 - 5 /HPF    RBC, UA 2-5 0 - 2 /HPF    Epithelial Cells, UA RARE /HPF    Bacteria, UA MANY (A) None Seen /HPF   PROTEIN, URINE, RANDOM   Result Value Ref Range    Protein, Ur 64 (H) 0 - 12 mg/dL   Creatinine, Random Urine   Result Value Ref Range    Creatinine, Ur 154 29 - 226 mg/dL   Urine electrolytes   Result Value Ref Range    Sodium, Ur <20 Not Established mmol/L    Potassium, Ur 79.0 Not Established mmol/L    Chloride <20 Not Established mmol/L   SPECIMEN REJECTION   Result Value Ref Range    Rejected Test trp     Reason for Rejection see below        RADIOLOGY:  CT ABDOMEN PELVIS WO CONTRAST Additional Contrast? None   Final Result Patchy infiltrates in the lung bases more on the right side concerning for   pneumonia with small pleural effusion. Diffuse diverticulosis of the left hemicolon with thickening of the sigmoid   colon likely spasm or uncomplicated diverticulitis. There is no obstructive   uropathy. CT CHEST WO CONTRAST   Final Result   Scattered bilateral consolidations most prominent in the right lung is   concerning for multifocal pneumonia. Follow-up CT of the chest may be   obtained in 6-8 weeks after completion of therapy. XR CHEST 1 VIEW   Final Result   Right upper lobe opacity may represent pulmonary edema or pneumonia in the   proper clinical setting. Consolidation in retrocardiac region may represent atelectasis versus   pneumonia. Further evaluation with CT chest may be obtained if clinically warranted. EKG:  This EKG is signed and interpreted by me. Rate: 62  Rhythm: Ventricular paced rhythm  Interpretation: Left axis deviation, QTC of 452, no ST segment elevation  Comparison: stable as compared to patient's most recent EKG      ------------------------- NURSING NOTES AND VITALS REVIEWED ---------------------------  Date / Time Roomed:  7/24/2021 12:24 PM  ED Bed Assignment:  27/27    The nursing notes within the ED encounter and vital signs as below have been reviewed.      Patient Vitals for the past 24 hrs:   BP Temp Temp src Pulse Resp SpO2 Height Weight   07/24/21 1606 (!) 104/55 -- -- 60 16 92 % -- --   07/24/21 1543 (!) 106/55 -- -- 59 16 92 % -- --   07/24/21 1418 -- -- -- -- 16 -- -- --   07/24/21 1358 118/60 -- -- 59 18 95 % -- --   07/24/21 1249 107/61 98.7 °F (37.1 °C) Oral 61 14 (!) 80 % 5' 4\" (1.626 m) 132 lb (59.9 kg)       Oxygen Saturation Interpretation: Abnormal    ------------------------------------------ PROGRESS NOTES ------------------------------------------    Counseling:  I have spoken with the patient and discussed todays results, in addition to providing specific details for the plan of care and counseling regarding the diagnosis and prognosis. Their questions are answered at this time and they are agreeable with the plan of admission.    --------------------------------- ADDITIONAL PROVIDER NOTES ---------------------------------  Consultations:  Time: 1740. Spoke with Dr. Rosalie Gifford. Discussed case. They will admit the patient. This patient's ED course included: a personal history and physicial examination    This patient has remained hemodynamically stable during their ED course. Diagnosis:  1. Pneumonia of both lower lobes due to infectious organism    2. Elevated troponin        Disposition:  Patient's disposition: Admit to telemetry  Patient's condition is stable.         Justino Woodruff DO  Resident  07/24/21 [id number]

## 2021-07-25 NOTE — H&P
8 HOUR PO), Take 650 mg by mouth every 4 hours as needed   [DISCONTINUED] Glucosamine Sulfate 750 MG TABS, Take 2 tablets by mouth daily. Allergies:  Penicillins and Sulfa antibiotics    Social History:   TOBACCO:   reports that she has never smoked. She has never used smokeless tobacco.  ETOH:   reports no history of alcohol use. Family History:       Problem Relation Age of Onset    Stroke Father     Cancer Sister     Cancer Brother     Cancer Daughter        REVIEW OF SYSTEMS:    General ROS: Answers to name but not following commands  Hematological and Lymphatic ROS: negative  Endocrine ROS: negative  Respiratory ROS: no cough, shortness of breath, or wheezing  Cardiovascular ROS: no chest pain or dyspnea on exertion  Gastrointestinal ROS: no abdominal pain, change in bowel habits, or black or bloody stools  Genito-Urinary ROS: no dysuria, trouble voiding, or hematuria  Neurological ROS: no TIA or stroke symptoms  negative    Vitals:  BP (!) 140/66   Pulse 60   Temp 98 °F (36.7 °C) (Oral)   Resp 18   Ht 5' 4\" (1.626 m)   Wt 133 lb 3.2 oz (60.4 kg)   SpO2 94%   BMI 22.86 kg/m²     PHYSICAL EXAM:  General: Open eyes to name but not following commands nourished, well groomed. No apparent distress. HEENT:  Normocephalic, atraumatic. Pupils equal, round, reactive to light. No scleral icterus. No conjunctival injection. Normal lips, teeth, and gums. No nasal discharge. Neck:  Supple, FROM  Heart:  RRR, no murmurs, gallops, rubs, carotid upstroke normal, no carotid bruits  Lungs:  CTA bilaterally, bilat symmetrical expansion, no wheeze, rales, or rhonchi  Abdomen:   Bowel sounds present, soft, nontender, no masses, no organomegaly, no peritoneal signs  Extremities:  No clubbing, cyanosis, or edema  Skin:  Warm and dry, no open lesions or rash  Neuro:  Cranial nerves 2-12 intact, no focal deficits  Vascular: Radial and pedal Pulses 2+  Breast: deferred  Rectal: deferred  Genitalia: deferred      DATA:     Recent Labs     07/24/21  1318 07/25/21  0322   WBC 10.4 9.3   HGB 10.9* 10.3*    164     Recent Labs     07/24/21  1318 07/25/21  0322    140   K 4.8 4.2   BUN 73* 78*   CREATININE 2.3* 2.2*     Recent Labs     07/24/21  1318 07/25/21  0322   PROT 6.7 6.1*     Recent Labs     07/24/21  1318 07/25/21  0322   AST 47* 44*   ALT 41* 40*   ALKPHOS 101 94   BILITOT 0.8 0.6     No results for input(s): BNP in the last 72 hours. No results for input(s): CKTOTAL, CKMB, CKMBINDEX, TROPONINI in the last 72 hours. ASSESSMENT:      Principal Problem:    Acute respiratory failure with hypoxia (HCC)  Active Problems:    Elevated troponin    Essential hypertension    Paroxysmal atrial fibrillation (HCC)    Sepsis (HCC)    Hypothyroidism    CAD (coronary artery disease)    VALERIE (acute kidney injury) (HCC)    Chronic CHF (congestive heart failure) (HCC)    Elevated brain natriuretic peptide (BNP) level    Multifocal pneumonia    Chronic anemia    Elevated liver enzymes    Pleural effusion  Resolved Problems:    * No resolved hospital problems. *    Assessment:  -Acute hypoxic respiratory failure, likely multifactorial   -In the setting of multifocal pneumonia and possible acute exacerbation of HFpEF (elevated proBNP, troponin), however, she is clinically euvolemic  -Sepsis, 2/2 multifocal pneumonia and UTI  -Multifocal healthcare acquired pneumonia, prominent on the right side  -VALERIE, likely prerenal -possible cardiorenal syndrome  -Elevated troponin  -Mild transaminitis, in the light of CHF exacerbation  -Urinary tract infection  -Hx HFpEF  -Hx CAD  -Hx hypertension  -Hx hypothyroidism  -Hx chronic anemia  -Hx A. fib, pacemaker    PLAN:  -Cardiology consulted, input appreciated  -Follow TTE, last echo as of 2017  -Continue broad-spectrum antibiotic with cefepime and vancomycin given the patient residence at [facility]  -Follow pancultures and procalcitonin  -Nephrology consult, input appreciated. Hold diuresis for now.   Follow ultrasound kidney  -Follow work-up for anemia  -Resume home medications except for losartan      DVT prophylaxis Heparin  PT OT  Discharge plan    Michell Tony MD  7/25/2021  8:37 AM

## 2021-07-25 NOTE — CARE COORDINATION
Met with daughter Chava Duran at the bedside re; d/c planning. States pt has been at [facility] since May. Plan is for pt (per Chava Duran) to return there at discharge. covid test negative. SW/CM can verify status /return in am. Will need transport forms on chart. PT/OT ordered currently on 15L hi maria ed jesus. Pharmacy dosing IV ATB's. Renal to evaluate for VALERIE. Will follow. Travis Arechiga.

## 2021-07-25 NOTE — PROGRESS NOTES
Pharmacy Consultation Note  (Antibiotic Dosing and Monitoring)    Note vancomycin discontinued. Clinical pharmacy will sign-off. Please re-consult if we can be of further assistance.     Thanks for this consult,  Timoteo Landers [facility], PharmD, BCPS  7/25/2021  4:47 PM

## 2021-07-25 NOTE — ED NOTES
Assumed care of patient. Pt lying in bed in no apparent distress. No visitors at bedside.      Charly Mc RN  07/24/21 2018

## 2021-07-25 NOTE — CONSULTS
Inpatient Cardiology Consultation      Reason for Consult:  Elevated troponin and elevated BNP    Consulting Physician: Dr. Newton Rudd    Requesting Physician:  APPLE Newton    Date of Consultation: 7/25/2021    History of Present Illness:    Mrs. Dave Rice is a 80year old lady who was previously under the care of Dr. Btuler at [facility]. Her past medical history includes hypertension, atrial fibrillation, and HFpEF with pleural effusion requiring thoracentesis in 2018. She suffered closed right ankle fracture in May 2021, and since that time has been primarily chair bound and residing in a SNF. She was sent to the emergency room yesterday due to shortness of breath and increasing lethargy, and has been undergoing treatment for UTI. She was hypoxic (SpO2 80, pO2 43.4 on room air) and in VALERIE (BUN 73, Cr 2.3), and hs-Lito levels were 265 and 259, with BNP of 32,475. She was treated with cefepime and doxycycline for pneumonia, and admitted to telemetry. Currently, she is resting in bed and appears ill but in no acute distress. She is unable to provide history and much of the information for the consult is obtained from review of the EMR. Past Medical History:    1. Hypertension. 2. Rheumatoid arthritis and osteoarthritis   3. Hip fracture, 05/2012. 4. Thyroid abnormality.    5. Echocardiogram, 05/07/2012, normal EF. MAC. Mild MR. No wall motion abnormality.    6. Hospital admission, 12/2013, with aphasia.    7. Permanent pacemaker. Implant date, 2008. Pacemaker surveillance documenting atrial fibrillation. 8. History of atrial fibrillation.    9. Hospital admission, 09/2016, with CHF.  Pro BNP 2188. INR 1.5. Troponin 0.01.   10. Pulse generator replacement, 02/21/2017 (Dr. Chloe Avalos). 11. History of gout. 12. Surgeries: Cholecystectomy, partial hysterectomy, bilateral knee replacement, inguinal hernia repair.    13. Chronic anticoagulation (Coumadin). 14. Echo, 10/13/2017.  Mild MAC.  EF 55%.  Paced ventricular rhythm.  LA severely dilated.  Elevated LA pressure. Moderate central MR.  MV leaflets normal.  Mild-moderate AR noted.  PHTN moderate.  No pericardial effusion. 15. Admitted, 12/30/17 through 01/03/18 shortness of breath. She was found have pneumonia and sepsis hypoxia l. Rx IV antibiotics, IV fluid. Diuretics were held initially. Eventually IV fluid was stopped. Diuretics were restarted. thoracentesis for 750 mL removed L side  pBNP~3000  16. Admitted 11/18/2019 to 11/19/20219with melena (she had been changed from warfarin to aspirin ~ two weeks prior). HGB on arrival was 9.6 (from baseline of 13). EGD 11/18/2019 showed multiple gastric ulcers without active bleeding. 17. Right ankle fracture 5/2021  18. Dementia   19. Depression     Medications Prior to Admit:  Prior to Admission medications    Medication Sig Start Date End Date Taking? Authorizing Provider   docusate sodium (COLACE) 100 MG capsule Take 100 mg by mouth 2 times daily   Yes Historical Provider, MD   memantine (NAMENDA) 10 MG tablet Take 10 mg by mouth 2 times daily   Yes Historical Provider, MD   mirtazapine (REMERON) 15 MG tablet Take 15 mg by mouth nightly   Yes Historical Provider, MD   traMADol (ULTRAM) 50 MG tablet Take 50 mg by mouth nightly.    Yes Historical Provider, MD   pantoprazole (PROTONIX) 40 MG tablet Take 1 tablet by mouth 2 times daily (before meals) 11/19/19  Yes Isrrael Humphreys,    donepezil (ARICEPT) 5 MG tablet Take 15 mg by mouth daily  6/5/19  Yes Historical Provider, MD   furosemide (LASIX) 20 MG tablet Take 2 tablets by mouth 2 times daily  Patient taking differently: Take 40 mg by mouth daily  2/1/18  Yes Isrrael Humphreys DO   isosorbide mononitrate (IMDUR) 30 MG extended release tablet Take 1 tablet by mouth daily 11/20/17  Yes Isrrael Humphreys, DO   metoprolol tartrate (LOPRESSOR) 25 MG tablet Take 2 tablets by mouth 2 times daily 11/20/17  Yes Isrrael Humphreys, DO   losartan (COZAAR) 100 MG tablet Take 100 mg by mouth every morning   Yes Historical Provider, MD   potassium chloride SA (K-DUR;KLOR-CON M) 10 MEQ tablet Take 1 tablet by mouth daily (with breakfast). 12/21/13  Yes Enio Lamas MD   levothyroxine (SYNTHROID) 150 MCG tablet Take 75 mcg by mouth Daily    Yes Historical Provider, MD       Current Medications:    Current Facility-Administered Medications: sodium chloride flush 0.9 % injection 5-40 mL, 5-40 mL, Intravenous, 2 times per day  sodium chloride flush 0.9 % injection 5-40 mL, 5-40 mL, Intravenous, PRN  0.9 % sodium chloride infusion, 25 mL, Intravenous, PRN  heparin (porcine) injection 5,000 Units, 5,000 Units, Subcutaneous, 3 times per day  polyethylene glycol (GLYCOLAX) packet 17 g, 17 g, Oral, Daily PRN  trimethobenzamide (TIGAN) injection 200 mg, 200 mg, Intramuscular, Q6H PRN  albuterol sulfate  (90 Base) MCG/ACT inhaler 2 puff, 2 puff, Inhalation, Q6H  cefepime (MAXIPIME) 1000 mg IVPB minibag, 1,000 mg, Intravenous, Q24H  donepezil (ARICEPT) tablet 5 mg, 5 mg, Oral, Daily  gabapentin (NEURONTIN) capsule 100 mg, 100 mg, Oral, Nightly  isosorbide mononitrate (IMDUR) extended release tablet 30 mg, 30 mg, Oral, Daily  levothyroxine (SYNTHROID) tablet 50 mcg, 50 mcg, Oral, Nightly  metoprolol tartrate (LOPRESSOR) tablet 100 mg, 100 mg, Oral, BID  pantoprazole (PROTONIX) tablet 40 mg, 40 mg, Oral, BID AC  perflutren lipid microspheres (DEFINITY) injection 1.65 mg, 1.5 mL, Intravenous, ONCE PRN  lactated ringers infusion, , Intravenous, Continuous    Allergies:  Penicillins and Sulfa antibiotics    Social History: There is no history of tobacco, alcohol, or illicit drug use. She is a retired .      Family History:   Noncontributory due to her advanced age    Review of Systems: Unobtainable due to her cognitive status    Physical Exam:   BP (!) 140/66   Pulse 60   Temp 98 °F (36.7 °C) (Oral)   Resp 18   Ht 5' 4\" (1.626 m)   Wt 133 lb 3.2 oz (60.4 kg)   SpO2 94%   BMI 22.86 kg/m²   CONST:  Thin frail appearing elderly lady who appears of stated age. Awake but does not provide significant history. Sick looking but in no acute distress. HEENT:   Head- Normocephalic, atraumatic   Throat- Oral mucosa pink and dry   Neck-  No stridor, jugular venous distention or carotid bruit. CHEST: Chest symmetrical and non-tender to palpation. Respirations unlabored. RESPIRATORY:  Breath sounds clear but diminished (poor inspiratory effort)  CARDIOVASCULAR:     Heart Ausculation- Regular rate and rhythm, no murmur, s3, s4 or rub   PV: No lower extremity edema. No varicosities. Feet warm to touch. ABDOMEN: Soft, non-tender to light palpation. Bowel sounds present. No palpable masses   MS: Skeletal muscle atrophy   : Deferred  SKIN: Warm and dry   NEURO / PSYCH: Unable to assess orientation      Telemetry: Ventricular pacing  ECG: Ventricular pacing       Intake/Output Summary (Last 24 hours) at 7/25/2021 0659  Last data filed at 7/25/2021 0349  Gross per 24 hour   Intake 100 ml   Output 550 ml   Net -450 ml       Labs:   CBC:   Recent Labs     07/24/21  1318 07/25/21  0322   WBC 10.4 9.3   HGB 10.9* 10.3*   HCT 34.4 32.6*    164     BMP:   Recent Labs     07/24/21  1318 07/25/21  0322    140   K 4.8 4.2   CO2 24 29   BUN 73* 78*   CREATININE 2.3* 2.2*   LABGLOM 20 21   CALCIUM 8.9 8.8     TSH:   Recent Labs     07/25/21  0322   TSH 0.498     proBNP:   Recent Labs     07/24/21  1318   PROBNP 34,475*     FASTING LIPID PANEL:  Lab Results   Component Value Date    CHOL 162 12/20/2013    HDL 61 12/20/2013    LDLCALC 83 12/20/2013    TRIG 88 12/20/2013     LIVER PROFILE:  Recent Labs     07/24/21  1318 07/25/21  0322   AST 47* 44*   ALT 41* 40*   LABALBU 3.0* 2.7*     CT abdomen and pelvis without contrast 7/24/2021:  Impression:     Patchy infiltrates in the lung bases more on the right side concerning for pneumonia with small pleural effusion.    Diffuse diverticulosis of the left hemicolon with thickening of the sigmoid colon likely spasm or uncomplicated diverticulitis. Juris Lizbeth is no obstructive uropathy. CT chest without contrast 7/24/2021:  Impression:     Scattered bilateral consolidations most prominent in the right lung is concerning for multifocal pneumonia.  Follow-up CT of the chest may be obtained in 6-8 weeks after completion of therapy. CXR 7/24/2021:  Impression:     Right upper lobe opacity may represent pulmonary edema or pneumonia in the proper clinical setting. Consolidation in retrocardiac region may represent atelectasis versus pneumonia. Assessment:  1. Elevated troponin  · 265>>259  · In setting of VALERIE and hypoxia  · Unable to obtain history to assess for chest pain     2. Acute hypoxic respiratory failure    3. Multifocal pneumonia   · Rapid COVID testing negative     4. Elevated proBNP: does not appear to be in heart failure clinically     5. VALERIE     6. Permanent AF  · Not on anticoagulation due to past GI bleed  · S/p pacemaker    7. Dementia    8. DNR-CCA status     Treatment Plan:  1. Continue Lopressor  and Imdur    2. Will defer on aspirin due to anemia and previous GI bleed    3. Will defer on statin due to elevated LFTs; may reassess later    4. Hold Cozaar for now due to VALERIE    5. Echocardiogram ordered and pending    6. Monitor BMP    7. Further recommendations to follow pending results of above and her clinical course. The above assessment and treatment plan was made in collaboration with Dr. Pamela Carpenter. Electronically signed by MARLEN Vick on 7/25/2021 at 6:59 AM     I independently performed an evaluation on the patient. I have reviewed the above documentation completed by the advance practitioner. Please see my additional contributions to the HPI, physical exam, assessment and medical decision making.     Physical Exam   BP (!) 140/66   Pulse 60   Temp 98 °F (36.7 °C) (Oral)   Resp 18   Ht 5' 4\" (1.626 m)   Wt 133 lb 3.2 oz (60.4 kg)   SpO2 94%   BMI 22.86 kg/m²   Constitutional: Oriented to person, place, and time. Well-developed and well-nourished. No distress. Head: Normocephalic and atraumatic. Eyes: EOM are normal. Pupils are equal, round, and reactive to light. Neck: Normal range of motion. Neck supple. No hepatojugular reflux and no JVD present. Carotid bruit is not present. No tracheal deviation present. No thyromegaly present. Cardiovascular: Normal rate, regular rhythm, normal heart sounds and intact distal pulses. Exam reveals no gallop and no friction rub. No murmur heard. Pulmonary/Chest: Effort normal and breath sounds normal. No respiratory distress. No wheezes. No rales. No tenderness. Abdominal: Soft. Bowel sounds are normal. No distension and no mass. No tenderness. No rebound and no guarding. Musculoskeletal: Normal range of motion. No edema and no tenderness. Lymphadenopathy:   No cervical adenopathy. Neurological: Alert and oriented to person, place, and time. Skin: Skin is warm and dry. No rash noted. Not diaphoretic. No erythema. Psychiatric: Normal mood and affect. Behavior is normal.     See accompanying documentation for full consult. ASSESSMENT AND PLAN:  1. Elevated troponin    No overt CP or angina. Significant renal issues presently. Echo ordered. Medically manage. BB/imdur. No ASA to this point due to GIB issues in past.     2. Elevated BNP/SOB/Hypoxia/Pneumonia/UTI:    Echo ordered. COVID negative. Per primary service. 3. VALERIE: Follow labs. Renal to see. 4. Chronic Afib: V paced. No anticoagulation to GIB and bleeding risk. 5. Pacer: Followed by [facility]    6. HTN: Observe. 7. RA    8. Gout    9. Dementia     10. DNR-CCA    Yobani Cantu D.O.   Cardiologist  Cardiology, [address] [place] [address]

## 2021-07-25 NOTE — CONSULTS
Nephrology Consult Note    Patient's Name: Pilar Perla  11:31 AM  7/25/2021    Nephrologist: Dr. Chikis Quintanilla MD    Reason for Consult: Renal is consulted for acute kidney injury  Requesting Physician:  Celestine Dubon DO    Chief Complaint: Shortness of breath    History Obtained From: Patient's daughterand EMR    History of Present Ilness:    Pilar Perla is a 80 y.o. female with past medical history of hypertension, hypothyroidism, coronary artery disease, atrial fibrillation with a pacemaker, congestive heart failure with preserved ejection fraction, presented from his skilled nursing facility for severe lethargy, weakness and hypoxia. In the ER she was hypoxic, labs revealed a creatinine of 2.2, BUN of 78, elevated troponin, lactic acidosis with a lactate of 2.3, elevated procalcitonin, CT chest without IV contrast showed right lung multifocal pneumonia, CT abdomen and pelvis showed no evidence of hydronephrosis, and a left adrenal nodule measuring 2.3 cm. Previous labs in our system from 2019 show a normal creatinine of 0.7-0.8  Pertinent home medications include losartan 100 mg p.o. daily, furosemide 40 mg p.o. twice daily, potassium chloride 10 mEq p.o. daily.     As per the daughter at bedside she was not eating much over the past few weeks  She was recently started on doxycycline for a urinary tract infection  Her blood pressure on admission was low around 100/48    Past Medical History:   Diagnosis Date    Acute on chronic congestive heart failure (HCC)     Acute respiratory failure with hypoxia ([address] 12/31/2017    VALERIE (acute kidney injury) ([address] 7/24/2021    Arthritis     Atrial fibrillation (HCC)     Back pain 4-5 years    CAD (coronary artery disease)     Chronic diastolic CHF (congestive heart failure) ([address] 11/17/2017    Difficulty walking 4-5 years    Hypertension     Hypothyroidism     Osteoarthritis     Pneumonia due to organism 12/30/2017    Rheumatoid arthritis ([address] 5/4/2012    Spinal stenosis     Urinary tract infection, acute        Past Surgical History:   Procedure Laterality Date    APPENDECTOMY      CATARACT REMOVAL  2007    COLONOSCOPY      ECHO COMPLETE  5/7/2012         EYE SURGERY      FEMUR SURGERY  5/5/12     IM DEMETRIUS LEFT FEMUR    FRACTURE SURGERY      HERNIA REPAIR  2008    HYSTERECTOMY  1968    JOINT REPLACEMENT  1992    left knee    JOINT REPLACEMENT  2000    right knee    PACEMAKER PLACEMENT  2009    PACEMAKER PLACEMENT  02/21/2017    Dual chamber [employer]onic pgr    TONSILLECTOMY      UPPER GASTROINTESTINAL ENDOSCOPY N/A 11/18/2019    EGD BIOPSY performed by Cindy Wang MD at [facility] ENDOSCOPY       Family History   Problem Relation Age of Onset    Stroke Father     Cancer Sister     Cancer Brother     Cancer Daughter         reports that she has never smoked. She has never used smokeless tobacco. She reports that she does not drink alcohol and does not use drugs.     Allergies:  Penicillins and Sulfa antibiotics    Current Medications:    [START ON 7/26/2021] vancomycin (VANCOCIN) intermittent dosing (placeholder), RX Placeholder  sodium chloride flush 0.9 % injection 5-40 mL, 2 times per day  sodium chloride flush 0.9 % injection 5-40 mL, PRN  0.9 % sodium chloride infusion, PRN  heparin (porcine) injection 5,000 Units, 3 times per day  polyethylene glycol (GLYCOLAX) packet 17 g, Daily PRN  trimethobenzamide (TIGAN) injection 200 mg, Q6H PRN  albuterol sulfate  (90 Base) MCG/ACT inhaler 2 puff, Q6H  cefepime (MAXIPIME) 1000 mg IVPB minibag, Q24H  donepezil (ARICEPT) tablet 5 mg, Daily  gabapentin (NEURONTIN) capsule 100 mg, Nightly  isosorbide mononitrate (IMDUR) extended release tablet 30 mg, Daily  levothyroxine (SYNTHROID) tablet 50 mcg, Nightly  metoprolol tartrate (LOPRESSOR) tablet 100 mg, BID  pantoprazole (PROTONIX) tablet 40 mg, BID AC  perflutren lipid microspheres (DEFINITY) injection 1.65 mg, ONCE PRN  lactated ringers infusion, 21 07/25/2021    GLUCOSE 193 07/25/2021    GLUCOSE 99 05/31/2012     Calcium:    Lab Results   Component Value Date    CALCIUM 8.8 07/25/2021     Phosphorus:    Lab Results   Component Value Date    PHOS 2.9 01/02/2018     Uric Acid:  No results found for: URICACID  U/A:    Lab Results   Component Value Date    NITRU Negative 07/24/2021    COLORU DARK YELLOW 07/24/2021    PHUR 5.0 07/24/2021    WBCUA 5-10 07/24/2021    WBCUA >20 05/25/2012    RBCUA 2-5 07/24/2021    RBCUA 0-1 12/18/2013    BACTERIA MANY 07/24/2021    CLARITYU Clear 07/24/2021    SPECGRAV >=1.030 07/24/2021    LEUKOCYTESUR TRACE 07/24/2021    UROBILINOGEN 1.0 07/24/2021    BILIRUBINUR SMALL 07/24/2021    BILIRUBINUR NEGATIVE 05/25/2012    BLOODU TRACE-INTACT 07/24/2021    GLUCOSEU Negative 07/24/2021    GLUCOSEU NEGATIVE 05/25/2012    KETUA Negative 07/24/2021        Imaging:  CT ABDOMEN PELVIS WO CONTRAST Additional Contrast? None    Result Date: 7/24/2021  EXAMINATION: CT OF THE ABDOMEN AND PELVIS WITHOUT CONTRAST 7/24/2021 3:15 pm TECHNIQUE: CT of the abdomen and pelvis was performed without the administration of intravenous contrast. Multiplanar reformatted images are provided for review. Dose modulation, iterative reconstruction, and/or weight based adjustment of the mA/kV was utilized to reduce the radiation dose to as low as reasonably achievable. COMPARISON: None. HISTORY: ORDERING SYSTEM PROVIDED HISTORY: elevated creatinin TECHNOLOGIST PROVIDED HISTORY: Reason for exam:->elevated creatinin Additional Contrast?->None Decision Support Exception - unselect if not a suspected or confirmed emergency medical condition->Emergency Medical Condition (MA) FINDINGS: Lung bases demonstrate patchy infiltrates in the lungs bilaterally more on the right upper lobe and right lower lobe and minimally in the left base concerning for pneumonia. There is cardiomegaly with coronary artery calcification. Grossly, the liver is of normal architecture. Gallbladder is contracted with mild wall thickening. Please correlate with ultrasonography. Spleen, pancreas, and the right adrenal gland appear normal.  2.3 x 2.4 cm left adrenal nodule is present. Kidneys are grossly within normal limits. There is calcification aorta and severe degenerative changes in the lumbar spine. Pelvis. The bladder is contracted with a Bang catheter and wall thickening. There is diffuse diverticulosis of the descending and sigmoid colon with thickening of the sigmoid colon. There is constipation. Patchy infiltrates in the lung bases more on the right side concerning for pneumonia with small pleural effusion. Diffuse diverticulosis of the left hemicolon with thickening of the sigmoid colon likely spasm or uncomplicated diverticulitis. There is no obstructive uropathy. CT CHEST WO CONTRAST    Result Date: 7/24/2021  EXAMINATION: CT OF THE CHEST WITHOUT CONTRAST 7/24/2021 3:15 pm TECHNIQUE: CT of the chest was performed without the administration of intravenous contrast. Multiplanar reformatted images are provided for review. Dose modulation, iterative reconstruction, and/or weight based adjustment of the mA/kV was utilized to reduce the radiation dose to as low as reasonably achievable. COMPARISON: Chest radiograph same day 2 hour prior CT chest September 7, 2016 HISTORY: ORDERING SYSTEM PROVIDED HISTORY: SOB TECHNOLOGIST PROVIDED HISTORY: Reason for exam:->SOB FINDINGS: Mediastinum: No abnormal lymphadenopathy. The pulmonary trunk is normal in size. Cardiomegaly. Lungs/pleura:   Patchy consolidation seen in the bilateral lungs, right greater than left. There are air bronchograms identified. No pleural effusion. No pneumothorax. Upper Abdomen: No acute process identified Soft Tissues/Bones: No aggressive osseous lesions. Scattered bilateral consolidations most prominent in the right lung is concerning for multifocal pneumonia.   Follow-up CT of the chest may be obtained in 6-8 weeks after completion of therapy. XR CHEST 1 VIEW    Result Date: 7/24/2021  EXAMINATION: ONE XRAY VIEW OF THE CHEST 7/24/2021 1:25 pm COMPARISON: Chest radiograph June 21, 2019 HISTORY: ORDERING SYSTEM PROVIDED HISTORY: chest pain TECHNOLOGIST PROVIDED HISTORY: Reason for exam:->chest pain FINDINGS: Left-sided cardiac pacing device identified. Cardiac silhouette is enlarged but stable in size. There is focal right upper lobe opacity. Retrocardiac opacity also identified. No sizable pleural effusions. No pneumothorax. Right upper lobe opacity may represent pulmonary edema or pneumonia in the proper clinical setting. Consolidation in retrocardiac region may represent atelectasis versus pneumonia. Further evaluation with CT chest may be obtained if clinically warranted. Assessment  1. Acute kidney injury secondary to severe prerenal azotemia from sepsis secondary to multifocal pneumonia and a urinary tract infection, continued use of Lasix, spironolactone and losartan in this setting. Urine electrolytes reviewed and calculated fractional excretion of sodium is 0.2% strongly consistent with prerenal injury  2. Severe sepsis secondary to multifocal pneumonia and a urinary tract infection  3. Congestive heart failure with preserved ejection fraction, appears well compensated on exam  4. Elevated troponin    Plan  1. She has no signs of volume overload on exam, CT chest results reviewed, continue with IV lactated Ringer's at 75 mL/h  2. Spironolactone, losartan and Lasix are on hold  3. CT abdomen and pelvis results reviewed there is no evidence of hydronephrosis  4.  Continue to monitor the renal function and urine output closely, I had an extensive discussion with the patient's daughter present at bedside, dialysis is not recommended at her age if there is no sign of renal recovery and the patient's family  do not want dialysis if needed if there is no improvement in renal function with medical management      Thank you Dr. Vinicio Goddard DO for allowing us to participate in care of Esperanza Buys           Electronically signed by Jessie Fischer MD on 7/25/2021 at 11:31 AM

## 2021-07-25 NOTE — CONSULTS
anticoagulation to GIB and bleeding risk. 5. Pacer: Followed by [facility]    6. HTN: Observe. 7. RA    8. Gout    9. Dementia     10. DNR-CCA    Johann Tomas D.O.   Cardiologist  Cardiology, [facility]

## 2021-07-25 NOTE — CONSULTS
[address] [place] [address] [facility]    Consultation Note     Admit Date: 7/24/2021 12:24 PM    Reason for Consult:   Legionella pneumonia    Attending Physician:  Mikel Sal MD     Chief Complaint: Shortness of breath    HISTORY OF PRESENT ILLNESS:   The patient is a 80 y.o.  woman who is a poor historian and also auditory insufficiency was admitted through the emergency room and is not known to the Infectious Diseases service. The patient was being treated for UTI  At the extended care facility. Last couple weeks the patient has been very weak and decreased function. In the ED she had a creatinine of 2.2 and a white count that was 10. CT scan showed bilateral consolidation. Patient was started on cefepime Vanco and had 1 dose of doxycycline. She was also started on fluid resuscitation. proBNP was 35,000 although as mentioned her BUN and creatinine 73 and 2.3. Urine for Legionella was sent and was positive and she was started on Levaquin. ID was consulted. Patient is from an extended care facility.             Past Medical History:        Diagnosis Date    Acute on chronic congestive heart failure ([address]     Acute respiratory failure with hypoxia ([address] 12/31/2017    VALERIE (acute kidney injury) ([address] 7/24/2021    Arthritis     Atrial fibrillation (HCC)     Back pain 4-5 years    CAD (coronary artery disease)     Chronic diastolic CHF (congestive heart failure) ([address] 11/17/2017    Difficulty walking 4-5 years    Hypertension     Hypothyroidism     Osteoarthritis     Pneumonia due to organism 12/30/2017    Rheumatoid arthritis ([address] 5/4/2012    Spinal stenosis     Urinary tract infection, acute      Past Surgical History:        Procedure Laterality Date    APPENDECTOMY      CATARACT REMOVAL  2007    COLONOSCOPY      ECHO COMPLETE  5/7/2012         EYE SURGERY      FEMUR SURGERY  5/5/12     IM DEMETRIUS LEFT FEMUR    FRACTURE SURGERY      HERNIA REPAIR  2008    HYSTERECTOMY  1968    JOINT REPLACEMENT  1992    left knee    JOINT REPLACEMENT  2000    right knee    PACEMAKER PLACEMENT  2009    PACEMAKER PLACEMENT  02/21/2017    Dual chamber Medtronic pgr    TONSILLECTOMY      UPPER GASTROINTESTINAL ENDOSCOPY N/A 11/18/2019    EGD BIOPSY performed by Rufus Madison MD at [facility]     Current Medications:   Scheduled Meds:   meropenem  500 mg Intravenous Q12H    azithromycin  500 mg Intravenous Q24H    sodium chloride flush  5-40 mL Intravenous 2 times per day    heparin (porcine)  5,000 Units Subcutaneous 3 times per day    albuterol sulfate HFA  2 puff Inhalation Q6H    donepezil  5 mg Oral Daily    gabapentin  100 mg Oral Nightly    isosorbide mononitrate  30 mg Oral Daily    levothyroxine  50 mcg Oral Nightly    metoprolol tartrate  100 mg Oral BID    pantoprazole  40 mg Oral BID AC     Continuous Infusions:   sodium chloride      sodium chloride      lactated ringers 75 mL/hr at 07/25/21 1147     PRN Meds:sodium chloride flush, sodium chloride, polyethylene glycol, trimethobenzamide, perflutren lipid microspheres    Allergies:  Penicillins and Sulfa antibiotics    Social History:   Social History     Socioeconomic History    Marital status:      Spouse name: None    Number of children: None    Years of education: None    Highest education level: None   Occupational History    Occupation: retired    Tobacco Use    Smoking status: Never Smoker    Smokeless tobacco: Never Used   Vaping Use    Vaping Use: Never used   Substance and Sexual Activity    Alcohol use: No    Drug use: No    Sexual activity: Not Currently     Partners: Male   Other Topics Concern    None   Social History Narrative    None     Social Determinants of Health     Financial Resource Strain:     Difficulty of Paying Living Expenses:    Food Insecurity:     Worried About Running Out of Food in the Last Year:     Ran Out of Food in the Last Year: Resp 20   Ht 5' 4\" (1.626 m)   Wt 133 lb 3.2 oz (60.4 kg)   SpO2 96%   BMI 22.86 kg/m²   Constitutional: The patient is awake, very weak and poor historian  Skin: Warm and dry. No rashes were noted. No jaundice. HEENT: Eyes show round, and reactive pupils. Moist mucous membranes, no ulcerations, no thrush. Neck: Supple to movements. No lymphadenopathy. Chest: No use of accessory muscles to breathe. Symmetrical expansion. Auscultation reveals no wheezing, crackles, or rhonchi. Poor overall air exchange  Cardiovascular: S1 and S2 are rhythmic and regular. No murmurs appreciated. Abdomen: Positive bowel sounds to auscultation. Benign to palpation. No masses felt. No hepatosplenomegaly. Genitourinary: Bang in place  Extremities: No clubbing, no cyanosis, no edema and decreased turgor. Musculoskeletal: Equal and symmetrical  Neurological: No focal  Lines: peripheral      CBC+dif:  Recent Labs     07/24/21  1318 07/24/21  1318 07/25/21  0322   WBC 10.4  --  9.3   HGB 10.9*   < > 10.3*   HCT 34.4   < > 32.6*   MCV 98.3   < > 100.0*      < > 164   NEUTROABS 9.78*  --   --     < > = values in this interval not displayed.      No results found for: CRP  No results found for: CRPHS  No results found for: SEDRATE  Lab Results   Component Value Date    ALT 40 (H) 07/25/2021    AST 44 (H) 07/25/2021    ALKPHOS 94 07/25/2021    BILITOT 0.6 07/25/2021     Lab Results   Component Value Date     07/25/2021    K 4.2 07/25/2021     07/25/2021    CO2 29 07/25/2021    BUN 78 07/25/2021    CREATININE 2.2 07/25/2021    GFRAA 25 07/25/2021    LABGLOM 21 07/25/2021    GLUCOSE 193 07/25/2021    GLUCOSE 99 05/31/2012    PROT 6.1 07/25/2021    LABALBU 2.7 07/25/2021    LABALBU 2.6 05/16/2012    CALCIUM 8.8 07/25/2021    BILITOT 0.6 07/25/2021    ALKPHOS 94 07/25/2021    AST 44 07/25/2021    ALT 40 07/25/2021       Lab Results   Component Value Date    PROTIME 14.5 11/18/2019    PROTIME 18.0 05/31/2012    INR 1.3 11/18/2019       Lab Results   Component Value Date    TSH 0.498 07/25/2021       Lab Results   Component Value Date    COLORU DARK YELLOW 07/24/2021    PHUR 5.0 07/24/2021    WBCUA 5-10 07/24/2021    WBCUA >20 05/25/2012    RBCUA 2-5 07/24/2021    RBCUA 0-1 12/18/2013    BACTERIA MANY 07/24/2021    CLARITYU Clear 07/24/2021    SPECGRAV >=1.030 07/24/2021    LEUKOCYTESUR TRACE 07/24/2021    UROBILINOGEN 1.0 07/24/2021    BILIRUBINUR SMALL 07/24/2021    BILIRUBINUR NEGATIVE 05/25/2012    BLOODU TRACE-INTACT 07/24/2021    GLUCOSEU Negative 07/24/2021    GLUCOSEU NEGATIVE 05/25/2012       Lab Results   Component Value Date    D4GHGANO 99.1 05/06/2012     Radiology:  CT ABDOMEN PELVIS WO CONTRAST Additional Contrast? None   Final Result   Patchy infiltrates in the lung bases more on the right side concerning for   pneumonia with small pleural effusion. Diffuse diverticulosis of the left hemicolon with thickening of the sigmoid   colon likely spasm or uncomplicated diverticulitis. There is no obstructive   uropathy. CT CHEST WO CONTRAST   Final Result   Scattered bilateral consolidations most prominent in the right lung is   concerning for multifocal pneumonia. Follow-up CT of the chest may be   obtained in 6-8 weeks after completion of therapy. XR CHEST 1 VIEW   Final Result   Right upper lobe opacity may represent pulmonary edema or pneumonia in the   proper clinical setting. Consolidation in retrocardiac region may represent atelectasis versus   pneumonia. Further evaluation with CT chest may be obtained if clinically warranted. US RETROPERITONEAL COMPLETE    (Results Pending)       Microbiology:  Pending  No results for input(s): BC in the last 72 hours. Recent Labs     07/24/21  1318 07/25/21  0620   ORG Gram negative nora* Presumptive Legionella Urine Antigen*     No results for input(s): BLOODCULT2 in the last 72 hours.   Recent Labs     07/25/21  0620   STREPNEUMAGU Presumptive negative- suggests no current or recent  pneumococcal infection. Infection due to Strep pneumoniae cannot be  ruled out since the antigen present in the sample  may be below the detection limit of the test.  Normal Range:Presumptive Negative       Recent Labs     07/25/21  0620   LP1UAG Presumptive POSITIVE for Legionella pneumophila  serogroup 1 Antigen in urine, suggesting  current or past infection. Normal Range: Presumptive Negative       No results for input(s): ASO in the last 72 hours. No results for input(s): CULTRESP in the last 72 hours. Assessment:  · Convinced this is Legionella pneumonia in a patient from a nursing home. If it is Legionella than the nursing home's water supply needs to be checked otherwise it appears to me more like aspiration    Plan:    · Cont Zithromax which can guarantee better CHELO is because of this patient's renal insufficiency recent article showed of the outcome was not much different and Levaquin  · Meropenem -possible aspiration  · Repeatedly urine for Legionella antigen check the original urine as well  · Check cultures  · Baseline ESR, CRP  · Monitor labs  · Will follow with you    Thank you for having us see this patient in consultation. I will be discussing this case with the treating physicians.       Electronically signed by Ginny Andrew MD on 7/25/2021 at 4:04 PM

## 2021-07-26 NOTE — PROGRESS NOTES
Occupational Therapy  OCCUPATIONAL THERAPY INITIAL EVALUATION      Date:2021  Patient Name: Kimberly Lorenzo  MRN: [id number]  : 1924  Room: 76564486    OCCUPATIONAL THERAPY INITIAL EVALUATION    [facility] [place], [place]         Date:2021                                                  Patient Name: Kimberly Lorenzo    MRN: [id number]    : 1924    Room: 54853494      Evaluating OT: Tracee Amaya OTR/L   [id number]      Referring Provider:MARLEN Newton CNP    Specific Provider Orders/Date:OT eval and treat 2021      Diagnosis: acute resp failure with hypoxia       Pertinent Medical History: CHF, dementia, RA, pacemaker, spinal stenosis - per daughter - patient fell   years ago and injured L shoulder and has been painful since      Precautions:  Fall Risk, alarm, [place], L shoulder rotator injury/painful, high flow O2      Assessment of current deficits    [x] Functional mobility  [x]ADLs  [x] Strength               [x]Cognition    [x] Functional transfers   [x] IADLs         [x] Safety Awareness   [x]Endurance    [] Fine Coordination              [x] Balance      [] Vision/perception   []Sensation     []Gross Motor Coordination  [] ROM  [] Delirium                   [] Motor Control     OT PLAN OF CARE   OT POC based on physician orders, patient diagnosis and results of clinical assessment    Frequency/Duration  2-4 days/wk for 2 weeks PRN   Specific OT Treatment Interventions to include:   ADL retraining/adapted techniques and AE recommendations to increase functional independence within precautions                    Energy conservation techniques to improve tolerance for selfcare routine   Functional transfer/mobility training/DME recommendations for increased independence, safety and fall prevention         Patient/family education to increase safety and functional independence             Environmental modifications for safe mobility and completion of ADLs                             Therapeutic activity to improve functional performance during ADLs. Therapeutic exercise to improve tolerance and functional strength for ADLs    Balance retraining/tolerance tasks for facilitation of postural control with dynamic challenges during ADLs . Positioning to improve functional independence    Recommended Adaptive Equipment: TBD     SOCIAL:  Patient's daughter reports - patient has been in [facility] since end of May. Had assist with ADLs, walking short distances with walker and assist   Prior: Home Living: Pt lives ,, 1 floor condo.  would assist with ADLs, ambulated with walker       Pain Level: L shoulder and back   Cognition: A&O: pleasant following commands   Memory:  Forgetful    Sequencing:  Fair    Problem solving:  Fair    Judgement/safety:  Fair      Functional Assessment:  AM-PAC Daily Activity Raw Score: 11/24   Initial Eval Status  Date: 7/26/21 Treatment Status  Date: STGs = LTGs  Time frame: 10-14 days   Feeding Mod A  Daughter assisting with holding cup and bringing it to patient's mouth   SBA/set-up    Grooming Max A,seated   Decrease standing balance/tolerance   Min A   UB Dressing Max A   Min A   LB Dressing Max A/dependent   Mod a    Bathing Max  A  Mod A    Toileting Dependent   Mod A    Bed Mobility  Mod A  Supine to sit   Min A   Functional Transfers Max Ax2  Sit-stand from bed , chair   Mod A    Functional Mobility Mod Ax2  SPT from bed to chair only  Overall decrease balance/tolerance   Mod A  with good tolerance    Balance Sitting:     Static:  CGA/SBA- EOB     Dynamic:Max  A  Standing:  Max A   Supervision - sititng   Standing - Min A   Activity Tolerance Fair - with light activity   Patient on 15 L O2   O2 sats ranged  Lowest 88% briefly  To 91-92%   Left sitting in chair 93%   No signs or complaints of SOB   Fair + with ADL activity

## 2021-07-26 NOTE — PROGRESS NOTES
Nephrology progress Note    Patient's Name: Will Sequeira  10:14 AM  7/26/2021    Nephrologist: Dr. Gisel Davey MD    Reason for Consult: Renal is consulted for acute kidney injury  Requesting Physician:  Rigo Le DO    Chief Complaint: Shortness of breath    History Obtained From: Patient's daughterand EMR    History of Present Ilness:    Will Sequeira is a 80 y.o. female with past medical history of hypertension, hypothyroidism, coronary artery disease, atrial fibrillation with a pacemaker, congestive heart failure with preserved ejection fraction, presented from his skilled nursing facility for severe lethargy, weakness and hypoxia. In the ER she was hypoxic, labs revealed a creatinine of 2.2, BUN of 78, elevated troponin, lactic acidosis with a lactate of 2.3, elevated procalcitonin, CT chest without IV contrast showed right lung multifocal pneumonia, CT abdomen and pelvis showed no evidence of hydronephrosis, and a left adrenal nodule measuring 2.3 cm. Previous labs in our system from 2019 show a normal creatinine of 0.7-0.8  Pertinent home medications include losartan 100 mg p.o. daily, furosemide 40 mg p.o. twice daily, potassium chloride 10 mEq p.o. daily.     As per the daughter at bedside she was not eating much over the past few weeks  She was recently started on doxycycline for a urinary tract infection  Her blood pressure on admission was low around 100/48    Past Medical History:   Diagnosis Date    Acute on chronic congestive heart failure (HCC)     Acute respiratory failure with hypoxia ([address] 12/31/2017    VALERIE (acute kidney injury) ([address] 7/24/2021    Arthritis     Atrial fibrillation (HCC)     Back pain 4-5 years    CAD (coronary artery disease)     Chronic diastolic CHF (congestive heart failure) ([address] 11/17/2017    Difficulty walking 4-5 years    Hypertension     Hypothyroidism     Osteoarthritis     Pneumonia due to organism 12/30/2017    Rheumatoid arthritis ([address] 5/4/2012    Spinal stenosis     Urinary tract infection, acute        Past Surgical History:   Procedure Laterality Date    APPENDECTOMY      CATARACT REMOVAL  2007    COLONOSCOPY      ECHO COMPLETE  5/7/2012         EYE SURGERY      FEMUR SURGERY  5/5/12     IM RODDING LEFT FEMUR    FRACTURE SURGERY      HERNIA REPAIR  2008    HYSTERECTOMY  1968    JOINT REPLACEMENT  1992    left knee    JOINT REPLACEMENT  2000    right knee    PACEMAKER PLACEMENT  2009    PACEMAKER PLACEMENT  02/21/2017    Dual chamber Medtronic pgr    TONSILLECTOMY      UPPER GASTROINTESTINAL ENDOSCOPY N/A 11/18/2019    EGD BIOPSY performed by Rosie Kinney MD at [facility] ENDOSCOPY       Family History   Problem Relation Age of Onset    Stroke Father     Cancer Sister     Cancer Brother     Cancer Daughter         reports that she has never smoked. She has never used smokeless tobacco. She reports that she does not drink alcohol and does not use drugs.     Allergies:  Penicillins and Sulfa antibiotics    Current Medications:    meropenem (MERREM) 500 mg IVPB (mini-bag), Q12H  azithromycin (ZITHROMAX) 500 mg in D5W 250ml addavial, Q24H  0.9 % sodium chloride infusion, Q12H  sodium chloride flush 0.9 % injection 5-40 mL, 2 times per day  sodium chloride flush 0.9 % injection 5-40 mL, PRN  0.9 % sodium chloride infusion, PRN  heparin (porcine) injection 5,000 Units, 3 times per day  polyethylene glycol (GLYCOLAX) packet 17 g, Daily PRN  trimethobenzamide (TIGAN) injection 200 mg, Q6H PRN  albuterol sulfate  (90 Base) MCG/ACT inhaler 2 puff, Q6H  donepezil (ARICEPT) tablet 5 mg, Daily  gabapentin (NEURONTIN) capsule 100 mg, Nightly  isosorbide mononitrate (IMDUR) extended release tablet 30 mg, Daily  levothyroxine (SYNTHROID) tablet 50 mcg, Nightly  metoprolol tartrate (LOPRESSOR) tablet 100 mg, BID  pantoprazole (PROTONIX) tablet 40 mg, BID AC  perflutren lipid microspheres (DEFINITY) injection 1.65 mg, ONCE PRN  lactated ringers 07/25/2021    LABGLOM 21 07/25/2021    GLUCOSE 193 07/25/2021    GLUCOSE 99 05/31/2012     Calcium:    Lab Results   Component Value Date    CALCIUM 8.8 07/25/2021     Phosphorus:    Lab Results   Component Value Date    PHOS 2.9 01/02/2018     Uric Acid:  No results found for: URICACID  U/A:    Lab Results   Component Value Date    NITRU Negative 07/24/2021    COLORU DARK YELLOW 07/24/2021    PHUR 5.0 07/24/2021    WBCUA 5-10 07/24/2021    WBCUA >20 05/25/2012    RBCUA 2-5 07/24/2021    RBCUA 0-1 12/18/2013    BACTERIA MANY 07/24/2021    CLARITYU Clear 07/24/2021    SPECGRAV >=1.030 07/24/2021    LEUKOCYTESUR TRACE 07/24/2021    UROBILINOGEN 1.0 07/24/2021    BILIRUBINUR SMALL 07/24/2021    BILIRUBINUR NEGATIVE 05/25/2012    BLOODU TRACE-INTACT 07/24/2021    GLUCOSEU Negative 07/24/2021    GLUCOSEU NEGATIVE 05/25/2012    KETUA Negative 07/24/2021        Imaging:  CT ABDOMEN PELVIS WO CONTRAST Additional Contrast? None    Result Date: 7/24/2021  EXAMINATION: CT OF THE ABDOMEN AND PELVIS WITHOUT CONTRAST 7/24/2021 3:15 pm TECHNIQUE: CT of the abdomen and pelvis was performed without the administration of intravenous contrast. Multiplanar reformatted images are provided for review. Dose modulation, iterative reconstruction, and/or weight based adjustment of the mA/kV was utilized to reduce the radiation dose to as low as reasonably achievable. COMPARISON: None. HISTORY: ORDERING SYSTEM PROVIDED HISTORY: elevated creatinin TECHNOLOGIST PROVIDED HISTORY: Reason for exam:->elevated creatinin Additional Contrast?->None Decision Support Exception - unselect if not a suspected or confirmed emergency medical condition->Emergency Medical Condition (MA) FINDINGS: Lung bases demonstrate patchy infiltrates in the lungs bilaterally more on the right upper lobe and right lower lobe and minimally in the left base concerning for pneumonia. There is cardiomegaly with coronary artery calcification.   Grossly, the liver is of normal architecture. Gallbladder is contracted with mild wall thickening. Please correlate with ultrasonography. Spleen, pancreas, and the right adrenal gland appear normal.  2.3 x 2.4 cm left adrenal nodule is present. Kidneys are grossly within normal limits. There is calcification aorta and severe degenerative changes in the lumbar spine. Pelvis. The bladder is contracted with a Bang catheter and wall thickening. There is diffuse diverticulosis of the descending and sigmoid colon with thickening of the sigmoid colon. There is constipation. Patchy infiltrates in the lung bases more on the right side concerning for pneumonia with small pleural effusion. Diffuse diverticulosis of the left hemicolon with thickening of the sigmoid colon likely spasm or uncomplicated diverticulitis. There is no obstructive uropathy. CT CHEST WO CONTRAST    Result Date: 7/24/2021  EXAMINATION: CT OF THE CHEST WITHOUT CONTRAST 7/24/2021 3:15 pm TECHNIQUE: CT of the chest was performed without the administration of intravenous contrast. Multiplanar reformatted images are provided for review. Dose modulation, iterative reconstruction, and/or weight based adjustment of the mA/kV was utilized to reduce the radiation dose to as low as reasonably achievable. COMPARISON: Chest radiograph same day 2 hour prior CT chest September 7, 2016 HISTORY: ORDERING SYSTEM PROVIDED HISTORY: SOB TECHNOLOGIST PROVIDED HISTORY: Reason for exam:->SOB FINDINGS: Mediastinum: No abnormal lymphadenopathy. The pulmonary trunk is normal in size. Cardiomegaly. Lungs/pleura:   Patchy consolidation seen in the bilateral lungs, right greater than left. There are air bronchograms identified. No pleural effusion. No pneumothorax. Upper Abdomen: No acute process identified Soft Tissues/Bones: No aggressive osseous lesions. Scattered bilateral consolidations most prominent in the right lung is concerning for multifocal pneumonia.   Follow-up CT of the chest may be obtained in 6-8 weeks after completion of therapy. XR CHEST 1 VIEW    Result Date: 7/24/2021  EXAMINATION: ONE XRAY VIEW OF THE CHEST 7/24/2021 1:25 pm COMPARISON: Chest radiograph June 21, 2019 HISTORY: ORDERING SYSTEM PROVIDED HISTORY: chest pain TECHNOLOGIST PROVIDED HISTORY: Reason for exam:->chest pain FINDINGS: Left-sided cardiac pacing device identified. Cardiac silhouette is enlarged but stable in size. There is focal right upper lobe opacity. Retrocardiac opacity also identified. No sizable pleural effusions. No pneumothorax. Right upper lobe opacity may represent pulmonary edema or pneumonia in the proper clinical setting. Consolidation in retrocardiac region may represent atelectasis versus pneumonia. Further evaluation with CT chest may be obtained if clinically warranted. Assessment  1. Acute kidney injury secondary to severe prerenal azotemia from sepsis secondary to multifocal pneumonia and a urinary tract infection, continued use of Lasix, spironolactone and losartan in this setting. Urine electrolytes reviewed and calculated fractional excretion of sodium is 0.2% strongly consistent with prerenal injury  2. Severe sepsis secondary to multifocal pneumonia and a urinary tract infection  3. Congestive heart failure with preserved ejection fraction, appears well compensated on exam  4. Elevated troponin    Plan  1. She has no signs of volume overload on exam, CT chest results reviewed, continue with IV lactated Ringer's at 75 mL/h  2. Spironolactone, losartan and Lasix are on hold  3. CT abdomen and pelvis results reviewed there is no evidence of hydronephrosis  4. Start LR  5.  Continue to monitor the renal function and urine output closely, I had an extensive discussion with the patient's daughter present at bedside, dialysis is not recommended at her age if there is no sign of renal recovery and the patient's family  do not want dialysis if needed if there is no improvement in renal function with medical management  6. Daughter in room. Answered question.       Thank you Dr. Angelito Domingo DO for allowing us to participate in care of Farhat Dumont           Electronically signed by Micki Figueredo MD on 7/26/2021 at 10:14 AM

## 2021-07-26 NOTE — PLAN OF CARE
Problem: Falls - Risk of:  Goal: Will remain free from falls  Description: Will remain free from falls  7/26/2021 0015 by Daryl Gutierrez RN  Outcome: Met This Shift     Problem: Falls - Risk of:  Goal: Absence of physical injury  Description: Absence of physical injury  7/26/2021 0015 by Daryl Gutierrez RN  Outcome: Met This Shift     Problem: Injury - Risk of, Physical Injury:  Goal: Will remain free from falls  Description: Will remain free from falls  7/26/2021 0015 by Daryl Gutierrez RN  Outcome: Met This Shift     Problem: Injury - Risk of, Physical Injury:  Goal: Absence of physical injury  Description: Absence of physical injury  7/26/2021 0015 by Daryl Gutierrez RN  Outcome: Met This Shift     Problem: Mood - Altered:  Goal: Mood stable  Description: Mood stable  7/26/2021 0015 by Daryl Gutierrez RN  Outcome: Met This Shift

## 2021-07-26 NOTE — CARE COORDINATION
Social Work/Discharge Planning:  Chart reviewed. Patient currently requiring 15 liters high flow oxygen. Called [facility] with [facility] and confirmed patient is a bed hold and no pre-cert needed to return. [facility] can accommodate a maximum of 10 liters of oxygen. Will continue to follow.   Electronically signed by SHERYL Olivo on 7/26/2021 at 3:03 PM

## 2021-07-26 NOTE — PROGRESS NOTES
Secure message left to Dr. Jonathan Paige regarding patient request for Tylenol. Awaiting response back.

## 2021-07-26 NOTE — PROGRESS NOTES
Subjective: The patient is resting on examination  No acute events overnight. Denies chest pain, angina  High flow nasal canula 15 liters O2    Objective:    BP (!) 105/57   Pulse 60   Temp 97.7 °F (36.5 °C) (Oral)   Resp 20   Ht 5' 4\" (1.626 m)   Wt 128 lb 12.5 oz (58.4 kg)   SpO2 91%   BMI 22.11 kg/m²     In: 240 [P.O.:240]  Out: 400   In: 240   Out: 400 [Urine:400]    General appearance: NAD, conversant  HEENT: AT/NC, MMM  Neck: FROM, supple  Lungs: Coarse 15 liters high flow  CV: RRR, no MRGs, pacer  Vasc: Radial pulses 2+  Abdomen: Soft, non-tender; no masses or HSM  Extremities: No peripheral edema or digital cyanosis  Skin: no rash, lesions or ulcers  Psych: Alert and oriented to person, place and time  Neuro: Alert and interactive     Recent Labs     07/24/21  1318 07/25/21  0322 07/26/21  0935   WBC 10.4 9.3 8.0   HGB 10.9* 10.3* 11.1*   HCT 34.4 32.6* 35.4    164 197       Recent Labs     07/24/21  1318 07/25/21  0322 07/26/21  0935    140 140   K 4.8 4.2 4.2    103 105   CO2 24 29 26   BUN 73* 78* 76*   CREATININE 2.3* 2.2* 1.5*   CALCIUM 8.9 8.8 9.1       Assessment:    Principal Problem:    Acute respiratory failure with hypoxia (HCC)  Active Problems:    Elevated troponin    Essential hypertension    Paroxysmal atrial fibrillation (HCC)    Sepsis (HCC)    Hypothyroidism    CAD (coronary artery disease)    VALERIE (acute kidney injury) (HCC)    Chronic CHF (congestive heart failure) (HCC)    Elevated brain natriuretic peptide (BNP) level    Pneumonia of both lower lobes due to infectious organism    Chronic anemia    Elevated liver enzymes    Pleural effusion  Resolved Problems:    * No resolved hospital problems.  *      Plan:  49-year-old female with a significant past medical history of A. fib, CAD, CHF, hypertension, and acute kidney injury admitted to telemetry unit with    Acute respiratory failure  -Supplement O2 demands keeping oxygen saturation greater than 92%.  -Broad-spectrum IV antibiotic therapy in the form of merrem/zithromax  -DuoNebs and Pulmicort, chest physiotherapy  -Monitor labs hemoglobin drops below seven transfuse as never necessary.  -Monitor electrolytes place as necessary  -Swallow study questionable aspiration pneumonia - ordered    A. fib  -Rate control  -V paced  -No anticoagulations due to history of GI Bleeds    Medication for other comorbidities continue as appropriate dose adjustment as necessary. DVT prophylaxis  PT OT  Discharge planning  Case discussed with attending and agreed upon plan of care. MARLEN Lutz - CNP  5:12 PM  7/26/2021     I personally saw, examined and provided care for the patient. Radiographs, labs and medication list were reviewed by me independently. The case was discussed in detail and plans for care were established. Review of [address] [place] [address], documentation was conducted and revisions were made as appropriate directly by me. I agree with the above documented exam, problem list, and plan of care.      Brittney Owens MD  10:02 PM  7/26/2021

## 2021-07-26 NOTE — PROGRESS NOTES
INPATIENT CARDIOLOGY FOLLOW-UP    Name: Lydia Ballard    Age: 80 y.o. Date of Admission: 7/24/2021 12:24 PM    Date of Service: 7/26/2021    Primary Cardiologist: Previously Dr Wilda Tariq, known to Dr Zak Pichardo from this admission    Chief Complaint: Follow-up for troponin    Interim History:  Confused. Denies complaints. No chest pain or shortness of breath. Daughter at the bedside.     Review of Systems:   Negative except as described above    Problem List:  Patient Active Problem List   Diagnosis    At risk for falling    Essential hypertension    ASHD (arteriosclerotic heart disease)    [facility]    History of TIA (transient ischemic attack)    Paroxysmal atrial fibrillation (HCC)    S/P cardiac pacemaker procedure    Chronic [facility]) [facility])    Sepsis ([facility]    Elevated troponin    Tinea unguium    Ingrown nail    Peripheral vascular disease, unspecified ([facility])    Pain in toe of right foot    Pain in left toe(s)    Difficulty walking    Corn or callus    Pressure injury of left foot, unstageable ([facility]    Acute gastric ulcer with hemorrhage    Osteoarthritis    Hypothyroidism    Hypertension    CAD (coronary artery disease)    Acute blood loss anemia    Acute respiratory failure with hypoxia (HCC)    VALERIE (acute kidney injury) (HCC)    Chronic CHF (congestive heart failure) (HCC)    Elevated brain natriuretic peptide (BNP) level    Pneumonia of both lower lobes due to infectious organism    Chronic anemia    Elevated liver enzymes    Pleural effusion       Current Medications:    Current Facility-Administered Medications:     acetaminophen (TYLENOL) tablet 650 mg, 650 mg, Oral, Q6H PRN, Akash Pal, APRN - CNP    meropenem (MERREM) 500 mg IVPB (mini-bag), 500 mg, Intravenous, Q12H, Joy Bragg MD, Last Rate: 33.3 mL/hr at 07/26/21 1014, 500 mg at 07/26/21 1014    azithromycin (ZITHROMAX) 500 mg in D5W 250ml addavial, 500 mg, Intravenous, Q24H, Ulises Gandhi MD, Stopped at 07/25/21 1826    0.9 % sodium chloride infusion, , Intravenous, Q12H, Ulises Gandhi MD, Stopped at 07/25/21 2035    sodium chloride flush 0.9 % injection 5-40 mL, 5-40 mL, Intravenous, 2 times per day, April Forrest-Alfonzo, APRN - CNP, 10 mL at 07/26/21 1022    sodium chloride flush 0.9 % injection 5-40 mL, 5-40 mL, Intravenous, PRN, April Mo[place], APRN - CNP, 10 mL at 07/25/21 1612    0.9 % sodium chloride infusion, 25 mL, Intravenous, PRN, April [place], APRN - CNP    heparin (porcine) injection 5,000 Units, 5,000 Units, Subcutaneous, 3 times per day, April Storey-[place], APRN - CNP, 5,000 Units at 07/26/21 0500    polyethylene glycol (GLYCOLAX) packet 17 g, 17 g, Oral, Daily PRN, April [place], APRN - CNP    trimethobenzamide Asenath Allie) injection 200 mg, 200 mg, Intramuscular, Q6H PRN, April Jose Gus, APRN - CNP    albuterol sulfate  (90 Base) MCG/ACT inhaler 2 puff, 2 puff, Inhalation, Q6H, April Moise-Alfonzo, APRN - CNP, 2 puff at 07/25/21 1321    donepezil (ARICEPT) tablet 5 mg, 5 mg, Oral, Daily, April Storey-Cornelius, APRN - CNP, 5 mg at 07/26/21 3822    gabapentin (NEURONTIN) capsule 100 mg, 100 mg, Oral, Nightly, April Storey-Alfonzo, APRN - CNP, 100 mg at 07/25/21 2006    isosorbide mononitrate (IMDUR) extended release tablet 30 mg, 30 mg, Oral, Daily, April Storey-Alfonzo, APRN - CNP, 30 mg at 07/26/21 4990    levothyroxine (SYNTHROID) tablet 50 mcg, 50 mcg, Oral, Nightly, April MARLEN Hernandez - CNP, 50 mcg at 07/25/21 2006    metoprolol tartrate (LOPRESSOR) tablet 100 mg, 100 mg, Oral, BID, April MARLEN Hernandez - CNP    pantoprazole (PROTONIX) tablet 40 mg, 40 mg, Oral, BID AC, April MARLEN Hernandez CNP, 40 mg at 07/26/21 0500    perflutren lipid microspheres (DEFINITY) injection 1.65 mg, 1.5 mL, Intravenous, ONCE PRN, April Mary, APRN - CNP    lactated ringers infusion, , Intravenous, Continuous, Darrick Leon MD, Stopped at 07/25/21 2036    Physical Exam:  BP 96/69   Pulse 62   Temp 97.1 °F (36.2 °C) (Temporal)   Resp 20   Ht 5' 4\" (1.626 m)   Wt 128 lb 12.5 oz (58.4 kg)   SpO2 91%   BMI 22.11 kg/m²   Wt Readings from Last 3 Encounters:   07/26/21 128 lb 12.5 oz (58.4 kg)   05/10/21 150 lb (68 kg)   02/08/21 150 lb (68 kg)     Appearance: Awake, confused, on high flow nasal cannula  Skin: Intact, no rash  Head: Normocephalic, atraumatic  Eyes: EOMI, no conjunctival erythema  ENMT: No pharyngeal erythema, MMM, no rhinorrhea  Neck: Supple, no elevated JVP, no carotid bruits  Lungs: Scattered bilateral rhonchi  Cardiac: PMI nondisplaced, Regular rhythm with a normal rate, S1 & S2 normal, no murmurs. Left chest pacemaker.   Abdomen: Soft, nontender, +bowel sounds  Extremities: Moves all extremities x 4, no lower extremity edema  Neurologic: No focal motor deficits apparent, normal mood and affect  Peripheral Pulses: Intact posterior tibial pulses bilaterally    Intake/Output:    Intake/Output Summary (Last 24 hours) at 7/26/2021 1239  Last data filed at 7/26/2021 0741  Gross per 24 hour   Intake 2276 ml   Output 550 ml   Net 1726 ml     I/O this shift:  In: 120 [P.O.:120]  Out: -     Laboratory Tests:  Recent Labs     07/24/21  1318 07/25/21  0322 07/26/21  0935    140 140   K 4.8 4.2 4.2    103 105   CO2 24 29 26   BUN 73* 78* 76*   CREATININE 2.3* 2.2* 1.5*   GLUCOSE 143* 193* 184*   CALCIUM 8.9 8.8 9.1     Lab Results   Component Value Date    MG 2.0 01/02/2018     Recent Labs     07/24/21  1318 07/25/21  0322 07/26/21  0935   ALKPHOS 101 94 101   ALT 41* 40* 55*   AST 47* 44* 59*   PROT 6.7 6.1* 5.9*   BILITOT 0.8 0.6 0.5   LABALBU 3.0* 2.7* 2.4*     Recent Labs     07/24/21  1318 07/25/21  0322 07/26/21  0935   WBC 10.4 9.3 8.0   RBC 3.50 3.26* 3.54   HGB 10.9* 10.3* 11.1*   HCT 34.4 32.6* 35.4   MCV 98.3 100.0* 100.0*   MCH 31.1 31.6 31.4   MCHC 31.7* 31.6* 31.4*   RDW 14.9 14.9 14.6    164 197   MPV 13.1* 12.8* 13.0*     Lab Results   Component Value Date    CKTOTAL 24 01/07/2018    CKMB 1.7 01/07/2018    TROPONINI 0.26 (H) 01/31/2018    TROPONINI 0.34 (H) 01/30/2018    TROPONINI 0.34 (H) 01/30/2018     Lab Results   Component Value Date    INR 1.3 11/18/2019    INR 1.1 06/21/2019    INR 1.4 02/01/2018    PROTIME 14.5 (H) 11/18/2019    PROTIME 12.4 06/21/2019    PROTIME 15.8 (H) 02/01/2018     Lab Results   Component Value Date    TSH 0.498 07/25/2021     No results found for: LABA1C  No results found for: EAG  Lab Results   Component Value Date    CHOL 162 12/20/2013    CHOL 129 05/06/2012     Lab Results   Component Value Date    TRIG 88 12/20/2013    TRIG 72 05/06/2012     Lab Results   Component Value Date    HDL 61 12/20/2013    HDL 43.0 05/06/2012     Lab Results   Component Value Date    LDLCALC 83 12/20/2013    LDLCALC 72 05/06/2012     No results found for: LABVLDL, VLDL  No results found for: CHOLHDLRATIO  Recent Labs     07/24/21  1318 07/26/21  0935   PROBNP 34,475* 13,284*       Cardiac Tests:    EKG: Ventricular paced rhythm 62 bpm with underlying atrial fibrillation    Telemetry: V paced 60s    Chest X-ray:   7/24/21    Impression   Right upper lobe opacity may represent pulmonary edema or pneumonia in the   proper clinical setting.       Consolidation in retrocardiac region may represent atelectasis versus   pneumonia.       Further evaluation with CT chest may be obtained if clinically warranted. CT chest 7/24/21    Impression   Scattered bilateral consolidations most prominent in the right lung is   concerning for multifocal pneumonia.  Follow-up CT of the chest may be   obtained in 6-8 weeks after completion of therapy. Echocardiogram:   TTE 7/25/21   Ejection fraction is visually estimated at 65%. No regional wall motion abnormalities seen.    Dilated right ventricle with reduced function. TAPSE is 1.6 cm. Right   ventricle pacemaker lead noted. Left atrial volume index of 66 ml per meters squared BSA. Mild aortic stenosis is present. Physiologic and/or trace aortic regurgitation is noted. Mild to moderate mitral regurgitation is present. Moderate to severe tricuspid regurgitation. Pulmonary hypertension is moderate. RVSP is 52 mmHg. Stress test:      Cardiac catheterization:     ----------------------------------------------------------------------------------------------------------------------------------------------------------------  IMPRESSION:  1. Elevated high-sensitivity troponin: Likely acute myocardial injury in the setting of multifocal pneumonia/hypoxia/VALERIE. 265 -> 143 ng/L. No evidence of ischemia or ACS  2. Chronic heart failure with preserved EF.  proBNP 35,000 -> 13,300. Appears euvolemic  3. Acute hypoxic respiratory failure/multifocal pneumonia/presumptive Legionella on high flow nasal cannula. Procalcitonin 40  4. Permanent atrial fibrillation. No anticoagulation due to prior GI bleeding  5. Permanent pacemaker, followed by Dr. Saintclair Sanger  6. Valvular heart disease: Mild aortic stenosis, moderate to severe TR, mild to moderate MR  7. VALERIE/CKD creatinine 2.3 -> 1.5. Baseline creatinine 0.8 in 2019  8. Anemia Hgb 11.1  9. UTI  10. Hypertension  11. Rheumatoid arthritis  12. History of GI bleed  13. Gout  14. Dementia  15. DNR CCA      RECOMMENDATIONS:  Presently stable from cardiac standpoint.      No further cardiac work-up anticipated at this time   Caution with IV fluids to avoid precipitating decompensated heart failure though currently appears euvolemic   Continue current cardiac medications including isosorbide mononitrate, metoprolol tartrate   Historically not on anticoagulation due to prior GI bleeding   Low-dose aspirin reasonable given elevated troponin, monitor hemoglobin   Treatment of Legionella pneumonia per ID   Further care per primary, nephrology   Risk factor modification   Outpatient follow-up with Dr. Teena Prado and Dr. Jose Truong Will be available as needed, please call with questions    Discussed with patient's daughter at the bedside    Cheli Haque MD, [address] [place] [address] Cardiology    NOTE: This report was transcribed using voice recognition software. Every effort was made to ensure accuracy; however, inadvertent computerized transcription errors may be present.

## 2021-07-26 NOTE — DISCHARGE INSTR - COC
Continuity of Care Form    Patient Name: Flakito Sol   :  1924  MRN:  [id number]    Admit date:  2021  Discharge date:  ***    Code Status Order: DNR-CCA   Advance Directives:     Admitting Physician:  No admitting provider for patient encounter. PCP: Radha Escobedo DO    Discharging Nurse: [facility] Unit/Room#: 3265/5467-W  Discharging Unit Phone Number: ***    Emergency Contact:   Extended Emergency Contact Information  Primary Emergency Contact: [facility]  Address: [address] [place] [address] [address] [facility] Phone: [phone]  Mobile Phone: [phone]  Relation: Spouse   needed?  No  Secondary Emergency Contact: [facility] [address] [facility] Phone: [phone]  Mobile Phone: [phone]  Relation: Child    Past Surgical History:  Past Surgical History:   Procedure Laterality Date    APPENDECTOMY      CATARACT REMOVAL      COLONOSCOPY      ECHO COMPLETE  2012         EYE SURGERY      FEMUR SURGERY  12     IM RODDING LEFT FEMUR    FRACTURE SURGERY      HERNIA REPAIR  2008    HYSTERECTOMY  1968    JOINT REPLACEMENT  1992    left knee    JOINT REPLACEMENT      right knee    PACEMAKER PLACEMENT  2009    PACEMAKER PLACEMENT  2017    Dual chamber Medtronic pgr    TONSILLECTOMY      UPPER GASTROINTESTINAL ENDOSCOPY N/A 2019    EGD BIOPSY performed by Laura Pacheco MD at [facility] ENDOSCOPY       Immunization History:   Immunization History   Administered Date(s) Administered    COVID-19, Pfizer, PF, 30mcg/0.3mL 2021, 02/15/2021    Pneumococcal Conjugate Vaccine 2014    Td, unspecified formulation 2013    Zoster Recombinant (Shingrix) 2020       Active Problems:  Patient Active Problem List   Diagnosis Code    At risk for falling Z91.81    Essential hypertension I10    ASHD (arteriosclerotic heart disease) I25.10    Rheumatoid arthritis ([facility] M06.9  History of TIA (transient ischemic attack) Z86.73    Paroxysmal atrial fibrillation ([facility] I48.0    S/P cardiac pacemaker procedure Z95.0    Chronic diastolic CHF (congestive heart failure) ([facility]) I50.32    Sepsis ([facility]) A41.9    Elevated troponin R77.8    Tinea unguium B35.1    Ingrown nail L60.0    Peripheral vascular disease, unspecified ([facility]) I73.9    Pain in toe of right foot M79.674    Pain in left toe(s) M79.675    Difficulty walking R26.2    Corn or callus L84    Pressure injury of left foot, unstageable ([facility]) L89.890    Acute gastric ulcer with hemorrhage K25.0    Osteoarthritis M19.90    Hypothyroidism E03.9    Hypertension I10    CAD (coronary artery disease) I25.10    Acute blood loss anemia D62    Acute respiratory failure with hypoxia ([facility]) J96.01    VALERIE (acute kidney injury) ([facility]) N17.9    Chronic CHF (congestive heart failure) ([facility]) I50.9    Elevated brain natriuretic peptide (BNP) level R79.89    Pneumonia of both lower lobes due to infectious organism J18.9    Chronic anemia D64.9    Elevated liver enzymes R74.8    Pleural effusion J90       Isolation/Infection:   Isolation          No Isolation        Patient Infection Status     Infection Onset Added Last Indicated Last Indicated By Review Planned Expiration Resolved Resolved By    None active    Resolved    COVID-19 Rule Out 07/24/21 07/24/21 07/24/21 COVID-19, Rapid (Ordered)   07/24/21 Rule-Out Test Resulted          Nurse Assessment:  Last Vital Signs: BP (!) 107/58   Pulse 59   Temp 97.8 °F (36.6 °C) (Axillary)   Resp 20   Ht 5' 4\" (1.626 m)   Wt 128 lb 12.5 oz (58.4 kg)   SpO2 96%   BMI 22.11 kg/m²     Last documented pain score (0-10 scale): Pain Level: 0  Last Weight:   Wt Readings from Last 1 Encounters:   07/26/21 128 lb 12.5 oz (58.4 kg)     Mental Status:  {IP PT MENTAL STATUS:20030}    IV Access:  {[facility] [place]:532491798}    Nursing Mobility/ADLs:  Walking   {[facility] ZISM:492244726}  Transfer {CHP DME KEPP:541831486}  Bathing  {CHP DME RGQY:945137773}  Dressing  {CHP DME SJOM:946957624}  Toileting  {CHP DME QUKU:394287546}  Feeding  {CHP DME IVKN:690914337}  Med Admin  {CHP DME QTXC:636106806}  Med Delivery   { PETR MED Delivery:148902484}    Wound Care Documentation and Therapy:        Elimination:  Continence:   · Bowel: {YES / ER:00145}  · Bladder: {YES / CY:91943}  Urinary Catheter: {Urinary Catheter:359868811}   Colostomy/Ileostomy/Ileal Conduit: {YES / LN:42910}       Date of Last BM: ***    Intake/Output Summary (Last 24 hours) at 2021 0741  Last data filed at 2021 0352  Gross per 24 hour   Intake 2156 ml   Output 550 ml   Net 1606 ml     I/O last 3 completed shifts:   In: 2156 [P.O.:80; I.V.:1626; IV Piggyback:450]  Out: 550 [Urine:550]    Safety Concerns:     [address] [employer] Safety Concerns:318066716}    Impairments/Disabilities:      [address] [employer] Impairments/Disabilities:783896539}    Nutrition Therapy:  Current Nutrition Therapy:   [address] [employer] Diet List:762564712}    Routes of Feeding: {CHP DME Other Feedings:349731452}  Liquids: {Slp liquid thickness:34525}  Daily Fluid Restriction: {CHP DME Yes amt example:492721394}  Last Modified Barium Swallow with Video (Video Swallowing Test): {Done Not Done NJPW:559299615}    Treatments at the Time of Hospital Discharge:   Respiratory Treatments: ***  Oxygen Therapy:  {Therapy; copd oxygen:92384}  Ventilator:    { CC Vent SBJM:323227989}    Rehab Therapies: {THERAPEUTIC INTERVENTION:8263399657}  Weight Bearing Status/Restrictions: 508 ZBD Displays Weight Bearin}  Other Medical Equipment (for information only, NOT a DME order):  {EQUIPMENT:393095737}  Other Treatments: ***    Patient's personal belongings (please select all that are sent with patient):  {CHP DME Belongings:418523319}    RN SIGNATURE:  {Esignature:149908765}    CASE MANAGEMENT/SOCIAL WORK SECTION    Inpatient Status Date: 2021    Readmission Risk Assessment Score:  Readmission Risk Risk of Unplanned Readmission:  16           Discharging to Facility/ Agency   · Name: [facility]  · Address: [facility] [address] [address]  · Phone: [phone]  · Fax: [phone]    Dialysis Facility (if applicable)   · Name:  · Address:  · Dialysis Schedule:  · Phone:  · Fax:    / signature: Electronically signed by SHERYL Mott on 7/26/21 at 7:42 AM EDT    PHYSICIAN SECTION    Prognosis: {Prognosis:9604634576}    Condition at Discharge: 50[address] Pérez Patient Condition:399682587}    Rehab Potential (if transferring to Rehab): {Prognosis:8867525157}    Recommended Labs or Other Treatments After Discharge: ***    Physician Certification: I certify the above information and transfer of Morelia Urias  is necessary for the continuing treatment of the diagnosis listed and that she requires {Admit to Appropriate Level of Care:94468} for {GREATER/LESS:826061765} 30 days.      Update Admission H&P: {CHP DME Changes in DAJY:794447990}    PHYSICIAN SIGNATURE:  {Esignature:397787569}

## 2021-07-26 NOTE — PROGRESS NOTES
[address] [place] [address] [facility]  Progress Note    SUBJECTIVE:  Chief Complaint   Patient presents with    Fatigue     lethargic     Patient is tolerating medications. No nausea, vomiting, diarrhea, shortness of breath, fevers. Very hard of hearing. 15LNC  Says she is tired & has back pain but better today and less lethargic    Review of systems:  As stated above in the chief complaint, otherwise negative. Medications:  Scheduled Meds:   meropenem  500 mg Intravenous Q12H    azithromycin  500 mg Intravenous Q24H    sodium chloride flush  5-40 mL Intravenous 2 times per day    heparin (porcine)  5,000 Units Subcutaneous 3 times per day    albuterol sulfate HFA  2 puff Inhalation Q6H    donepezil  5 mg Oral Daily    gabapentin  100 mg Oral Nightly    isosorbide mononitrate  30 mg Oral Daily    levothyroxine  50 mcg Oral Nightly    metoprolol tartrate  100 mg Oral BID    pantoprazole  40 mg Oral BID AC     Continuous Infusions:   sodium chloride Stopped (21)    sodium chloride      lactated ringers Stopped (21)     PRN Meds:sodium chloride flush, sodium chloride, polyethylene glycol, trimethobenzamide, perflutren lipid microspheres    OBJECTIVE:  /64   Pulse 59   Temp 97.3 °F (36.3 °C) (Oral)   Resp 18   Ht 5' 4\" (1.626 m)   Wt 128 lb 12.5 oz (58.4 kg)   SpO2 (!) 89%   BMI 22.11 kg/m²   Temp  Av.4 °F (36.3 °C)  Min: 97 °F (36.1 °C)  Max: 97.8 °F (36.6 °C)  Constitutional: The patient is lying in bed, in NAD. C/o back pain. Weak, fatigued  Skin: Warm and dry. No rashes were noted. Poor turgor   HEENT: Round and reactive pupils. Moist mucous membranes. No ulcerations or thrush. Neck: Supple to movements. Chest: No use of accessory muscles to breathe. Symmetrical expansion. No wheezing, crackles or rhonchi. Poor air exchange. 15LNC  Cardiovascular: S1 and S2 are rhythmic and regular. No murmurs appreciated.    Abdomen: Positive bowel Above discussed agree with advanced practice nurse. Labs, cultures, and radiographs reviewed. Face to Face encounter occurred. Changes made as necessary.      Carlitos Tineo MD  10:02 AM  7/26/2021

## 2021-07-26 NOTE — FLOWSHEET NOTE
Inpatient Wound Care    Admit Date: 7/24/2021 12:24 PM    Reason for consult:  R buttocks    Significant history:  Admitted with Respir failure. History of CAD, RA, CHF    Wound history:  POA    Findings:       07/26/21 1623   Wound 07/26/21 Buttocks Right   Date First Assessed/Time First Assessed: 07/26/21 1622   Present on Hospital Admission: Yes  Location: Buttocks  Wound Location Orientation: Right   Wound Image    Wound Etiology Deep tissue/Injury   Dressing Status Intact   Dressing/Treatment Foam   Dressing Change Due 07/27/21   Wound Length (cm) 5 cm   Wound Width (cm) 1.5 cm   Wound Surface Area (cm^2) 7.5 cm^2   Wound Assessment Purple/maroon  (adjacent 3 x 1.5 DTI)   Drainage Amount None   Odor None       Impression:  DTI R buttock. Appears as if Pt sat on something for a prolonged time    Interventions in place:  Sacral mepilex intact. Plan: Sacral mepilex, SOS precautions, low air loss   **Informed Consent**    The patient has given verbal consent to have photos taken of  R buttocks and inserted into their chart as part of their permanent medical record for purposes of documentation, treatment management and/or medical review. All Images taken on 7/26/21 of patient name: Susan Yady were transmitted and stored on secured Balloon located within [facility] by a registered [employer] Mobile Application Device.        Lucho Cisneros RN 7/26/2021 4:24 PM

## 2021-07-26 NOTE — PLAN OF CARE
Problem: Falls - Risk of:  Goal: Will remain free from falls  Description: Will remain free from falls  7/26/2021 1121 by Chasity Hunt RN  Outcome: Met This Shift  7/26/2021 0015 by Cecelia Closs, RN  Outcome: Met This Shift     Problem: Skin Integrity:  Goal: Will show no infection signs and symptoms  Description: Will show no infection signs and symptoms  Outcome: Met This Shift     Problem: Confusion - Acute:  Goal: Absence of continued neurological deterioration signs and symptoms  Description: Absence of continued neurological deterioration signs and symptoms  Outcome: Met This Shift     Problem: Confusion - Acute:  Goal: Mental status will be restored to baseline  Description: Mental status will be restored to baseline  Outcome: Met This Shift     Problem: Injury - Risk of, Physical Injury:  Goal: Will remain free from falls  Description: Will remain free from falls  7/26/2021 1121 by Chasity Hunt RN  Outcome: Met This Shift  7/26/2021 0015 by Cecelia Closs, RN  Outcome: Met This Shift     Problem: Injury - Risk of, Physical Injury:  Goal: Absence of physical injury  Description: Absence of physical injury  7/26/2021 1121 by Chasity Hunt RN  Outcome: Met This Shift  7/26/2021 0015 by Cecelia Closs, RN  Outcome: Met This Shift     Problem: Mood - Altered:  Goal: Verbalizations of feeling emotionally comfortable while being cared for will increase  Description: Verbalizations of feeling emotionally comfortable while being cared for will increase  Outcome: Met This Shift     Problem: Psychomotor Activity - Altered:  Goal: Absence of psychomotor disturbance signs and symptoms  Description: Absence of psychomotor disturbance signs and symptoms  Outcome: Met This Shift     Problem: Sensory Perception - Impaired:  Goal: Demonstrations of improved sensory functioning will increase  Description: Demonstrations of improved sensory functioning will increase  Outcome: Met This Shift

## 2021-07-27 NOTE — PROGRESS NOTES
Comprehensive Nutrition Assessment    Type and Reason for Visit:  Initial, RD Nutrition Re-Screen/LOS    Nutrition Recommendations/Plan: Continue current diet and ONS to help with wound healing and optimizing nutrient needs. Nutrition Assessment:  PMHx of HTN,  hypothyroidism, CAD, atrial fibrillation, permanent pacemaker as of 2008, HFpEF. Patient presented to the ED from SNF with worsening shortness of breath. Pt adm with Acute hypoxic respiratory failure, sepsis. Pt also has wound on coccyx. Pt at risk d/t wound. Will start ONS to help with wound healing. Malnutrition Assessment:  Malnutrition Status:  No malnutrition    Context:  Chronic Illness     Findings of the 6 clinical characteristics of malnutrition:  Energy Intake:  No significant decrease in energy intake  Weight Loss:  No significant weight loss     Body Fat Loss:  No significant body fat loss     Muscle Mass Loss:  No significant muscle mass loss    Fluid Accumulation:  No significant fluid accumulation     Strength:  Not Performed    Estimated Daily Nutrient Needs:  Energy (kcal):   ([id number]); Weight Used for Energy Requirements:  Current     Protein (g):  75-85g (1.3-1.5g/kg IBW); Weight Used for Protein Requirements:  Current        Fluid (ml/day):  ; Method Used for Fluid Requirements:  1 ml/kcal      Nutrition Related Findings:  A&Ox4, BS hypoactive, Abd WDL, +I/Os, Edema trace      Wounds:  Pressure Injury (coccyx)       Current Nutrition Therapies:    ADULT DIET; Regular; Low Fat/Low Chol/High Fiber/2 gm Na; Low Sodium (2 gm); 1500 ml    Anthropometric Measures:  · Height: 5' 4\" (162.6 cm)  · Current Body Weight: 128 lb 12.5 oz (58.4 kg) (7/26)   · Admission Body Weight: 132 lb (59.9 kg) (7/24 [facility])    · Usual Body Weight:       · Ideal Body Weight: 120 lbs; % Ideal Body Weight 107.3 %   · BMI: 22.1      · BMI Categories: Normal Weight (BMI 18.5-24. 9)       Nutrition Diagnosis:   · Increased nutrient needs related to increase demand for energy/nutrients as evidenced by wounds    Nutrition Interventions:   Food and/or Nutrient Delivery:  Continue Current Diet, Start Oral Nutrition Supplement (Flip BID)  Nutrition Education/Counseling:  Education initiated (discussed importance of ONS)   Coordination of Nutrition Care:  Continue to monitor while inpatient    Goals:  >75% of meals and ONS consumed       Nutrition Monitoring and Evaluation:   Behavioral-Environmental Outcomes:  None Identified   Food/Nutrient Intake Outcomes:  Food and Nutrient Intake, Supplement Intake  Physical Signs/Symptoms Outcomes:  Fluid Status or Edema, Skin, Weight, Biochemical Data, Nutrition Focused Physical Findings     Discharge Planning:     Too soon to determine     Electronically signed by Vin Roberts RD, LD on 7/27/21 at 11:17 AM EDT    Contact: [phone]

## 2021-07-27 NOTE — PROGRESS NOTES
CHF (congestive heart failure) ([facility] [address] 11/17/2017    Difficulty walking 4-5 years    Hypertension     Hypothyroidism     Osteoarthritis     Pneumonia due to organism 12/30/2017    Rheumatoid arthritis ([facility] [address] 5/4/2012    Spinal stenosis     Urinary tract infection, acute        Past Surgical History:   Procedure Laterality Date    APPENDECTOMY      CATARACT REMOVAL  2007    COLONOSCOPY      ECHO COMPLETE  5/7/2012         EYE SURGERY      FEMUR SURGERY  5/5/12     IM RODDING LEFT FEMUR    FRACTURE SURGERY      HERNIA REPAIR  2008    HYSTERECTOMY  1968    JOINT REPLACEMENT  1992    left knee    JOINT REPLACEMENT  2000    right knee    PACEMAKER PLACEMENT  2009    PACEMAKER PLACEMENT  02/21/2017    Dual chamber Medtronic pgr    TONSILLECTOMY      UPPER GASTROINTESTINAL ENDOSCOPY N/A 11/18/2019    EGD BIOPSY performed by Nima Conley MD at [facility] ENDOSCOPY       Family History   Problem Relation Age of Onset    Stroke Father     Cancer Sister     Cancer Brother     Cancer Daughter         reports that she has never smoked. She has never used smokeless tobacco. She reports that she does not drink alcohol and does not use drugs.     Allergies:  Penicillins and Sulfa antibiotics    Current Medications:    albuterol (PROVENTIL) nebulizer solution 2.5 mg, Q6H  acetaminophen (TYLENOL) tablet 650 mg, Q6H PRN  aspirin EC tablet 81 mg, Daily  meropenem (MERREM) 500 mg IVPB (mini-bag), Q12H  azithromycin (ZITHROMAX) 500 mg in D5W 250ml addavial, Q24H  0.9 % sodium chloride infusion, Q12H  sodium chloride flush 0.9 % injection 5-40 mL, 2 times per day  sodium chloride flush 0.9 % injection 5-40 mL, PRN  0.9 % sodium chloride infusion, PRN  heparin (porcine) injection 5,000 Units, 3 times per day  polyethylene glycol (GLYCOLAX) packet 17 g, Daily PRN  trimethobenzamide (TIGAN) injection 200 mg, Q6H PRN  donepezil (ARICEPT) tablet 5 mg, Daily  gabapentin (NEURONTIN) capsule 100 mg, Nightly  isosorbide 07/26/2021    ALT 55 07/26/2021     BMP:    Lab Results   Component Value Date     07/26/2021    K 4.2 07/26/2021    K 4.2 07/25/2021     07/26/2021    CO2 26 07/26/2021    BUN 76 07/26/2021    LABALBU 2.4 07/26/2021    LABALBU 2.6 05/16/2012    CREATININE 1.5 07/26/2021    CALCIUM 9.1 07/26/2021    GFRAA 39 07/26/2021    LABGLOM 32 07/26/2021    GLUCOSE 184 07/26/2021    GLUCOSE 99 05/31/2012     Calcium:    Lab Results   Component Value Date    CALCIUM 9.1 07/26/2021     Phosphorus:    Lab Results   Component Value Date    PHOS 2.9 01/02/2018     Uric Acid:  No results found for: URICACID  U/A:    Lab Results   Component Value Date    NITRU Negative 07/24/2021    COLORU DARK YELLOW 07/24/2021    PHUR 5.0 07/24/2021    WBCUA 5-10 07/24/2021    WBCUA >20 05/25/2012    RBCUA 2-5 07/24/2021    RBCUA 0-1 12/18/2013    BACTERIA MANY 07/24/2021    CLARITYU Clear 07/24/2021    SPECGRAV >=1.030 07/24/2021    LEUKOCYTESUR TRACE 07/24/2021    UROBILINOGEN 1.0 07/24/2021    BILIRUBINUR SMALL 07/24/2021    BILIRUBINUR NEGATIVE 05/25/2012    BLOODU TRACE-INTACT 07/24/2021    GLUCOSEU Negative 07/24/2021    GLUCOSEU NEGATIVE 05/25/2012    KETUA Negative 07/24/2021        Imaging:  CT ABDOMEN PELVIS WO CONTRAST Additional Contrast? None    Result Date: 7/24/2021  EXAMINATION: CT OF THE ABDOMEN AND PELVIS WITHOUT CONTRAST 7/24/2021 3:15 pm TECHNIQUE: CT of the abdomen and pelvis was performed without the administration of intravenous contrast. Multiplanar reformatted images are provided for review. Dose modulation, iterative reconstruction, and/or weight based adjustment of the mA/kV was utilized to reduce the radiation dose to as low as reasonably achievable. COMPARISON: None.  HISTORY: ORDERING SYSTEM PROVIDED HISTORY: elevated creatinin TECHNOLOGIST PROVIDED HISTORY: Reason for exam:->elevated creatinin Additional Contrast?->None Decision Support Exception - unselect if not a suspected or confirmed emergency medical condition->Emergency Medical Condition (MA) FINDINGS: Lung bases demonstrate patchy infiltrates in the lungs bilaterally more on the right upper lobe and right lower lobe and minimally in the left base concerning for pneumonia. There is cardiomegaly with coronary artery calcification. Grossly, the liver is of normal architecture. Gallbladder is contracted with mild wall thickening. Please correlate with ultrasonography. Spleen, pancreas, and the right adrenal gland appear normal.  2.3 x 2.4 cm left adrenal nodule is present. Kidneys are grossly within normal limits. There is calcification aorta and severe degenerative changes in the lumbar spine. Pelvis. The bladder is contracted with a Bang catheter and wall thickening. There is diffuse diverticulosis of the descending and sigmoid colon with thickening of the sigmoid colon. There is constipation. Patchy infiltrates in the lung bases more on the right side concerning for pneumonia with small pleural effusion. Diffuse diverticulosis of the left hemicolon with thickening of the sigmoid colon likely spasm or uncomplicated diverticulitis. There is no obstructive uropathy. CT CHEST WO CONTRAST    Result Date: 7/24/2021  EXAMINATION: CT OF THE CHEST WITHOUT CONTRAST 7/24/2021 3:15 pm TECHNIQUE: CT of the chest was performed without the administration of intravenous contrast. Multiplanar reformatted images are provided for review. Dose modulation, iterative reconstruction, and/or weight based adjustment of the mA/kV was utilized to reduce the radiation dose to as low as reasonably achievable. COMPARISON: Chest radiograph same day 2 hour prior CT chest September 7, 2016 HISTORY: ORDERING SYSTEM PROVIDED HISTORY: SOB TECHNOLOGIST PROVIDED HISTORY: Reason for exam:->SOB FINDINGS: Mediastinum: No abnormal lymphadenopathy. The pulmonary trunk is normal in size. Cardiomegaly.  Lungs/pleura:   Patchy consolidation seen in the bilateral lungs, right greater than

## 2021-07-27 NOTE — PROGRESS NOTES
[address] [place] [address] [facility]  [facility]  Progress Note    SUBJECTIVE:  Chief Complaint   Patient presents with    Fatigue     lethargic     Patient is tolerating medications. No nausea, vomiting, diarrhea, fevers. On 15L NRB mask - fatigued. Visitor present     Review of systems:  As stated above in the chief complaint, otherwise negative. Medications:  Scheduled Meds:   albuterol  2.5 mg Nebulization Q6H    bumetanide  1 mg Intravenous BID    aspirin  81 mg Oral Daily    meropenem  500 mg Intravenous Q12H    azithromycin  500 mg Intravenous Q24H    sodium chloride flush  5-40 mL Intravenous 2 times per day    heparin (porcine)  5,000 Units Subcutaneous 3 times per day    donepezil  5 mg Oral Daily    gabapentin  100 mg Oral Nightly    isosorbide mononitrate  30 mg Oral Daily    levothyroxine  50 mcg Oral Nightly    metoprolol tartrate  100 mg Oral BID    pantoprazole  40 mg Oral BID AC     Continuous Infusions:   sodium chloride Stopped (21)    sodium chloride       PRN Meds:acetaminophen, sodium chloride flush, sodium chloride, polyethylene glycol, trimethobenzamide, perflutren lipid microspheres    OBJECTIVE:  BP (!) 176/74   Pulse 61   Temp 98 °F (36.7 °C) (Oral)   Resp 18   Ht 5' 4\" (1.626 m)   Wt 128 lb 12.5 oz (58.4 kg)   SpO2 91%   BMI 22.11 kg/m²   Temp  Av.6 °F (36.4 °C)  Min: 97.1 °F (36.2 °C)  Max: 98 °F (36.7 °C)  Constitutional: The patient is sitting up in bed. Weak, fatigued  Skin: Warm and dry. No rashes were noted. Poor turgor   HEENT: Round and reactive pupils. Moist mucous membranes. No ulcerations or thrush. Neck: Supple to movements. Chest: No use of accessory muscles to breathe. Symmetrical expansion. No wheezing, crackles or rhonchi. Poor air exchange. 15L NRB  Cardiovascular: S1 and S2 are rhythmic and regular. No murmurs appreciated. Abdomen: Positive bowel sounds to auscultation. Benign to palpation. No masses felt. Extremities: No edema. Lines: peripheral    Laboratory and Tests Review:  Lab Results   Component Value Date    WBC 5.7 07/27/2021    WBC 8.0 07/26/2021    WBC 9.3 07/25/2021    HGB 12.1 07/27/2021    HCT 38.8 07/27/2021    MCV 99.7 07/27/2021     07/27/2021     Lab Results   Component Value Date    NEUTROABS 4.64 07/27/2021    NEUTROABS 7.20 07/26/2021    NEUTROABS 9.78 (H) 07/24/2021     No results found for: CRPHS  Lab Results   Component Value Date    ALT 64 (H) 07/27/2021    AST 57 (H) 07/27/2021    ALKPHOS 119 (H) 07/27/2021    BILITOT 0.5 07/27/2021     Lab Results   Component Value Date     07/27/2021    K 4.7 07/27/2021    K 4.2 07/25/2021     07/27/2021    CO2 27 07/27/2021    BUN 68 07/27/2021    CREATININE 1.2 07/27/2021    CREATININE 1.5 07/26/2021    CREATININE 2.2 07/25/2021    GFRAA 50 07/27/2021    LABGLOM 42 07/27/2021    GLUCOSE 85 07/27/2021    GLUCOSE 99 05/31/2012    PROT 6.4 07/27/2021    LABALBU 2.6 07/27/2021    LABALBU 2.6 05/16/2012    CALCIUM 9.5 07/27/2021    BILITOT 0.5 07/27/2021    ALKPHOS 119 07/27/2021    AST 57 07/27/2021    ALT 64 07/27/2021     No results found for: CRP  No results found for: [address]  Radiology:  Reviewed     Microbiology:   7/25/2021 Streptococcus pneumoniae/Legionella urine Ag: POSITIVE Legionella pneumophila x 2  Blood cultures 7/24/2021: negative so far  Urine culture 7/24/2021: Corynebacterium species, pansensitive E.coli   MRSA screen: sent     Recent Labs     07/24/21  1650   PROCAL 39.37*       ASSESSMENT:  · Legionella pneumonia in a patient from a nursing home -  Repeat and original urine were both +  · Possible aspiration pneumonia    PLAN:  · Continue Zithromax 500mg IV daily  · stop Merrem 500mg IV BID  · Check final cultures  · Monitor labs  · CXR today     MARLEN Rodríguez CNP   12:11 PM  7/27/2021     Pt seen and examined. Above discussed agree with advanced practice nurse. Labs, cultures, and radiographs reviewed.   Face to Face encounter occurred. Changes made as necessary.      Car Correa MD

## 2021-07-27 NOTE — PROGRESS NOTES
Physical Therapy    Facility/Department: [address] INTERMEDIATE 1      NAME: Farhat Dumont  : 1924  MRN: [id number]    Date of Service: 2021    Attempted to see pt for PT evaluation. Hold per nursing due to respiratory status.    Breanne PT [id number]

## 2021-07-27 NOTE — PROGRESS NOTES
Subjective:  Sitting upright in bed, high flow oxygen/nonrebreather on second to desaturation while  Eating lunch  Son at bedside  The patient is awake and alert. Reports poor sleep through the night  Denies chest pain, angina, abdominal pain, reports having dry harsh cough. Objective:    BP (!) 176/74   Pulse 61   Temp 98 °F (36.7 °C) (Oral)   Resp 18   Ht 5' 4\" (1.626 m)   Wt 128 lb 12.5 oz (58.4 kg)   SpO2 91%   BMI 22.11 kg/m²     In: -   Out: 200   In: -   Out: 200 [Urine:200]    General appearance: NAD, conversant  HEENT: AT/NC, MMM  Neck: FROM, supple  Lungs: Clear diminished, faint rales to bases dry harsh cough noted assessment  CV: RRR, no MRGs  Vasc: Radial pulses 2+  Abdomen: Soft, non-tender; no masses or HSM  Extremities: No peripheral edema or digital cyanosis  Skin: no rash, lesions or ulcers  Psych: Alert and oriented to person, place and time  Neuro: Alert and interactive     Recent Labs     07/25/21  0322 07/26/21  0935 07/27/21  0900   WBC 9.3 8.0 5.7   HGB 10.3* 11.1* 12.1   HCT 32.6* 35.4 38.8    197 215       Recent Labs     07/25/21  0322 07/26/21  0935 07/27/21  0900    140 139   K 4.2 4.2 4.7    105 104   CO2 29 26 27   BUN 78* 76* 68*   CREATININE 2.2* 1.5* 1.2*   CALCIUM 8.8 9.1 9.5       Assessment:    Principal Problem:    Acute respiratory failure with hypoxia (HCC)  Active Problems:    Elevated troponin    Essential hypertension    Paroxysmal atrial fibrillation (HCC)    Sepsis (HCC)    Hypothyroidism    CAD (coronary artery disease)    VALERIE (acute kidney injury) (HCC)    Chronic CHF (congestive heart failure) (HCC)    Elevated brain natriuretic peptide (BNP) level    Pneumonia of both lower lobes due to infectious organism    Chronic anemia    Elevated liver enzymes    Pleural effusion  Resolved Problems:    * No resolved hospital problems.  *      Plan:  Acute respiratory failure with hypoxia  Continue oxygen to maintain pulse ox greater than 92%  Multifocal pneumonia-continue IV antibiotics meropenem and azithromycin  Will obtain repeat chest x-ray today per nephrology recommendations  Laboratory values showing proBNP elevated at 13,000 will resume diuretics, will start with Bumex 1 mg IV twice daily at home patient normally on Lasix 40 mg twice daily  2D echo-from July 25, 2020--Summary   Ejection fraction is visually estimated at 65%. No regional wall motion abnormalities seen. Dilated right ventricle with reduced function. TAPSE is 1.6 cm. Right   ventricle pacemaker lead noted. Left atrial volume index of 66 ml per meters squared BSA. Mild aortic stenosis is present. Physiologic and/or trace aortic regurgitation is noted. Mild to moderate mitral regurgitation is present. Moderate to severe tricuspid regurgitation. Pulmonary hypertension is moderate. RVSP is 52 mmHg    Acute kidney injury-  today BUN and creatinine 68 and 1.2  Avoid nephrotoxins  IV fluids discontinued    A. Fib  Rate controlled  V paced  No anticoagulation due to history of GI bleeds    Dietary consulted due to questionable aspiration pneumonia  Plan will be to continue current diet will start oral nutrition supplement twice daily      DVT Prophylaxis   PT/OT  Discharge planning     MARLEN Parker - CNP  11:32 AM  7/27/2021     I personally saw, examined and provided care for the patient. Radiographs, labs and medication list were reviewed by me independently. The case was discussed in detail and plans for care were established. Review of [employer],  documentation was conducted and revisions were made as appropriate directly by me. I agree with the above documented exam, problem list, and plan of care.        Violet Wahl MD  6:11 PM  7/27/2021

## 2021-07-28 NOTE — CARE COORDINATION
Social Work/Discharge Planning:  Chart reviewed. Patient requiring 15 liters high flow oxygen and RN to wean as tolerated. Patient admitted from [place] and no pre-cert needed at discharge. [place] maximum amount of oxygen facility can accommodate is ten liters. Will continue to follow.   Electronically signed by SHERYL Marshall on 7/28/2021 at 12:47 PM

## 2021-07-28 NOTE — PROGRESS NOTES
examined. Above discussed agree with advanced practice nurse. Labs, cultures, and radiographs reviewed. Face to Face encounter occurred. Changes made as necessary.      [place] Danielle MD  7/28/2021

## 2021-07-28 NOTE — PROGRESS NOTES
Nephrology progress Note    Patient's Name: Kimberly Lorenzo  10:48 AM  7/28/2021    Nephrologist: Dr. Rom Ibanez MD    Reason for Consult: Renal is consulted for acute kidney injury  Requesting Physician:  Gilles Jose DO    Chief Complaint: Shortness of breath    History Obtained From: Patient's daughterand EMR    History of Present Ilness:    Kimberly Lorenzo is a 80 y.o. female with past medical history of hypertension, hypothyroidism, coronary artery disease, atrial fibrillation with a pacemaker, congestive heart failure with preserved ejection fraction, presented from his skilled nursing facility for severe lethargy, weakness and hypoxia. In the ER she was hypoxic, labs revealed a creatinine of 2.2, BUN of 78, elevated troponin, lactic acidosis with a lactate of 2.3, elevated procalcitonin, CT chest without IV contrast showed right lung multifocal pneumonia, CT abdomen and pelvis showed no evidence of hydronephrosis, and a left adrenal nodule measuring 2.3 cm. Previous labs in our system from 2019 show a normal creatinine of 0.7-0.8  Pertinent home medications include losartan 100 mg p.o. daily, furosemide 40 mg p.o. twice daily, potassium chloride 10 mEq p.o. daily.     As per the daughter at bedside she was not eating much over the past few weeks  She was recently started on doxycycline for a urinary tract infection  Her blood pressure on admission was low around 100/48    Sub: 7/27:  Patient seen and examined bedside  Blood pressure trend is much improved  She is much more awake, alert, sitting in bed eating her breakfast  Events reviewed with the RN, she has hypoxia with O2 sat in 80s  Sub: 7/28:  Patient seen and examined bedside, daughter states she is very confused today, seeing people and hearing things in the hallway  She is out of bed, sitting in chair eating lunch  FiO2 requirement is at 14 L via nasal cannula      Past Medical History:   Diagnosis Date    [facility]     Acute respiratory failure with hypoxia ([facility] 12/31/2017    VALERIE (acute kidney injury) ([facility] 7/24/2021    Arthritis     Atrial fibrillation (HCC)     Back pain 4-5 years    CAD (coronary artery disease)     Chronic diastolic CHF [facility]congestive [facility] failure) [facility] 11/17/2017    Difficulty walking 4-5 years    Hypertension     Hypothyroidism     Osteoarthritis     Pneumonia due to organism 12/30/2017    Rheumatoid arthritis ([facility] 5/4/2012    Spinal stenosis     Urinary tract infection, acute        Past Surgical History:   Procedure Laterality Date    APPENDECTOMY      CATARACT REMOVAL  2007    COLONOSCOPY      ECHO COMPLETE  5/7/2012         EYE SURGERY      FEMUR SURGERY  5/5/12     IM RODDING LEFT FEMUR    FRACTURE SURGERY      HERNIA REPAIR  2008    HYSTERECTOMY  1968    JOINT REPLACEMENT  1992    left knee    JOINT REPLACEMENT  2000    right knee    PACEMAKER PLACEMENT  2009    PACEMAKER PLACEMENT  02/21/2017    Dual chamber [employer] pgr    TONSILLECTOMY      UPPER GASTROINTESTINAL ENDOSCOPY N/A 11/18/2019    EGD BIOPSY performed by Len Sun MD at [facility] ENDOSCOPY       Family History   Problem Relation Age of Onset    Stroke Father     Cancer Sister     Cancer Brother     Cancer Daughter         reports that she has never smoked. She has never used smokeless tobacco. She reports that she does not drink alcohol and does not use drugs.     Allergies:  Penicillins and Sulfa antibiotics    Current Medications:    albuterol (PROVENTIL) nebulizer solution 2.5 mg, Q6H  bumetanide (BUMEX) injection 1 mg, BID  acetaminophen (TYLENOL) tablet 650 mg, Q6H PRN  aspirin EC tablet 81 mg, Daily  azithromycin (ZITHROMAX) 500 mg in D5W 250ml addavial, Q24H  sodium chloride flush 0.9 % injection 5-40 mL, 2 times per day  sodium chloride flush 0.9 % injection 5-40 mL, PRN  0.9 % sodium chloride infusion, PRN  heparin (porcine) injection 5,000 Units, 3 times per day  polyethylene glycol (GLYCOLAX) packet 17 g, Daily PRN  trimethobenzamide (TIGAN) injection 200 mg, Q6H PRN  donepezil (ARICEPT) tablet 5 mg, Daily  gabapentin (NEURONTIN) capsule 100 mg, Nightly  isosorbide mononitrate (IMDUR) extended release tablet 30 mg, Daily  levothyroxine (SYNTHROID) tablet 50 mcg, Nightly  metoprolol tartrate (LOPRESSOR) tablet 100 mg, BID  pantoprazole (PROTONIX) tablet 40 mg, BID AC  perflutren lipid microspheres (DEFINITY) injection 1.65 mg, ONCE PRN        Review of Systems:   10 point review of systems provided by her daughter are negative except for the things mentioned in the HPI    Physical exam:   Constitutional:    Vitals: /68   Pulse 62   Temp 97.9 °F (36.6 °C) (Oral)   Resp 18   Ht 5' 4\" (1.626 m)   Wt 130 lb (59 kg)   SpO2 98%   BMI 22.31 kg/m²      Elderly frail looking  female, much more alert awake, oriented to person today, out of bed sitting in chair eating lunch, FiO2 requirement is at 14 L via nasal cannula   Heent:  eomi, mucous membranes are moist  Neck: no bruits or jvd noted  Cardiovascular:  S1, S2 without m/r/g  Respiratory: CTA B without w/r/r  Abdomen:  +bs, soft, nt, nd, Bang in place draining clear urine  Ext: No evidence of lower extremity edema  Psychiatric: mood and affect appropriate  Musculoskeletal:  Rom, muscular strength intact    Data:   Labs:  CBC:   Lab Results   Component Value Date    WBC 6.6 07/28/2021    RBC 3.93 07/28/2021    HGB 12.4 07/28/2021    HCT 40.0 07/28/2021    .8 07/28/2021    MCH 31.6 07/28/2021    MCHC 31.0 07/28/2021    RDW 14.7 07/28/2021     07/28/2021    MPV 12.6 07/28/2021     CMP:    Lab Results   Component Value Date     07/28/2021    K 4.4 07/28/2021    K 4.2 07/25/2021     07/28/2021    CO2 32 07/28/2021    BUN 58 07/28/2021    CREATININE 1.1 07/28/2021    GFRAA 56 07/28/2021    LABGLOM 46 07/28/2021    GLUCOSE 119 07/28/2021    GLUCOSE 99 05/31/2012    PROT 6.8 07/28/2021    LABALBU 2.9 07/28/2021    LABALBU 2.6 05/16/2012    CALCIUM 9.4 07/28/2021    BILITOT 0.6 07/28/2021    ALKPHOS 136 07/28/2021    AST 53 07/28/2021    ALT 56 07/28/2021     BMP:    Lab Results   Component Value Date     07/28/2021    K 4.4 07/28/2021    K 4.2 07/25/2021     07/28/2021    CO2 32 07/28/2021    BUN 58 07/28/2021    LABALBU 2.9 07/28/2021    LABALBU 2.6 05/16/2012    CREATININE 1.1 07/28/2021    CALCIUM 9.4 07/28/2021    GFRAA 56 07/28/2021    LABGLOM 46 07/28/2021    GLUCOSE 119 07/28/2021    GLUCOSE 99 05/31/2012     Calcium:    Lab Results   Component Value Date    CALCIUM 9.4 07/28/2021     Phosphorus:    Lab Results   Component Value Date    PHOS 2.2 07/28/2021     Uric Acid:  No results found for: URICACID  U/A:    Lab Results   Component Value Date    NITRU Negative 07/24/2021    COLORU DARK YELLOW 07/24/2021    PHUR 5.0 07/24/2021    WBCUA 5-10 07/24/2021    WBCUA >20 05/25/2012    RBCUA 2-5 07/24/2021    RBCUA 0-1 12/18/2013    BACTERIA MANY 07/24/2021    CLARITYU Clear 07/24/2021    SPECGRAV >=1.030 07/24/2021    LEUKOCYTESUR TRACE 07/24/2021    UROBILINOGEN 1.0 07/24/2021    BILIRUBINUR SMALL 07/24/2021    BILIRUBINUR NEGATIVE 05/25/2012    BLOODU TRACE-INTACT 07/24/2021    GLUCOSEU Negative 07/24/2021    GLUCOSEU NEGATIVE 05/25/2012    KETUA Negative 07/24/2021        Imaging:  CT ABDOMEN PELVIS WO CONTRAST Additional Contrast? None    Result Date: 7/24/2021  EXAMINATION: CT OF THE ABDOMEN AND PELVIS WITHOUT CONTRAST 7/24/2021 3:15 pm TECHNIQUE: CT of the abdomen and pelvis was performed without the administration of intravenous contrast. Multiplanar reformatted images are provided for review. Dose modulation, iterative reconstruction, and/or weight based adjustment of the mA/kV was utilized to reduce the radiation dose to as low as reasonably achievable. COMPARISON: None.  HISTORY: ORDERING SYSTEM PROVIDED HISTORY: elevated creatinin TECHNOLOGIST PROVIDED HISTORY: Reason for exam:->elevated creatinin Additional Contrast?->None Decision Support Exception - unselect if not a suspected or confirmed emergency medical condition->Emergency Medical Condition (MA) FINDINGS: Lung bases demonstrate patchy infiltrates in the lungs bilaterally more on the right upper lobe and right lower lobe and minimally in the left base concerning for pneumonia. There is cardiomegaly with coronary artery calcification. Grossly, the liver is of normal architecture. Gallbladder is contracted with mild wall thickening. Please correlate with ultrasonography. Spleen, pancreas, and the right adrenal gland appear normal.  2.3 x 2.4 cm left adrenal nodule is present. Kidneys are grossly within normal limits. There is calcification aorta and severe degenerative changes in the lumbar spine. Pelvis. The bladder is contracted with a Bang catheter and wall thickening. There is diffuse diverticulosis of the descending and sigmoid colon with thickening of the sigmoid colon. There is constipation. Patchy infiltrates in the lung bases more on the right side concerning for pneumonia with small pleural effusion. Diffuse diverticulosis of the left hemicolon with thickening of the sigmoid colon likely spasm or uncomplicated diverticulitis. There is no obstructive uropathy. CT CHEST WO CONTRAST    Result Date: 7/24/2021  EXAMINATION: CT OF THE CHEST WITHOUT CONTRAST 7/24/2021 3:15 pm TECHNIQUE: CT of the chest was performed without the administration of intravenous contrast. Multiplanar reformatted images are provided for review. Dose modulation, iterative reconstruction, and/or weight based adjustment of the mA/kV was utilized to reduce the radiation dose to as low as reasonably achievable. COMPARISON: Chest radiograph same day 2 hour prior CT chest September 7, 2016 HISTORY: ORDERING SYSTEM PROVIDED HISTORY: SOB TECHNOLOGIST PROVIDED HISTORY: Reason for exam:->SOB FINDINGS: Mediastinum: No abnormal lymphadenopathy.   The pulmonary trunk is normal in size. Cardiomegaly. Lungs/pleura:   Patchy consolidation seen in the bilateral lungs, right greater than left. There are air bronchograms identified. No pleural effusion. No pneumothorax. Upper Abdomen: No acute process identified Soft Tissues/Bones: No aggressive osseous lesions. Scattered bilateral consolidations most prominent in the right lung is concerning for multifocal pneumonia. Follow-up CT of the chest may be obtained in 6-8 weeks after completion of therapy. XR CHEST 1 VIEW    Result Date: 7/24/2021  EXAMINATION: ONE XRAY VIEW OF THE CHEST 7/24/2021 1:25 pm COMPARISON: Chest radiograph June 21, 2019 HISTORY: ORDERING SYSTEM PROVIDED HISTORY: chest pain TECHNOLOGIST PROVIDED HISTORY: Reason for exam:->chest pain FINDINGS: Left-sided cardiac pacing device identified. Cardiac silhouette is enlarged but stable in size. There is focal right upper lobe opacity. Retrocardiac opacity also identified. No sizable pleural effusions. No pneumothorax. Right upper lobe opacity may represent pulmonary edema or pneumonia in the proper clinical setting. Consolidation in retrocardiac region may represent atelectasis versus pneumonia. Further evaluation with CT chest may be obtained if clinically warranted. Assessment  1. Acute kidney injury secondary to severe prerenal azotemia from sepsis secondary to multifocal pneumonia and a urinary tract infection, continued use of Lasix, spironolactone and losartan in this setting. Urine electrolytes reviewed and calculated fractional excretion of sodium is 0.2% strongly consistent with prerenal injury  2. Severe sepsis secondary to multifocal pneumonia and a urinary tract infection  3. Congestive heart failure with preserved ejection fraction, appears well compensated on exam  4. Elevated troponin    Plan  1. Renal function has returned to her baseline  2.   Agree with Bumex 1 mg IV twice daily  Discussed with her daughter present at

## 2021-07-28 NOTE — PROGRESS NOTES
Subjective:  Sitting in chair, just completed breakfast, remains on high flow nasal cannula, 15 L, continuous pulse ox 94% heart rate 63  The patient is awake and alert. No acute events overnight. Denies chest pain, angina, SOB     Objective:    /68   Pulse 62   Temp 97.9 °F (36.6 °C) (Oral)   Resp 18   Ht 5' 4\" (1.626 m)   Wt 130 lb (59 kg)   SpO2 98%   BMI 22.31 kg/m²     In: 340 [P.O.:340]  Out: 1000   In: 340   Out: 1000 [Urine:1000]    General appearance: NAD, conversant, alert and oriented x2-3, pleasantly confused  HEENT: AT/NC, MMM  Neck: FROM, supple  Lungs: Clear to upper lobes, faint rales to bases  CV: RRR, no MRGs  Vasc: Radial pulses 2+  Abdomen: Soft, non-tender; no masses or HSM  Extremities: No peripheral edema or digital cyanosis  Skin: no rash, lesions or ulcers  Psych: Alert and oriented to person, place and time  Neuro: Alert and interactive     Recent Labs     07/26/21  0935 07/27/21  0900 07/28/21  0610   WBC 8.0 5.7 6.6   HGB 11.1* 12.1 12.4   HCT 35.4 38.8 40.0    215 220       Recent Labs     07/26/21  0935 07/27/21  0900 07/28/21  0610    139 142   K 4.2 4.7 4.4    104 102   CO2 26 27 32*   BUN 76* 68* 58*   CREATININE 1.5* 1.2* 1.1*   CALCIUM 9.1 9.5 9.4       Assessment:    Principal Problem:    Acute respiratory failure with hypoxia (HCC)  Active Problems:    Elevated troponin    Essential hypertension    Paroxysmal atrial fibrillation (HCC)    Sepsis (HCC)    Hypothyroidism    CAD (coronary artery disease)    VALERIE (acute kidney injury) (HCC)    Chronic CHF (congestive heart failure) (HCC)    Elevated brain natriuretic peptide (BNP) level    Pneumonia of both lower lobes due to infectious organism    Chronic anemia    Elevated liver enzymes    Pleural effusion  Resolved Problems:    * No resolved hospital problems.  *      Plan:  Acute respiratory failure with hypoxia  Continue oxygen to maintain pulse ox greater than 92%, currently on high flow nasal cannula 15 L, pulse ox 94%  Multifocal pneumonia-continue IV antibiotic azithromycin per infectious disease team,   Likely also component of aspiration  chest x-ray July 20, 2021-  Right lung infiltrates with improved aeration of the right upper lobe   compared to 07/24/2021     Infectious disease on consult plan will be to continue IV antibiotics as scheduled  ABG with hypercarbic respiratory failure-  BiPAP  Stat chest x-ray  Worsening mentation/lethargy today    Congestive heart failure-   Laboratory values showing proBNP elevated , today 19,000 resume diuretics, IV Bumex 1 mg IV twice daily resumed at home patient normally on Lasix 40 mg twice daily    2D echo-from July 25, 2020--Summary   Ejection fraction is visually estimated at 65%.   No regional wall motion abnormalities seen.   Dilated right ventricle with reduced function. TAPSE is 1.6 cm. Right   ventricle pacemaker lead noted.   Left atrial volume index of 66 ml per meters squared BSA.   Mild aortic stenosis is present.   Physiologic and/or trace aortic regurgitation is noted.   Mild to moderate mitral regurgitation is present.   Moderate to severe tricuspid regurgitation.   Pulmonary hypertension is moderate. RVSP is 52 mmHg    Acute kidney injury  improvement continues, BUN 50, creatinine 1.1, yesterday creatinine 1.2  Continue Bumex 1 mg IV twice daily  Renal following      A. Fib  Rate controlled  V paced  No anticoagulation due to history of GI bleeds        DVT Prophylaxis   PT/OT  Discharge planning     Edith Ulrich, MARLEN - CNP  9:57 AM  7/28/2021     On my evaluation, she is not horribly lethargic  Opens eyes to command  ABG reviewed with mild elevation in PCO2 at 53  BiPAP 12/6 to start  Discussed with family at bedside  No intubation if patient's condition were to deteriorate-comfort measures only partly undertaken  Continue antibiotic therapy per ID for Legionella pneumonia      I personally saw, examined and provided care for the patient. Radiographs, labs and medication list were reviewed by me independently. The case was discussed in detail and plans for care were established. Review of [employer],  documentation was conducted and revisions were made as appropriate directly by me. I agree with the above documented exam, problem list, and plan of care.      Mima Lee MD   7/28/2021

## 2021-07-28 NOTE — PROGRESS NOTES
Physical Therapy    Facility/Department: [address] INTERMEDIATE 1  Initial Assessment    NAME: Oz Ornelas  : 1924  MRN: [id number]    Date of Service: 2021  Patient Diagnosis(es): The primary encounter diagnosis was Pneumonia of both lower lobes due to infectious organism. Diagnoses of Elevated troponin and Generalized weakness were also pertinent to this visit. has a past medical history of Acute on chronic congestive heart failure (HCC), Acute respiratory failure with hypoxia ([address] VALERIE (acute kidney injury) ([address] Arthritis, Atrial fibrillation ([address] Back pain, CAD (coronary artery disease), Chronic diastolic CHF (congestive heart failure) ([address] Difficulty walking, Hypertension, Hypothyroidism, Osteoarthritis, Pneumonia due to organism, Rheumatoid arthritis ([address] Spinal stenosis, and Urinary tract infection, acute. has a past surgical history that includes hernia repair (); Hysterectomy (); joint replacement (); joint replacement (); Cataract removal (); Femur Surgery (12); Echo Complete (2012); fracture surgery; Tonsillectomy; Appendectomy; Colonoscopy; eye surgery; pacemaker placement (); pacemaker placement (2017); and Upper gastrointestinal endoscopy (N/A, 2019). Evaluating Therapist: Lalo Jones PT      Room #:  3796/8164-L  Diagnosis:  Acute respiratory failure with hypoxia ([facility] [J96.01]  PMHx/PSHx:  CHF, CAD, RA  Precautions:  Falls, alarm, high flow O2      Social:  Pt has been at [place] [id number] since May. Prior lived with  in a 1 floor condo and ambulated with ww     Initial Evaluation  Date: 21 Treatment      Short Term/ Long Term   Goals   Was pt agreeable to Eval/treatment? yes     Does pt have pain?  L arm pain     Bed Mobility  Rolling: mod assist  Supine to sit: mod assist  Sit to supine: NT  Scooting: mod assist  Min assist   Transfers Sit to stand: max assist of 2  Stand to sit: max assist of 2  Stand pivot: max assist of 2  Mod assist   Ambulation    Pt unable   10 feet with ww with mod assist   Stair Negotiation  Ascended and descended  NT   N/A   LE strength     3/5    4+/5   balance      Sitting balance SBA standing max assist     AM-PAC Raw score               9/24         Pt is alert, confusion noted  LE ROM: WFL  Sensation: intact  Edema: none  Endurance: fair-  Chair alarm: yes     ASSESSMENT:    Pt displays functional ability as noted in the objective portion of this evaluation. Patient education  Pt educated on transfer technique    Patient response to education:   Pt verbalized understanding Pt demonstrated skill Pt requires further education in this area   no With assist yes       Comments:  Pt in bed and agreeable to PT session. Instructed in transfer technique. Posterior lean in standing. Max assist of 2 to stand to walker. Max assist of 2 to pivot to chair without walker. Pt does not fully stand with pivot transfer. SpO2 on 15L during session 92-93%    Pt's/ family goals   1. To get stronger    Conditions Requiring Skilled Therapeutic Intervention:    [x]Decreased strength     []Decreased ROM  [x]Decreased functional mobility  []Decreased balance   [x]Decreased endurance   []Decreased posture  []Decreased sensation  []Decreased coordination   []Decreased vision  []Decreased safety awareness   [x]Increased pain       Patient and or family understand(s) diagnosis, prognosis, and plan of care.     Prognosis is fair for reaching above PT goals    PHYSICAL THERAPY PLAN OF CARE:    PT POC is established based on physician order and patient diagnosis     Referring provider/PT Order: MARLEN Newton CNP / PT eval and treat      Current Treatment Recommendations:     [x] Strengthening to improve independence with functional mobility   [] ROM to improve independence with functional mobility   [x] Balance Training to improve static/dynamic balance and to reduce fall risk  [x] Endurance Training to improve activity tolerance during functional mobility   [x] Transfer Training to improve safety and independence with all functional transfers   [x] Gait Training to improve gait mechanics, endurance and assess need for appropriate assistive device  [] Stair Training in preparation for safe discharge home and/or into the community   [] Positioning to prevent skin breakdown and contractures  [x] Safety and Education Training   [x] Patient/Caregiver Education   [] HEP  [] Other     PT long term treatment goals are located in above grid    Frequency of treatments: 2-5x/week x 5-7 days. Time in  0815  Time out  0830        Evaluation Time includes thorough review of current medical information, gathering information on past medical history/social history and prior level of function, completion of standardized testing/informal observation of tasks, assessment of data and education on plan of care and goals.       CPT codes:  [x] Low Complexity PT evaluation 77600  [] Moderate Complexity PT evaluation 78872  [] High Complexity PT evaluation 07727  [] PT Re-evaluation 44041  [] Gait training 83048 minutes  [] Manual therapy 24854 minutes  [] Therapeutic activities 95932 minutes  [] Therapeutic exercises 39382 minutes  [] Neuromuscular reeducation 42655 minutes     [facility] PT [id number]

## 2021-07-28 NOTE — PROGRESS NOTES
Occupational Therapy  OT BEDSIDE TREATMENT NOTE      Date:2021  Patient Name: Yuri Dumas  MRN: [id number]  : 1924  Room: [address] [place] [address]     Evaluating OT: Milena Geller OTR/L   [id number]       Referring Provider:MARLEN Newton CNP    Specific Provider Orders/Date:OT eval and treat 2021       Diagnosis: acute resp failure with hypoxia       Pertinent Medical History: CHF, dementia, RA, pacemaker, spinal stenosis - per daughter - patient fell   years ago and injured L shoulder and has been painful since      Precautions:  Fall Risk, alarm, [place], L shoulder rotator injury/painful, high flow O2       Assessment of current deficits    [x]? Functional mobility            [x]?ADLs           [x]? Strength                  [x]? Cognition    [x]? Functional transfers          [x]? IADLs         [x]? Safety Awareness   [x]? Endurance    []? Fine Coordination                         [x]? Balance      []? Vision/perception   []? Sensation      []? Gross Motor Coordination             []? ROM           []? Delirium                   []? Motor Control      OT PLAN OF CARE   OT POC based on physician orders, patient diagnosis and results of clinical assessment     Frequency/Duration  2-4 days/wk for 2 weeks PRN   Specific OT Treatment Interventions to include:   ADL retraining/adapted techniques and AE recommendations to increase functional independence within precautions                    Energy conservation techniques to improve tolerance for selfcare routine   Functional transfer/mobility training/DME recommendations for increased independence, safety and fall prevention         Patient/family education to increase safety and functional independence             Environmental modifications for safe mobility and completion of ADLs                             Therapeutic activity to improve functional performance during ADLs.                                          Therapeutic exercise to improve tolerance and functional strength for ADLs    Balance retraining/tolerance tasks for facilitation of postural control with dynamic challenges during ADLs .       Positioning to improve functional independence     Recommended Adaptive Equipment: TBD      SOCIAL:  Patient's daughter reports - patient has been in [facility] since end of May. Had assist with ADLs, walking short distances with walker and assist   Prior: Home Living: Pt lives ,, 1 floor condo.  would assist with ADLs, ambulated with walker        Pain Level: Chronic L shoulder pain- Moderate  Cognition: Alert and conversing with cues for safety provided                Functional Assessment:  AM-PAC Daily Activity Raw Score: 11/24    Initial Eval Status  Date: 7/26/21 Treatment Status  Date:7/28/21 STGs = LTGs  Time frame: 10-14 days   Feeding Mod A  Daughter assisting with holding cup and bringing it to patient's mouth    SBA/set-up    Grooming Max A,seated   Decrease standing balance/tolerance    Min A   UB Dressing Max A    Min A   LB Dressing Max A/dependent  Max A  To don socks    Mod a    Bathing Max  A   Mod A    Toileting Dependent    Mod A    Bed Mobility  Mod A  Supine to sit  Supine to sit: Mod A Min A   Functional Transfers Max Ax2  Sit-stand from bed , chair   STS: Max A x2  From EOB and chair    SPT: Max Ax2 from bed to chair without AD Mod A    Functional Mobility Mod Ax2  SPT from bed to chair only  Overall decrease balance/tolerance    Mod A  with good tolerance    Balance Sitting:     Static:  CGA/SBA- EOB     Dynamic:Max  A  Standing: Max A   Sitting: CGA  Standing:  Max Ax2 Supervision - sititng   Standing - Min A   Activity Tolerance Fair - with light activity   Patient on 15 L O2   O2 sats ranged  Lowest 88% briefly  To 91-92%   Left sitting in chair 93%   No signs or complaints of SOB   Poor standing tolerance Fair + with ADL activity    Visual/  Perceptual Glasses: yes                           Treatment: Upon arrival pt was supine in bed.  Extended time and rest breaks required during tx. Pt performed static standing at ww but was unable to advance B LE forward for SPT; therefore, arm in arm assistance was provided. At end of session pt was transferred to chair with alarm on, all lines and tubes intact and call light within reach. Education: Balance training, benefits of OOB activity and pt/ staff transfer training to prevent falls during future ADLs. · Pt has made good progress towards set goals.        Treatment Charges: Mins Units   Ther Ex  97039     Manual Therapy 18268     Thera Activities 66721 10 1   ADL/Home Mgt 25790 6 0   Neuro Re-ed 14582     Group Therapy      Orthotic manage/training  00865     Non-Billable Time       Time In: 8:10  Time Out: 8:26  Total Time: Tc [place] 66 PATRICIO/L [id number]

## 2021-07-28 NOTE — PROGRESS NOTES
Speech Language Pathology      NAME:  Juan Davila  :  1924  DATE: 2021  ROOM:  3768/0529-J    Order received. Chart reviewed. Attempted to scheduled MBSS this date. Pt unavailable at this time due to:  [x] Radiology reporting Pt is a HOLD per RN, Pt not appropriate for MBSS this date. [] Off unit for testing/ procedure    [] With medical staff   [] Declined intervention  [] Sleeping/ Lethargic   [] Other:     Will re-attempt later this date as able. Thank you.        Acute respiratory failure with hypoxia ([facility] [address] [J96.01]

## 2021-07-28 NOTE — PROGRESS NOTES
Date: 7/28/2021    Time: 5:46 PM    Patient Placed On BIPAP/CPAP/ Non-Invasive Ventilation? Yes    If no must comment. Facial area red/color change? No           If YES are Blister/Lesion present? No   If yes must notify nursing staff  BIPAP/CPAP skin barrier?   Yes    Skin barrier type:mepilexlite       Comments:        Maximiliano Vivas RCP

## 2021-07-29 NOTE — PROGRESS NOTES
Speech Language Pathology      NAME:  Pilar Perla  :  1924  DATE: 2021  ROOM:  [address] [place] [address]    Attempted to complete video swallow  and . Per RN pt not appropriate due to respiratory status so not completed. However on chart review pt on a regular diet that she has been inconsistently consuming. Discussed with current RN. If pt inappropriate for video swallow eval, recommend physician order a clinical swallow eval at this time to assess for safety of oral diet. Acute respiratory failure with hypoxia ([facility] [address] [J96.01]        Raoul DORADO CCC/SLP [id number]  Speech-Language Pathologist

## 2021-07-29 NOTE — PROGRESS NOTES
Goals:    Short Term Goals:  Pt will participate in ongoing evaluation of swallow function to determine when PO diet can be safely initiated    Long Term Goals:   Pt will maintain adequate nutrition/hydration via PO intake of the least restrictive oral diet with implementation of safe swallow/ compensatory strategies and decrease signs/symptoms of aspiration to less than 1 x/day. Patient/family Goal:    To be able to eat/drink again    Plan of care discussed with Patient and Family   The Family understand(s) the diagnosis, prognosis and plan of care     Rehabilitation Potential/Prognosis: fair                    ADMITTING DIAGNOSIS: Acute respiratory failure with hypoxia ([facility] [address] [J96.01]    VISIT DIAGNOSIS:   Visit Diagnoses       Codes    Generalized weakness     R53.1           PATIENT REPORT/COMPLAINT: Pt confused at time of evaluation,  denies difficulty swallowing however did state the speech therapy was treating pt prior to admit. He was unsure if for speech/cognition or swallowing.     RN cleared patient for participation in assessment     yes     PRIOR LEVEL OF SWALLOW FUNCTION:    PAST HISTORY OF DYSPHAGIA?: none reported    Diet during hospital admission: Regular consistency solids with thin liquids    PROCEDURE:  Consistencies Administered During the Evaluation   Liquids: thin liquid   Solids:  pureed foods      Method of Intake:   spoon  Self fed      Position:   Seated, upright    CLINICAL ASSESSMENT:  Oral Stage:       Delayed A-P transit due to: reduced lingual strength  and cognitive function       Pharyngeal Stage:    Immediate wet cough was noted after presentation of all consistencies administered  Multiple swallows were noted after presentation of all consistencies administered  Delayed initiation of the pharyngeal swallow is suspected    Cognition:   Confusion noted    Oral Peripheral Examination   Generalized oral weakness    Current Respiratory Status    13 liters high flow nasal cannula     Parameters of Speech Production  Respiration:  Adequate for speech production  Quality:   Breathy  Intensity: Quiet    Volitional Swallow: absent     Volitional Cough:   absent     Pain: No pain reported. EDUCATION:   The Speech Language Pathologist (SLP) completed education regarding results of evaluation and that intervention is warranted at this time. Learner: Patient and Family  Education: Reviewed results and recommendations of this evaluation  Evaluation of Education:  Carlos Sprague understanding    This plan may be re-evaluated and revised as warranted. Evaluation Time includes thorough review of current medical information, gathering information on past medical history/social history and prior level of function, completion of standardized testing/informal observation of tasks, assessment of data and education on plan of care and goals. [x]The admitting diagnosis and active problem list, have been reviewed prior to initiation of this evaluation. INTERVENTION    Pt/family educated on above results and plan of care. Pt/family trained on compensatory strategies for safe swallow and signs and symptoms of aspiration (throat clearing, coughing/choking, wet vocal quality, etc.). Handouts provided as warranted. Pt/family encouraged to engage in question/answer session. All questions answered and pt/family verbalized understanding of above.            ACTIVE PROBLEM LIST:   Patient Active Problem List   Diagnosis    At risk for falling    Essential hypertension    ASHD (arteriosclerotic heart disease)    Rheumatoid arthritis ([facility] [address]    History of TIA (transient ischemic attack)    Paroxysmal atrial fibrillation (HCC)    S/P cardiac pacemaker procedure    Chronic diastolic CHF (congestive heart failure) (HCC)    Sepsis (HCC)    Elevated troponin    Tinea unguium    Ingrown nail    Peripheral vascular disease, unspecified (HCC)    Pain in toe of right foot    Pain in left toe(s)    Difficulty walking    Corn or callus    Pressure injury of left foot, unstageable (HCC)    Acute gastric ulcer with hemorrhage    Osteoarthritis    Hypothyroidism    Hypertension    CAD (coronary artery disease)    Acute blood loss anemia    Acute respiratory failure with hypoxia (HCC)    VALERIE (acute kidney injury) (HCC)    Chronic CHF (congestive heart failure) (HCC)    Elevated brain natriuretic peptide (BNP) level    Pneumonia of both lower lobes due to infectious organism    Chronic anemia    Elevated liver enzymes    Pleural effusion         CPT code:  28705  bedside swallow eval, 56546 dysphagia therapy    Beni DORADO CCC/SLP [id number]  Speech-Language Pathologist

## 2021-07-29 NOTE — CARE COORDINATION
Social Work/Discharge Planning:  Hospice consult noted. Met with patient  Mauro Logan, son Michael Johnson and family and confirmed they prefer to meet with [facility]. Referral made to Quoc Barclay with  Venice Hernandez. Will continue to follow.   Electronically signed by SHERYL Costello on 7/29/2021 at 3:45 PM

## 2021-07-29 NOTE — PROGRESS NOTES
Subjective:    Patient resting on exam continues to wear 15 L high flow  No acute events overnight. Denies chest pain, angina, SOB     Objective:    BP (!) 105/53   Pulse 64   Temp 98.7 °F (37.1 °C) (Axillary)   Resp 18   Ht 5' 4\" (1.626 m)   Wt 140 lb 1.6 oz (63.5 kg)   SpO2 94%   BMI 24.05 kg/m²     In: 120 [P.O.:120]  Out: 2050   In: 120   Out: 2050 [Urine:2050]    General appearance: NAD, conversant, ill-appearing  HEENT: AT/NC, MMM  Neck: FROM, supple  Lungs: Clear to upper lobes, diminished bilateral lower lobes  CV: RRR, no MRGs  Vasc: Radial pulses 2+  Abdomen: Soft, non-tender; no masses or HSM  Extremities: No peripheral edema or digital cyanosis  Skin: no rash, lesions or ulcers  Psych: Alert and oriented to person, place and time  Neuro: Alert and interactive     Recent Labs     07/27/21  0900 07/28/21  0610 07/29/21  0550   WBC 5.7 6.6 6.9   HGB 12.1 12.4 11.6   HCT 38.8 40.0 36.6    220 187       Recent Labs     07/27/21  0900 07/28/21  0610 07/29/21  0550    142 145   K 4.7 4.4 3.7    102 104   CO2 27 32* 34*   BUN 68* 58* 53*   CREATININE 1.2* 1.1* 1.0   CALCIUM 9.5 9.4 9.0       Assessment:    Principal Problem:    Acute respiratory failure with hypoxia (HCC)  Active Problems:    Elevated troponin    Essential hypertension    Paroxysmal atrial fibrillation (HCC)    Sepsis (HCC)    Hypothyroidism    CAD (coronary artery disease)    VALERIE (acute kidney injury) (HCC)    Chronic CHF (congestive heart failure) (HCC)    Elevated brain natriuretic peptide (BNP) level    Pneumonia of both lower lobes due to infectious organism    Chronic anemia    Elevated liver enzymes    Pleural effusion  Resolved Problems:    * No resolved hospital problems.  *      Plan:  Acute respiratory failure with hypoxia  Continue oxygen to maintain pulse ox greater than 92%, currently on high flow nasal cannula 15 L, pulse ox 94%  Multifocal pneumonia-continue IV antibiotic azithromycin per infectious disease team,   Likely also component of aspiration  chest x-ray July 20, 2021-  Right lung infiltrates with improved aeration of the right upper lobe   compared to 07/24/2021      continue IV antibiotics as scheduled  ABG with hypercarbic respiratory failure-  BiPAP  Stat chest x-ray  Worsening mentation/lethargy today    Congestive heart failure-   Laboratory values showing proBNP elevated , today 19,000 resume diuretics, IV Bumex 1 mg IV twice daily resumed at home patient normally on Lasix 40 mg twice daily    2D echo-from July 25, 2020--Summary   Ejection fraction is visually estimated at 65%.   No regional wall motion abnormalities seen.   Dilated right ventricle with reduced function. TAPSE is 1.6 cm. Right   ventricle pacemaker lead noted.   Left atrial volume index of 66 ml per meters squared BSA.   Mild aortic stenosis is present.   Physiologic and/or trace aortic regurgitation is noted.   Mild to moderate mitral regurgitation is present.   Moderate to severe tricuspid regurgitation.   Pulmonary hypertension is moderate. RVSP is 52 mmHg    Acute kidney injury  improvement continues, BUN 50, creatinine 1.1, yesterday creatinine 1.2  Continue Bumex 1 mg IV twice daily-can likely transition over to p.o. as she has diuresed a fair amount now and appears euvolemic  Renal following      A. Fib  Rate controlled  V paced  No anticoagulation due to history of GI bleeds    Patient had failed bedside swallow discussion held with spouse for PEG placement CODE STATUS patient states he will discuss with family and let bedside nurse know. DVT Prophylaxis   PT/OT  Discharge planning     MARLEN Holbrook - CNP  4:13 PM  7/29/2021     Await family decision regarding PEG tube  Consideration now being given to hospice  Palliative care on board    I personally saw, examined and provided care for the patient. Radiographs, labs and medication list were reviewed by me independently.   The case was discussed in detail and plans for care were established. Review of [facility], documentation was conducted and revisions were made as appropriate directly by me. I agree with the above documented exam, problem list, and plan of care.      Ene Garcia MD  4:32 PM  7/29/2021

## 2021-07-29 NOTE — PROGRESS NOTES
Wound / ostomy dept follow-up for skin assessment. The DTI on her buttocks has improved. Color is fading. Heels remain intact. She has a poor appetite. Daughter is at the bedside and updated. Will cont the current POC.

## 2021-07-29 NOTE — CONSULTS
Palliative Care Department  [phone]  Palliative Care Initial Consult  Provider MARLEN Smith CNP      PATIENT: Chana Gottron  : 1924  MRN: [id number]  ADMISSION DATE: 2021 12:24 PM  Referring Provider: Dr. Enrike Okeefe was consulted on hospital day 5 for assistance with Goals of care, Code Status Discussion, Family support    HPI:     Caryl Woodard is a 80 y.o. y/o female with a history of congestive heart failure with preserved ejection fraction, CAD, AF not on anticoagulation due to prior GI bleed, pacemaker insertion, dementia who presented to [facility]) on 2021 with lethargy, weakness, and shortness of breath. A CT of the chest was obtained in ER which revealed right lung multifocal pneumonia. Labs upon arrival to ER were significant for BUN 78 mg/dL, creatinine 2.2 mg/dL, and lactic acid level 2.3. She failed her swallow evaluation and the recommended diet was nothing by mouth. ASSESSMENT/PLAN:     Pertinent Hospital Diagnoses      Multifocal pneumonia   Acute Kidney Injury- resolved   Congestive heart failure   Dementia    Palliative Care Encounter / Counseling Regarding Goals of Care  Please see detailed goals of care discussion as below   At this time, Chana Gottron, Does Not have capacity for medical decision-making. Capacity is time limited and situation/question specific   During encounter Magnus Villalpando was surrogate medical decision-maker   Outcome of goals of care meeting: Met with the patient's , daughter, son, and daughter-in-law. They're in agreement that they do not believe they would want to have a peg tube placed. They're interested in persuring hospice. They choiced [facility].     Code status Limited DNRCCA/ DNI   Advanced Directives: POA or living will in [facility]   Surrogate/Legal NOK:  o Magnus Villalpando () [phone]  o Owen Serranoshira (daughter) [phone]    Spiritual assessment: no spiritual distress identified  Bereavement and grief: to be determined  Referrals to: none today    Thank you for the opportunity to participate in the care of Amos Raymond. Mario Swartz, MARLEN - DUSTIN  Palliative Medicine     SUBJECTIVE:     Details of Conversation:  Juventino Soliman was resting comfortably in the bed, opened her eyes and responded to questions, but fell asleep easily. I met with the patient's , son, daughter-in law, and daughter at bedside and introduced palliative medicine. They explained that the patient has had a significant decline over the past few months. She has been living at [place] [address] and her  lives in the assisted living part. They have been  for 68 years. He expressed that \"when you get  they say you become 1 and I feel that half of me is gone. \" Her dementia has progressed over the past few years. Over the past few months she has not been interested in eating or drinking. We discussed the patient's living will wishes and what she had voiced to them previously. The family is agreement that they do not believe the patient would want to have a PEG tube placed for artificial nutrition. They're considering Hospice and would like more information. Hospice consult was placed. Comfort and support were provided.      Physical Function:  PPS: 40%    Prognosis: Poor    OBJECTIVE:     BP (!) 105/53   Pulse 64   Temp 98.7 °F (37.1 °C) (Axillary)   Resp 18   Ht 5' 4\" (1.626 m)   Wt 140 lb 1.6 oz (63.5 kg)   SpO2 94%   BMI 24.05 kg/m²     Physical Examination:  Gen: elderly, thin, NAD, somnolent, confused  HEENT: normocephalic, atraumatic  Neck: trachea midline, no JVD  Lungs: respirations easy and not labored,   Heart: regular rate and rhythm, distant heart tones,   Abdomen: normoactive bowel sounds, soft, non-tender  Extremities: weakness, edema of the lower extremities    Skin: warm, dry, bruising  Neuro: somnolent, confusion, awakens to voice    Objective data reviewed: labs, images, records, medication use, vitals and chart    Time/Communication  Greater than 50% of time spent, total 70 minutes in counseling and coordination of care at the bedside regarding goals of care. Thank you for allowing Palliative Medicine to participate in the care of [facility]. Note: This report was completed using computerConnected voiced recognition software. Every effort has been made to ensure accuracy; however, inadvertent computerized transcription errors may be present.

## 2021-07-29 NOTE — PROGRESS NOTES
Department of Internal Medicine  Nephrology Progress Note    Events reviewed. SUBJECTIVE:  We are following MsLore Beck for VALERIE. She is lethargic. Her son is at the bedside.     Physical Exam:    VITALS:  /66   Pulse 60   Temp 98.5 °F (36.9 °C) (Axillary)   Resp 20   Ht 5' 4\" (1.626 m)   Wt 140 lb 1.6 oz (63.5 kg)   SpO2 91%   BMI 24.05 kg/m²   24HR INTAKE/OUTPUT:    Intake/Output Summary (Last 24 hours) at 7/29/2021 1054  Last data filed at 7/28/2021 1753  Gross per 24 hour   Intake 120 ml   Output 625 ml   Net -505 ml       Constitutional:  Lethargic, NAD  Cardiovascular/Edema:  RRR, S1,S2  Respiratory:  Diminished lung sounds in the bases, on 14L high flow  Gastrointestinal:  Soft, rounded, nontender, nondistended  Other:  No edema    Scheduled Meds:   levofloxacin  750 mg Intravenous Q48H    albuterol  2.5 mg Nebulization Q6H    bumetanide  1 mg Intravenous BID    aspirin  81 mg Oral Daily    sodium chloride flush  5-40 mL Intravenous 2 times per day    heparin (porcine)  5,000 Units Subcutaneous 3 times per day    donepezil  5 mg Oral Daily    gabapentin  100 mg Oral Nightly    isosorbide mononitrate  30 mg Oral Daily    levothyroxine  50 mcg Oral Nightly    metoprolol tartrate  100 mg Oral BID    pantoprazole  40 mg Oral BID AC     Continuous Infusions:   sodium chloride       PRN Meds:.diphenhydrAMINE, acetaminophen, sodium chloride flush, sodium chloride, polyethylene glycol, trimethobenzamide, perflutren lipid microspheres    DATA:    CBC:   Lab Results   Component Value Date    WBC 6.9 07/29/2021    RBC 3.70 07/29/2021    HGB 11.6 07/29/2021    HCT 36.6 07/29/2021    MCV 98.9 07/29/2021    MCH 31.4 07/29/2021    MCHC 31.7 07/29/2021    RDW 14.6 07/29/2021     07/29/2021    MPV 12.1 07/29/2021     CMP:    Lab Results   Component Value Date     07/29/2021    K 3.7 07/29/2021    K 4.2 07/25/2021     07/29/2021    CO2 34 07/29/2021    BUN 53 07/29/2021    CREATININE 1.0 07/29/2021    GFRAA >60 07/29/2021    LABGLOM 51 07/29/2021    GLUCOSE 96 07/29/2021    GLUCOSE 99 05/31/2012    PROT 5.8 07/29/2021    LABALBU 2.6 07/29/2021    LABALBU 2.6 05/16/2012    CALCIUM 9.0 07/29/2021    BILITOT 0.7 07/29/2021    ALKPHOS 116 07/29/2021    AST 22 07/29/2021    ALT 34 07/29/2021     Magnesium:    Lab Results   Component Value Date    MG 2.0 01/02/2018     Phosphorus:    Lab Results   Component Value Date    PHOS 2.2 07/28/2021     Radiology Review:      Kidney ultrasound complete 7/25/2021   Atrophic kidneys. No hydronephrosis. A Bang catheter is completely   decompressing the bladder. CT chest without IV contrast 7/24/2021   Scattered bilateral consolidations most prominent in the right lung is   concerning for multifocal pneumonia. Follow-up CT of the chest may be   obtained in 6-8 weeks after completion of therapy. CT abdomen and pelvis without IV contrast 7/24/2021   Patchy infiltrates in the lung bases more on the right side concerning for   pneumonia with small pleural effusion. Diffuse diverticulosis of the left hemicolon with thickening of the sigmoid   colon likely spasm or uncomplicated diverticulitis. There is no obstructive   uropathy. CXR 7/28/2021   Stable appearance of the chest compared to 07/27/2021. BRIEF SUMMARY OF INITIAL CONSULT:    Briefly, Ms. Hans Wynn is a 80year old female with a PMH of HTN, HFpEF 65%, CAD, permanent AF not on anticoagulation due to prior GI bleed, pacemaker insertion, dementia, who was admitted on July 24, 2021 after presenting to the ER with lethargy, weakness, and shortness of breath. She was found to be hypoxic in ER. A CT of the chest was obtained in ER which revealed right lung multifocal pneumonia. Labs upon arrival to ER were significant for BUN 78 mg/dL, creatinine 2.2 mg/dL, and lactic acid level 2.3, which are reasons for this consultation. Her home medications include losartan and Lasix.  Her daughter states she has not been eating much over the past few weeks. She was also recently treated for UTI. IMPRESSION/RECOMMENDATIONS:      VALERIE stage II on CKD, volume responsive pre-renal VALERIE secondary to intravascular volume depletion in the setting of ARB and diuretic administration. Renal function improved with IV fluid administration. Resolved. Kidney ultrasound revealed bilateral atrophic kidneys. CKD stage II-III, baseline creatinine 0.9-1.0 mg/dL, with bilateral atrophic kidneys. Hypernatremia, secondary to lack of oral free water intake. To start on D5W.    HTN, on metoprolol  HFpEF 65% pro-BNP 19,853, on metoprolol Bumex  ---------------------------------------------------------------  Permanent AF, not on anticoagulation due to prior GI bleed, on metoprolol  Pneumonia, on levofloxacin   Acute respiratory failure, secondary to #5 and #7    Plan:    Start D5W at 50 cc/hr  Continue Bumex 1 mg IV BID  Continue to monitor renal function  Monitor sodium levels    Electronically signed by MARLEN Ochoa CNP on 7/29/2021 at 1:25 PM

## 2021-07-29 NOTE — PROGRESS NOTES
[facility]     Liaison Information Visit Note              Patient Name: Chloe Dewitt   :  1924  MRN:  [id number]  Admit date:  2021   Hospital Admitting Physician:  Leelee Workman MD   PCP:  Marino José DO  Primary Insurance: Payor: Delwyn Osler /  /  /    Emergency Contact:   Contact/Relation:  Jag Ge /   Spouse       Phone:   [phone]  Advance Directive  Patient has completed an advance directive  and Patient has a documented healthcare surrogate  Discussed with: Family member  DPOA-HC Name-Relation:    Phone:     Terminal Diagnosis Alzheimers Dementia as confirmed by Dr. Amanda Hansen Problem List:   Patient Active Problem List   Diagnosis Code    At risk for falling Z91.81    Essential hypertension I10    ASHD (arteriosclerotic heart disease) I25.10    Rheumatoid arthritis ([address] M06.9    History of TIA (transient ischemic attack) Z86.73    Paroxysmal atrial fibrillation (HCC) I48.0    S/P cardiac pacemaker procedure Z95.0    Chronic diastolic CHF (congestive heart failure) (HCC) I50.32    Sepsis ([address] A41.9    Elevated troponin R77.8    Tinea unguium B35.1    Ingrown nail L60.0    Peripheral vascular disease, unspecified ([facility]) I73.9    Pain in toe of right foot M79.674    Pain in left toe(s) M79.675    Difficulty walking R26.2    Corn or callus L84    Pressure injury of left foot, unstageable ([address] L89.890    Acute gastric ulcer with hemorrhage K25.0    Osteoarthritis M19.90    Hypothyroidism E03.9    Hypertension I10    CAD (coronary artery disease) I25.10    Acute blood loss anemia D62    Acute respiratory failure with hypoxia ([address] J96.01    VALERIE (acute kidney injury) ([address] N17.9    Chronic CHF (congestive heart failure) (HCC) I50.9    Elevated brain natriuretic peptide (BNP) level R79.89    Pneumonia of both lower lobes due to infectious organism J18.9    Chronic anemia D64.9    Elevated liver enzymes R74.8    Pleural effusion J90 Code Status Order: Limited     Allergies:  Penicillins and Sulfa antibiotics    Family Goal: Comfort Care    Meeting held with Houston Boland (spouse) Yoan Espana (son) Laron Rowe (daughter)    The hospice benefit and philosophy were explained including that hospice is end of life care in which, per Medicare, a patient has a terminal diagnosis that life expectancy would be 6 months or less. Hospice care is a service that is covered by most insurance plans. The following levels of hospice care were discussed including, routine level of hospice care at private home or facility, which patient/family is responsible for any room and board fees at the facility, and general in patient level of care (GIP) at the [facility] for short term symptom management. Per Medicare guidelines, a patient is considered appropriate for GIP if they have uncontrolled symptoms such as pain, agitation, labored breathing or nausea/vomiting. Once symptoms become managed, the patient would need to be moved to a lower level of care such as home with hospice, ECF with hospice or the Transition program.  [facility] transition program also explained which is routine hospice care provided at the [facility] instead of an ECF or home. The transition program is private pay $300/day for room/board. Room/board for the transition program is not covered by Medicaid as would be in an ECF. Family informed that with the routine level of care at home or ECF,  the hospice team consists of the RN who visits 1-3 times a week, a  who visits within the first five days of the hospice election, the personal care team who visit 1-3 times a week, non-medical volunteers and Chaplains. Explained that at home in routine level of care, familles are responsible for the 24 hour care. Discussed that under hospice care patient would not receive chemotherapy, radiation, immune therapy, IV antibiotics, dialysis or blood transfusions.   Explained that once in hospice care, all aggressive treatments would be stopped and allow nature to takes its course with focus on comfort care for the patient. Met with family at bedside. Master Harmon slept and did not participate in conversation. Discussed tube feeds and oxygen at length. Patient's oxygen will need decreased prior to discharge. Comfort medications would be ordered in case they are needed. All questions were answered. Family is going to discuss hospice further and wants to reach out to [facility] regarding some questions. Per Mona Hart, Case management. Patient can return to [facility] with Hospice. She is checking on the amount of oxygen patient can have there. Updated charge nurse and bedside nurse. Discharge Plan:  Discharge Disposition; pending    [facility] plan:  1. Follow-up with family tomorrow 7/30  2. Please call Gregorio Kwong [phone] with any questions. 3. Patient not currently under the care of hospice.     Electronically signed by Kulwant Parker RN on 7/29/2021 at 4:27 PM

## 2021-07-29 NOTE — CARE COORDINATION
CASE MANAGEMENT. .. Notified by nursing that patient failed her swallow eval and there are no plans for video swallow to be ordered. Soumya Fontenot NP spoke with  regarding corepak vs peg. Mr Bo Medley wants to discuss with his children. Met with Mr Bo Medley at the bedside. I introduced Palliative Care to him and he is interested in meeting with them. Order obtained. Will follow.

## 2021-07-29 NOTE — PROGRESS NOTES
[address] [place] [address] [facility]  Progress Note  Face to face encounter   SUBJECTIVE:  Chief Complaint   Patient presents with    Fatigue     lethargic     Patient is tolerating medications. No nausea, vomiting, diarrhea, fevers.  at bedside-   Patient still confused. On 13L high flow cannula    Review of systems:  As stated above in the chief complaint, otherwise negative. Medications:  Scheduled Meds:   levofloxacin  750 mg Intravenous Q48H    albuterol  2.5 mg Nebulization Q6H    bumetanide  1 mg Intravenous BID    aspirin  81 mg Oral Daily    sodium chloride flush  5-40 mL Intravenous 2 times per day    heparin (porcine)  5,000 Units Subcutaneous 3 times per day    donepezil  5 mg Oral Daily    gabapentin  100 mg Oral Nightly    isosorbide mononitrate  30 mg Oral Daily    levothyroxine  50 mcg Oral Nightly    metoprolol tartrate  100 mg Oral BID    pantoprazole  40 mg Oral BID AC     Continuous Infusions:   dextrose      sodium chloride       PRN Meds:diphenhydrAMINE, acetaminophen, sodium chloride flush, sodium chloride, polyethylene glycol, trimethobenzamide, perflutren lipid microspheres    OBJECTIVE:  /66   Pulse 60   Temp 98.5 °F (36.9 °C) (Axillary)   Resp 20   Ht 5' 4\" (1.626 m)   Wt 140 lb 1.6 oz (63.5 kg)   SpO2 96%   BMI 24.05 kg/m²   Temp  Av.3 °F (36.8 °C)  Min: 98.1 °F (36.7 °C)  Max: 98.5 °F (36.9 °C)  Constitutional: The patient is sitting up in bed. Weak, fatigued, 13L NC, +hallunciations   Skin: Warm and dry. No rashes were noted. Poor turgor   HEENT: Round and reactive pupils. Moist mucous membranes. No ulcerations or thrush. Neck: Supple to movements. Chest: No use of accessory muscles to breathe. Symmetrical expansion. No wheezing, crackles or rhonchi. Poor air exchange throughout   Cardiovascular: S1 and S2 are rhythmic and regular. No murmurs appreciated. Abdomen: Positive bowel sounds to auscultation. Benign to palpation. Extremities: No edema. Lines: peripheral    Laboratory and Tests Review:  Lab Results   Component Value Date    WBC 6.9 07/29/2021    WBC 6.6 07/28/2021    WBC 5.7 07/27/2021    HGB 11.6 07/29/2021    HCT 36.6 07/29/2021    MCV 98.9 07/29/2021     07/29/2021     Lab Results   Component Value Date    NEUTROABS 5.73 07/29/2021    NEUTROABS 5.87 07/28/2021    NEUTROABS 4.64 07/27/2021     No results found for: [facility]  Lab Results   Component Value Date    ALT 34 (H) 07/29/2021    AST 22 07/29/2021    ALKPHOS 116 (H) 07/29/2021    BILITOT 0.7 07/29/2021     Lab Results   Component Value Date     07/29/2021    K 3.7 07/29/2021    K 4.2 07/25/2021     07/29/2021    CO2 34 07/29/2021    BUN 53 07/29/2021    CREATININE 1.0 07/29/2021    CREATININE 1.1 07/28/2021    CREATININE 1.2 07/27/2021    GFRAA >60 07/29/2021    LABGLOM 51 07/29/2021    GLUCOSE 96 07/29/2021    GLUCOSE 99 05/31/2012    PROT 5.8 07/29/2021    LABALBU 2.6 07/29/2021    LABALBU 2.6 05/16/2012    CALCIUM 9.0 07/29/2021    BILITOT 0.7 07/29/2021    ALKPHOS 116 07/29/2021    AST 22 07/29/2021    ALT 34 07/29/2021     Radiology:  CXR reviewed  Right infiltrates are improving     Microbiology:   7/25/2021 Streptococcus pneumoniae/Legionella urine Ag: POSITIVE Legionella pneumophila x 2  Blood cultures 7/24/2021: negative so far  Urine culture 7/24/2021: Corynebacterium species, pansensitive E.coli   MRSA screen: MRSA not isolated     ASSESSMENT:  Legionella pneumonia in a patient from a nursing home -  Repeat and original urine were both +  Probable aspiration pneumonia  VALERIE - improved     PLAN:  Continue Levaquin- was changed from Azithromycin   Patient still confused- probably from multiple factors- not just antibiotics    Monitor labs  Patient failed swallow study - she is NPO  lfts trending down   Palliative care to see    MARLEN Conley - CNS   1:28 PM       Pt seen and examined.  Above discussed agree with advanced practice nurse. Labs, cultures, and radiographs reviewed. Face to Face encounter occurred. Changes made as necessary.      Lv Mock MD

## 2021-07-29 NOTE — PROGRESS NOTES
Date: 7/29/2021    Time: 0112    Patient Placed On BIPAP/CPAP/ Non-Invasive Ventilation? No    If no must comment. Facial area red/color change? If YES are Blister/Lesion present? If yes must notify nursing staff  BIPAP/CPAP skin barrier?   Yes    Skin barrier type:mepilexlite       Comments:    Red outlet already on  Unable to see under mepilex  Pt pulling at mask     Nacho Ventura RCP

## 2021-07-29 NOTE — PROGRESS NOTES
Spoke with Chloe Crawford NP regarding patient possibly not being able tolerate video swallow due to increased oxygen demands. Obtained order for bedside speech evaluation.

## 2021-07-29 NOTE — CARE COORDINATION
Social Work/Discharge Planning:  Marcos Sings with [facility] states patient can return to facility under hospice services. She will confirm maximum amount of liter flow facility can accommodate. Will continue to follow.   Electronically signed by SHERYL Malin on 7/29/2021 at 4:06 PM

## 2021-07-29 NOTE — CARE COORDINATION
CASE MANAGEMENT. Susie Ma Chart reviewed. COVID NEG 7/24. Patient requiring bipap or hiflow o2 14lnc. Being followed by id/renal and cardio prn. Continues on iv levaquin q48hrs and iv bumex bid. PT 9 / OT 11. Met with the patient and her  at the bedside. We discussed discharge plans and Mrs Chet Díaz intends on returning to [facility] when ready. She and her  are interested in [facility] if she qualifies. Choices provided and referral called to Erica Jones with [facility]. He will review patient information. Will cont to follow along and assist with needs accordingly. (N17 in epic for [facility] and forms in chart). IN ADDENDUM. ...1:20pm, Per Celestine from [employer], patient has LTAC criteria. Facility can accept patient today. Bed availability is getting limited. Charge nurse updated.

## 2021-07-30 NOTE — PROGRESS NOTES
Natali Muñoz is a 80year old female with Alzheimers Dementia. She also has CHF with preserved EF and moderate pulmonary HTN. She came in with increased SOB and was found to have pneumonia, probably aspiration, and acute CHF. During the stay [facility] respiratory status has continued declined and she failed her swallow evaluation. Family has chosen not to proceed with further interventions and would like to focus on comfort care and hospice. Iris Banerjee was on 15L nasal cannula and was tolerating oxygen without distress. She was placed on Bipap for nighttime and desaturated and had dyspnea when respiratory tried to transition back to nasal cannula and bipap was placed back on. Patient was given comfort medications and bipap was removed. (Morphine 2mg). She continues to have deep labored breathing with abdominal muscle use. Oxygen saturation 85% on 10L. She was given Ativan 1mg. Despite medications she remains dyspneic. She was given another dose of morphine 2mg without relief. Spoke with Cammy Figueredo CNP who accepted patient inpatient level of care at [facility]. Acute hypoxic respiratory failure due to pneumonia. Received room 114 at [facility] with a transport time of ASAP  Set up transportation with [facility] within the hour, by 1400. Updated charge nurse and bedside nurse. Requested discharge order. Bang and IVs to remain in place. Gave report to [facility] nurse. Signed consents with family.

## 2021-07-30 NOTE — PROGRESS NOTES
Date: 7/30/2021    Time: 8:23 AM    Patient Placed On BIPAP/CPAP/ Non-Invasive Ventilation? Yes    If no must comment. Facial area red/color change? No           If YES are Blister/Lesion present? No   If yes must notify nursing staff  BIPAP/CPAP skin barrier?   Yes    Skin barrier type:mepilexlite       Comments:        Dany Haas

## 2021-07-30 NOTE — PROGRESS NOTES
[address] [place] [address] [facility]  Progress Note  Face to face encounter   SUBJECTIVE:  Chief Complaint   Patient presents with    Fatigue     lethargic     Patient is tolerating medications. No nausea, vomiting, diarrhea, fevers. On bipap  Lethargic  Family at bedside. Review of systems:  As stated above in the chief complaint, otherwise negative. Medications:  Scheduled Meds:   levofloxacin  750 mg Intravenous Q48H    albuterol  2.5 mg Nebulization Q6H    bumetanide  1 mg Intravenous BID    aspirin  81 mg Oral Daily    sodium chloride flush  5-40 mL Intravenous 2 times per day    heparin (porcine)  5,000 Units Subcutaneous 3 times per day    donepezil  5 mg Oral Daily    gabapentin  100 mg Oral Nightly    isosorbide mononitrate  30 mg Oral Daily    levothyroxine  50 mcg Oral Nightly    metoprolol tartrate  100 mg Oral BID    pantoprazole  40 mg Oral BID AC     Continuous Infusions:   dextrose 50 mL/hr at 21 0647    sodium chloride       PRN Meds:morphine, LORazepam, diphenhydrAMINE, acetaminophen, sodium chloride flush, sodium chloride, polyethylene glycol, trimethobenzamide, perflutren lipid microspheres    OBJECTIVE:  BP (!) 112/55   Pulse 61   Temp 97.7 °F (36.5 °C) (Axillary)   Resp 20   Ht 5' 4\" (1.626 m)   Wt 141 lb 8 oz (64.2 kg)   SpO2 94%   BMI 24.29 kg/m²   Temp  Av °F (36.7 °C)  Min: 97.6 °F (36.4 °C)  Max: 98.7 °F (37.1 °C)  Constitutional: The patient is lethargic. Bipap. Skin: Warm and dry. No rashes were noted. Poor turgor   HEENT: Round and reactive pupils. Moist mucous membranes. No ulcerations or thrush. Neck: Supple to movements. Chest: No use of accessory muscles to breathe. Symmetrical expansion. On bipap. Poor air exchange throughout   Cardiovascular: S1 and S2 are rhythmic and regular. Abdomen: Positive bowel sounds to auscultation. Benign to palpation. Extremities: No edema.   Lines: peripheral    Laboratory and Tests Review:  Lab Results   Component Value Date    WBC 6.9 07/29/2021    WBC 6.6 07/28/2021    WBC 5.7 07/27/2021    HGB 11.6 07/29/2021    HCT 36.6 07/29/2021    MCV 98.9 07/29/2021     07/29/2021     Lab Results   Component Value Date    NEUTROABS 5.73 07/29/2021    NEUTROABS 5.87 07/28/2021    NEUTROABS 4.64 07/27/2021     No results found for: [facility]  Lab Results   Component Value Date    ALT 34 (H) 07/29/2021    AST 22 07/29/2021    ALKPHOS 116 (H) 07/29/2021    BILITOT 0.7 07/29/2021     Lab Results   Component Value Date     07/29/2021    K 3.7 07/29/2021    K 4.2 07/25/2021     07/29/2021    CO2 34 07/29/2021    BUN 53 07/29/2021    CREATININE 1.0 07/29/2021    CREATININE 1.1 07/28/2021    CREATININE 1.2 07/27/2021    GFRAA >60 07/29/2021    LABGLOM 51 07/29/2021    GLUCOSE 96 07/29/2021    GLUCOSE 99 05/31/2012    PROT 5.8 07/29/2021    LABALBU 2.6 07/29/2021    LABALBU 2.6 05/16/2012    CALCIUM 9.0 07/29/2021    BILITOT 0.7 07/29/2021    ALKPHOS 116 07/29/2021    AST 22 07/29/2021    ALT 34 07/29/2021     Radiology:  CXR reviewed  Right infiltrates are improving     Microbiology:   7/25/2021 Streptococcus pneumoniae/Legionella urine Ag: POSITIVE Legionella pneumophila x 2  Blood cultures 7/24/2021: negative so far  Urine culture 7/24/2021: Corynebacterium species, pansensitive E.coli   MRSA screen: MRSA not isolated     ASSESSMENT:  · Legionella pneumonia in a patient from a nursing home -  Repeat and original urine were both +  · Probable aspiration pneumonia  · VALERIE - improved     PLAN:  · Stop levaquin on discharge   · Patient failed swallow study - she is NPO  · Patient is transitioning to Hospice care  · ID will sign off     MARLEN Carreno   10:44 AM     Pt seen and examined. Above discussed agree with advanced practice nurse. Labs, cultures, and radiographs reviewed. Face to Face encounter occurred. Changes made as necessary.      Arely Aparicio MD

## 2021-07-30 NOTE — PROGRESS NOTES
Received a call from patient's son Cheryl Fowler. They would like to proceed with hospice care, preferably at [facility]. [facility] answered all their questions and are able to accept under hospice. Spoke with Marylene Spice in admissions at [facility]. They can accept up to 10L of oxygen. They would need an oxygen concentrator and low air loss mattress to be delivered. DME faxed order for delivery [phone] this afternoon. Dr. Mara Campoverde is medical director. Requested hospice order and prognosis be faxed to intake department. Follow-up visit made to patient's bedside. She did not tolerate bipap removal this morning, dyspneic and desaturated. Discussed with son, Cheryl Fowler comfort medications and removing the bipap and patient down to 10L. If unable to get patient comfortable with medications will discuss and evaluate for hospice house. Cheryl Fowler is understanding. Spoke with bedside nurse [facility]. Discussed plan to remove bipap and not concerned with oxygen saturation, but patient's breathing effort. Comfort medications ordered and patient changed to a DNR-CC. Melissa to keep this nurse updated and let her know if comfort medications are ineffective. Will follow-up once bipap is removed. Intake department aware patient will be discharged today.    Electronically signed by Marilu Norman RN on 7/30/2021 at 9:32 AM

## 2021-07-30 NOTE — PROGRESS NOTES
determined  Referrals to: none today    Thank you for the opportunity to participate in the care of Nga Osorio. Cedrick Tillman, APRN - CNP  Palliative Medicine     SUBJECTIVE:     Details of Conversation:  Met with the patient and her daughter at bedside. Mar Lynne was resting comfortably on the BiPAP. She has had worsening respiratory issues over night and the family has elected to pursue comfort care and hospice. Her daughter Rosales Ratliff stated that her dad and brother were on their way up and then they will remove the BiPAP mask. Comfort and support were provided. Comfort medications were ordered and [facility] is following. Prognosis: Poor    OBJECTIVE:     BP (!) 112/55   Pulse 61   Temp 97.7 °F (36.5 °C) (Axillary)   Resp 19   Ht 5' 4\" (1.626 m)   Wt 141 lb 8 oz (64.2 kg)   SpO2 94%   BMI 24.29 kg/m²     Physical Examination:  Gen: elderly, thin, NAD, somnolent, confused  HEENT: normocephalic, atraumatic  Neck: trachea midline, no JVD  Lungs: respirations easy and not labored,   Heart: regular rate and rhythm, distant heart tones,   Abdomen: normoactive bowel sounds, soft, non-tender  Extremities: weakness, edema of the lower extremities    Skin: warm, dry, bruising  Neuro: somnolent, confusion    Objective data reviewed: labs, images, records, medication use, vitals and chart    Time/Communication  Greater than 50% of time spent, total 25 minutes in counseling and coordination of care at the bedside regarding goals of care. Thank you for allowing Palliative Medicine to participate in the care of Nga Osorio. Note: This report was completed using [employer] voiced recognition software. Every effort has been made to ensure accuracy; however, inadvertent computerized transcription errors may be present.

## 2021-07-30 NOTE — PROGRESS NOTES
Attempted to take patient off of BiPap. Within minutes of taking off, patient demonstrated increase muscle usage and her SpO2 dropped to 86% on a 15L HFNC. Patient continued to have increase work of breathing. Patient placed back on the BiPAP. Patient's breathing eased, and her spO2 increased to 92%. Explained all to the RN. Will continue to monitor the patient.

## 2021-08-09 NOTE — DISCHARGE SUMMARY
Physician Discharge Summary     Patient ID:  Nga Osorio  [id number]  65 y.o.  7/4/1924    Admit date: 7/24/2021    Discharge date and time: 7/30/2021    Admission Diagnoses:   Patient Active Problem List   Diagnosis    At risk for falling    Essential hypertension    ASHD (arteriosclerotic heart disease)    Rheumatoid arthritis ([facility]    History of TIA (transient ischemic attack)    Paroxysmal atrial fibrillation (HCC)    S/P cardiac pacemaker procedure    Chronic diastolic CHF (congestive heart failure) (HCC)    Sepsis (HCC)    Elevated troponin    Tinea unguium    Ingrown nail    Peripheral vascular disease, unspecified (HCC)    Pain in toe of right foot    Pain in left toe(s)    Difficulty walking    Corn or callus    Pressure injury of left foot, unstageable ([facility]    Acute gastric ulcer with hemorrhage    Osteoarthritis    Hypothyroidism    Hypertension    CAD (coronary artery disease)    Acute blood loss anemia    Acute respiratory failure with hypoxia (HCC)    VALERIE (acute kidney injury) (HCC)    Chronic CHF (congestive heart failure) (HCC)    Elevated brain natriuretic peptide (BNP) level    Pneumonia of both lower lobes due to infectious organism    Chronic anemia    Elevated liver enzymes    Pleural effusion       Discharge Diagnoses: Acute respiratory failure with hypoxia    Consults: Hospice    Procedures: None    Hospital Course: The patient is a 80 y.o. female of Jeanette Sever, DO who presents with respiratory failure/congestive heart failure.       Acute respiratory failure with hypoxia  Continue oxygen to maintain pulse ox greater than 92%, currently on high flow nasal cannula 15 L, pulse ox 94%  Multifocal pneumonia-continue IV antibiotic azithromycin per infectious disease team,   Likely also component of aspiration  chest x-ray July 20, 2021-  Right lung infiltrates with improved aeration of the right upper lobe   compared to 07/24/2021      Infectious disease on consult plan will be to continue IV antibiotics as scheduled  ABG with hypercarbic respiratory failure-  BiPAP  Stat chest x-ray  Worsening mentation/lethargy today     Congestive heart failure-   Laboratory values showing proBNP elevated , today 19,000 resume diuretics, IV Bumex 1 mg IV twice daily resumed at home patient normally on Lasix 40 mg twice daily     2D echo-from July 25, 2020--Summary   Ejection fraction is visually estimated at 65%.   No regional wall motion abnormalities seen.   Dilated right ventricle with reduced function. TAPSE is 1.6 cm. Right   ventricle pacemaker lead noted.   Left atrial volume index of 66 ml per meters squared BSA.   Mild aortic stenosis is present.   Physiologic and/or trace aortic regurgitation is noted.   Mild to moderate mitral regurgitation is present.   Moderate to severe tricuspid regurgitation.   Pulmonary hypertension is moderate. RVSP is 52 mmHg     Acute kidney injury  improvement continues, BUN 50, creatinine 1.1, yesterday creatinine 1.2  Continue Bumex 1 mg IV twice daily  Renal following        A. Fib  Rate controlled  V paced  No anticoagulation due to history of GI bleeds    Patient had a failed swallow and family did not want to proceed with PEG tube placement nor NG and requested hospice. Patient was discharged in transfer to the hospice house. Above note edited to reflect my thoughts     I personally saw, examined and provided care for the patient. Radiographs, labs and medication list were reviewed by me independently. The case was discussed in detail and plans for care were established. Review of [address] [place] [address], documentation was conducted and revisions were made as appropriate directly by me. I agree with the above documented exam, problem list, and plan of care. Margy Larson MD  7/30/2021      No results for input(s): WBC, HGB, HCT, PLT in the last 72 hours.     No results for input(s): NA, K, CL, CO2, BUN, CREATININE, CALCIUM in the last 72 hours.    Invalid input(s): GLU    No results found. Discharge Exam:    HEENT: NCAT,  PERRLA, No JVD  Heart:  RRR, no murmurs, gallops, or rubs. Lungs:  CTA bilaterally, no wheeze, rales or rhonchi  Abd: bowel sounds present, nontender, nondistended, no masses  Extrem:  No clubbing, cyanosis, or edema    Disposition: Hospice house     Patient Condition at Discharge: Poor    Patient Instructions:      Medication List      ASK your doctor about these medications    docusate sodium 100 MG capsule  Commonly known as: COLACE     donepezil 5 MG tablet  Commonly known as: ARICEPT     furosemide 20 MG tablet  Commonly known as: LASIX  Take 2 tablets by mouth 2 times daily     isosorbide mononitrate 30 MG extended release tablet  Commonly known as: IMDUR  Take 1 tablet by mouth daily     levothyroxine 150 MCG tablet  Commonly known as: SYNTHROID     losartan 100 MG tablet  Commonly known as: COZAAR     memantine 10 MG tablet  Commonly known as: NAMENDA     metoprolol tartrate 25 MG tablet  Commonly known as: LOPRESSOR  Take 2 tablets by mouth 2 times daily     mirtazapine 15 MG tablet  Commonly known as: REMERON     pantoprazole 40 MG tablet  Commonly known as: PROTONIX  Take 1 tablet by mouth 2 times daily (before meals)     potassium chloride 10 MEQ extended release tablet  Commonly known as: KLOR-CON M  Take 1 tablet by mouth daily (with breakfast). traMADol 50 MG tablet  Commonly known as: ULTRAM          Activity: bedrest  Diet: none    Pt has been advised to: Follow-up with Doug Siddiqui DO in 1 week.   Follow-up with consultants as recommended by them    Note that over 30 minutes was spent in preparing discharge papers, discussing discharge with patient, medication review, etc.    Signed:  MARLEN Gunn CNP  8/9/2021  6:54 PM

## (undated) DEVICE — SPONGE GZ W4XL4IN RAYON POLY FILL CVR W/ NONWOVEN FAB

## (undated) DEVICE — BLOCK BITE 60FR RUBBER ADLT DENTAL

## (undated) DEVICE — FORCEPS BX OVL CUP FEN DISPOSABLE CAP L 160CM RAD JAW 4

## (undated) DEVICE — GRADUATE TRIANG MEASURE 1000ML BLK PRNT